# Patient Record
Sex: FEMALE | Race: WHITE | NOT HISPANIC OR LATINO | Employment: UNEMPLOYED | URBAN - METROPOLITAN AREA
[De-identification: names, ages, dates, MRNs, and addresses within clinical notes are randomized per-mention and may not be internally consistent; named-entity substitution may affect disease eponyms.]

---

## 2017-06-28 ENCOUNTER — OFFICE VISIT (OUTPATIENT)
Dept: FAMILY MEDICINE | Facility: CLINIC | Age: 37
End: 2017-06-28
Payer: COMMERCIAL

## 2017-06-28 VITALS
DIASTOLIC BLOOD PRESSURE: 68 MMHG | HEART RATE: 96 BPM | TEMPERATURE: 99.3 F | BODY MASS INDEX: 20.8 KG/M2 | SYSTOLIC BLOOD PRESSURE: 108 MMHG | WEIGHT: 125 LBS

## 2017-06-28 DIAGNOSIS — N61.0 MASTITIS: Primary | ICD-10-CM

## 2017-06-28 PROCEDURE — 99213 OFFICE O/P EST LOW 20 MIN: CPT | Performed by: NURSE PRACTITIONER

## 2017-06-28 RX ORDER — CLINDAMYCIN HCL 300 MG
300 CAPSULE ORAL 3 TIMES DAILY
Qty: 21 CAPSULE | Refills: 0 | Status: SHIPPED | OUTPATIENT
Start: 2017-06-28 | End: 2017-07-05

## 2017-06-28 NOTE — MR AVS SNAPSHOT
After Visit Summary   6/28/2017    Germania Hayden    MRN: [id number]           Patient Information     Date Of Birth          1980        Visit Information        Provider Department      6/28/2017 10:20 AM Annabel Gabriel APRN CNP [facility]        Care Instructions          Thank you for choosing [facility].  You may be receiving a survey in the mail from Terrence Delgado regarding your visit today.  Please take a few minutes to complete and return the survey to let us know how we are doing.      If you have questions or concerns, please contact us via Smeet or you can contact your care team at [phone].    Our Clinic hours are:  Monday 6:40 am  to 7:00 pm  Tuesday -Friday 6:40 am to 5:00 pm    The [place] outpatient lab hours are:  Monday - Friday 6:10 am to 4:45 pm  Saturdays 7:00 am to 11:00 am  Appointments are required, call [phone]    If you have clinical questions after hours or would like to schedule an appointment,  call the clinic at [phone].          Follow-ups after your visit        Who to contact     If you have questions or need follow up information about today's clinic visit or your schedule please contact [facility] directly at [phone].  Normal or non-critical lab and imaging results will be communicated to you by [facility] [employer], letter or phone within 4 business days after the clinic has received the results. If you do not hear from us within 7 days, please contact the clinic through [employer] or phone. If you have a critical or abnormal lab result, we will notify you by phone as soon as possible.  Submit refill requests through Smeet or call your pharmacy and they will forward the refill request to us. Please allow 3 business days for your refill to be completed.          Additional Information About Your Visit        [facility] [employer]shar[facility]vine Information     Smeet gives you secure access to your electronic health record. If you see a primary  care provider, you can also send messages to your care team and make appointments. If you have questions, please call your primary care clinic.  If you do not have a primary care provider, please call [phone] and they will assist you.        Care EveryWhere ID     This is your Care EveryWhere ID. This could be used by other organizations to access your [facility] medical records  [id number]        Your Vitals Were     Pulse Temperature BMI (Body Mass Index)             96 99.3  F (37.4  C) (Tympanic) 20.8 kg/m2          Blood Pressure from Last 3 Encounters:   06/28/17 108/68   12/20/16 99/67   11/11/16 100/63    Weight from Last 3 Encounters:   06/28/17 125 lb (56.7 kg)   12/20/16 133 lb 6.4 oz (60.5 kg)   11/10/16 149 lb (67.6 kg)              Today, you had the following     No orders found for display       Primary Care Provider Office Phone # Fax #    Chioma Nesbitt [phone]       [facility] [address]  [facility] [address]        Equal Access to Services     FINN YEH : Hadii aad ku hadasho Soomaali, waaxda luqadaha, qaybta kaalmada adeegyada, rosi reynolds . So [facility] [phone].    ATENCIÓN: Si habla español, tiene a feliciano disposición servicios gratuitos de asistencia lingüística. Llame al [phone].    We comply with applicable federal civil rights laws and [place] laws. We do not discriminate on the basis of race, color, national origin, age, disability sex, sexual orientation or gender identity.            Thank you!     Thank you for choosing [facility]  for your care. Our goal is always to provide you with excellent care. Hearing back from our patients is one way we can continue to improve our services. Please take a few minutes to complete the written survey that you may receive in the mail after your visit with us. Thank you!             Your Updated Medication List - Protect others around you: Learn how to  safely use, store and throw away your medicines at www.disposemymeds.org.          This list is accurate as of: 6/28/17 10:34 AM.  Always use your most recent med list.                   Brand Name Dispense Instructions for use Diagnosis    acyclovir 200 MG capsule    ZOVIRAX    240 capsule    Take 1 capsule (200 mg) by mouth 2 times daily    History of cold sores       COLACE PO      Take 50 mg by mouth 2 times daily as needed        norethindrone 0.35 MG per tablet    MICRONOR    84 tablet    Take 1 tablet (0.35 mg) by mouth daily    Routine postpartum follow-up       PEPCID PO      Take 20 mg by mouth daily        prenatal multivitamin  plus iron 27-0.8 MG Tabs per tablet      Take 1 tablet by mouth daily        sennosides 8.6 MG tablet    SENOKOT     Take 1 tablet by mouth daily

## 2017-06-28 NOTE — PATIENT INSTRUCTIONS
Thank you for choosing [facility]  You may be receiving a survey in the mail from Terrence Delgado regarding your visit today.  Please take a few minutes to complete and return the survey to let us know how we are doing.      If you have questions or concerns, please contact us via Brightstar or you can contact your care team at [phone].    Our Clinic hours are:  Monday 6:40 am  to 7:00 pm  Tuesday -Friday 6:40 am to 5:00 pm    The [place] outpatient lab hours are:  Monday - Friday 6:10 am to 4:45 pm  Saturdays 7:00 am to 11:00 am  Appointments are required, call [phone]    If you have clinical questions after hours or would like to schedule an appointment,  call the clinic at [phone].

## 2017-06-28 NOTE — PROGRESS NOTES
SUBJECTIVE:                                                    Germania Hayden is a 37 year old female who presents to clinic today for the following health issues:      C/O right breast pain for one day. Currently nursing/lactating. ? Mastitis. Breast is painful, warm to touch and slightly erythemic. Symptoms started yesterday and not improving. She has a previous history of plugged ducts however after manual manipulation symptoms have not improved. No fevers, she feel a little fatigued.     -------------------------------------    Problem list and histories reviewed & adjusted, as indicated.  Additional history: as documented    Patient Active Problem List   Diagnosis     Vulvar varicose veins     Past Surgical History:   Procedure Laterality Date     ARTHROSCOPIC RECONSTRUCTION ANTERIOR CRUCIATE LIGAMENT  x3    twice on L knee and once on Rt knee     wisdom teeth         Social History   Substance Use Topics     Smoking status: Never Smoker     Smokeless tobacco: Never Used     Alcohol use 0.0 oz/week     0 Standard drinks or equivalent per week      Comment: Occassional- quit with pregnancy     Family History   Problem Relation Age of Onset     CANCER Paternal Grandmother 60     ovarian     Breast Cancer Paternal Grandmother      stage 4     CANCER Maternal Grandmother      lymphoma     CANCER Maternal Grandfather      lung ,bone     Cardiovascular Paternal Grandfather      MI     GASTROINTESTINAL DISEASE Brother      ulcerative colitis           Reviewed and updated as needed this visit by clinical staff       Reviewed and updated as needed this visit by Provider         ROS:  Constitutional, HEENT, cardiovascular, pulmonary, GI, , musculoskeletal, neuro, skin, endocrine and psych systems are negative, except as otherwise noted.    OBJECTIVE:     /68 (BP Location: Left arm, Patient Position: Chair, Cuff Size: Adult Regular)  Pulse 96  Temp 99.3  F (37.4  C) (Tympanic)  Wt 125 lb (56.7 kg)  BMI  20.8 kg/m2  Body mass index is 20.8 kg/(m^2).  GENERAL: healthy, alert and no distress  RESP: Unlabored  BREAST: Right breast with faint erythema right, tenderness right  and warmth right  MS: no gross musculoskeletal defects noted, no edema    Diagnostic Test Results:  none     ASSESSMENT/PLAN:         1. Mastitis  Patient development of breast tenderness, erythema and warmth- will treat for mastitis vs plugged ducts.   - patient can follow up with lactation consultant prn   - clindamycin (CLEOCIN) 300 MG capsule; Take 1 capsule (300 mg) by mouth 3 times daily for 7 days  Dispense: 21 capsule; Refill: 0        WILLIAMS Manley [facility]

## 2017-06-28 NOTE — NURSING NOTE
"Initial /68 (BP Location: Left arm, Patient Position: Chair, Cuff Size: Adult Regular)  Pulse 96  Temp 99.3  F (37.4  C) (Tympanic)  Wt 125 lb (56.7 kg)  BMI 20.8 kg/m2 Estimated body mass index is 20.8 kg/(m^2) as calculated from the following:    Height as of 12/20/16: 5' 5\" (1.651 m).    Weight as of this encounter: 125 lb (56.7 kg). .    Kirstie Logan    "

## 2017-10-05 ENCOUNTER — HOSPITAL ENCOUNTER (EMERGENCY)
Facility: CLINIC | Age: 37
Discharge: HOME | End: 2017-10-05
Attending: EMERGENCY MEDICINE | Admitting: EMERGENCY MEDICINE
Payer: COMMERCIAL

## 2017-10-05 VITALS
SYSTOLIC BLOOD PRESSURE: 116 MMHG | DIASTOLIC BLOOD PRESSURE: 74 MMHG | OXYGEN SATURATION: 98 % | HEIGHT: 64 IN | TEMPERATURE: 97.9 F | HEART RATE: 98 BPM

## 2017-10-05 DIAGNOSIS — R07.89 ATYPICAL CHEST PAIN: ICD-10-CM

## 2017-10-05 PROCEDURE — 93010 ELECTROCARDIOGRAM REPORT: CPT | Mod: Z6 | Performed by: EMERGENCY MEDICINE

## 2017-10-05 PROCEDURE — 93005 ELECTROCARDIOGRAM TRACING: CPT

## 2017-10-05 PROCEDURE — 99283 EMERGENCY DEPT VISIT LOW MDM: CPT

## 2017-10-05 PROCEDURE — 99283 EMERGENCY DEPT VISIT LOW MDM: CPT | Mod: 25 | Performed by: EMERGENCY MEDICINE

## 2017-10-05 ASSESSMENT — ENCOUNTER SYMPTOMS
SHORTNESS OF BREATH: 0
FEVER: 0
ABDOMINAL PAIN: 0

## 2017-10-05 NOTE — ED PROVIDER NOTES
"  History     Chief Complaint   Patient presents with     Chest Pain     over the past week she has been having chest pain on and off. Has been seen for it has has a lump in her breast and has an appointment for a mamogram     HPI  Germania Hayden is a 37 year old female who has past medical history significant for depression, anxiety, eating disorder, and history of prior breast implants, presenting to the emergency Department with complaints of left-sided chest pain, and addition to lump sensation in the left breast.  Patient was seen by her plastic surgeon earlier today, and there may have been perhaps a slight lump which was noticed, and mammogram has been ordered.  Patient has not yet scheduled that mammogram appointment.  She complains of intermittent left-sided chest pain.  No associated shortness of breath.  No aggravating or alleviating factors.  Patient has an 11 month old child, who she stopped breast-feeding approximately 2 months ago.  There has been no redness of the breast or chest wall.  No cough.  No fever.  No family history of early cardiac disease.  There is family history of breast cancer in her grandmother at elderly ages.    I have reviewed the Medications, Allergies, Past Medical and Surgical History, and Social History in the Epic system.         Review of Systems   Constitutional: Negative for fever.   Respiratory: Negative for shortness of breath.    Cardiovascular: Positive for chest pain.   Gastrointestinal: Negative for abdominal pain.   All other systems reviewed and are negative.      Physical Exam   BP: 116/74  Pulse: 98  Temp: 97.9  F (36.6  C)  Height: 162.6 cm (5' 4\")  SpO2: 98 %  Physical Exam  /74  Pulse 98  Temp 97.9  F (36.6  C) (Oral)  Ht 1.626 m (5' 4\")  SpO2 98%  /74  Pulse 98  Temp 97.9  F (36.6  C) (Oral)  Ht 1.626 m (5' 4\")  SpO2 98%  General: alert and in no acute distress  Head: atraumatic, normocephalic  Abd: Soft, nontender, nondistended, no " peritoneal signs  CV: With breast exam, chaperoned by nurse.  Perhaps slight, approximately 1 cm nontender masslike sensation of the 2 o'clock position of the left breast.  No redness.  No tenderness to palpation.  S1-S2 normal, regular rate and rhythm.  Musculoskel/Extremities: normal extremities, no edema, erythema, tenderness and full AROM of major joints without tenderness  Skin: no rashes, no diaphoresis and skin color normal  Neuro: Patient awake, alert, oriented, speech is fluent, gait is normal  Psychiatric: affect/mood normal, cooperative, normal judgement/insight and memory intact          ED Course     ED Course     Procedures                EKG Interpretation:      Interpreted by Devante Donahue  Time reviewed:1715   Symptoms at time of EKG: chest pain   Rhythm: normal sinus   Rate: normal  Axis: NORMAL  Ectopy: none  Conduction: normal  ST Segments/ T Waves: No ST-T wave changes  Q Waves: none  Comparison to prior: No old EKG available    Clinical Impression: normal EKG    Critical Care time:  none               Labs Ordered and Resulted from Time of ED Arrival Up to the Time of Departure from the ED - No data to display    Assessments & Plan (with Medical Decision Making)  37 year old female , with past medical history as stated above, presenting to the emergency Department with complaints of left-sided chest pain, intermittent in nature over the past approximately one week.  Patient does have prior breast implants, and was seen by her plastic surgeon during the day today.  No abnormality noted no concerns according to the patient from her plastic surgeon.  Outpatient mammography has been ordered because of concern for possible breast lump, and the left upper quadrant of the left breast.  Is is nontender.  No signs of mastitis, cellulitis, or other external abnormality.  No reproducible tenderness on exam.  Lungs are clear.  Cardiovascular exam normal.  EKG normal.  Wells criteria negative and no  further workup indicated for PE.  Patient is extremely anxious in the emergency department, likely contributing to her symptoms.  She does have outpatient mammography plans for the lump.  Otherwise encouraged to follow up with primary care provider.       I have reviewed the nursing notes.    I have reviewed the findings, diagnosis, plan and need for follow up with the patient.       New Prescriptions    No medications on file       Final diagnoses:   Atypical chest pain       10/5/2017   [facility] EMERGENCY DEPARTMENT     Devante Donahue MD  10/05/17 7668

## 2017-10-05 NOTE — ED AVS SNAPSHOT
[facility] Emergency Department    [address] [address]    Phone:  [phone]    Fax:  [phone]                                       Germania Hayden   MRN: [id number]    Department:  [facility] Emergency Department   Date of Visit:  10/5/2017           After Visit Summary Signature Page     I have received my discharge instructions, and my questions have been answered. I have discussed any challenges I see with this plan with the nurse or doctor.    ..........................................................................................................................................  Patient/Patient Representative Signature      ..........................................................................................................................................  Patient Representative Print Name and Relationship to Patient    ..................................................               ................................................  Date                                            Time    ..........................................................................................................................................  Reviewed by Signature/Title    ...................................................              ..............................................  Date                                                            Time

## 2017-10-05 NOTE — ED AVS SNAPSHOT
[facility] Emergency Department    [address] [address]    Phone:  [phone]    Fax:  [phone]                                       Germania Hayden   MRN: [id number]    Department:  [facility] Emergency Department   Date of Visit:  10/5/2017           Patient Information     Date Of Birth          1980        Your diagnoses for this visit were:     Atypical chest pain        You were seen by Devante Donahue MD.        Discharge Instructions       ekg normal.          *CHEST PAIN, UNCERTAIN CAUSE    Based on your exam today, the exact cause of your chest pain is not certain. Your condition does not seem serious at this time, and your pain does not appear to be coming from your heart. However, sometimes the signs of a serious problem take more time to appear. Therefore, watch for the warning signs listed below.  HOME CARE:  1. Rest today and avoid strenuous activity.  2. Take any prescribed medicine as directed.  FOLLOW UP with your doctor in 1-3 days.   GET PROMPT MEDICAL ATTENTION if any of the following occur:    A change in the type of pain: if it feels different, becomes more severe, lasts longer, or begins to spread into your shoulder, arm, neck, jaw or back    Shortness of breath or increased pain with breathing    Weakness, dizziness, or fainting    Cough with blood or dark colored sputum (phlegm)    Fever over 101  F (38.3  C)    Swelling, pain or redness in one leg    [address] [place] [address]. All rights reserved. This information is not intended as a substitute for professional medical care. Always follow your healthcare professional's instructions.      Future Appointments        Provider Department Dept Phone Center    10/27/2017 11:00 AM Radha Pool MD [facility] [phone] [facility]      24 Hour Appointment Hotline       To make an appointment at any [facility], call [phone]  ([phone]). If you don't have a family doctor or clinic, we will help you find one. [facility] clinics are conveniently located to serve the needs of you and your family.             Review of your medicines      Our records show that you are taking the medicines listed below. If these are incorrect, please call your family doctor or clinic.        Dose / Directions Last dose taken    acyclovir 200 MG capsule   Commonly known as:  ZOVIRAX   Dose:  200 mg   Quantity:  240 capsule        Take 1 capsule (200 mg) by mouth 2 times daily   Refills:  11        COLACE PO   Dose:  50 mg        Take 50 mg by mouth 2 times daily as needed   Refills:  0        norethindrone 0.35 MG per tablet   Commonly known as:  MICRONOR   Dose:  1 tablet   Quantity:  84 tablet        Take 1 tablet (0.35 mg) by mouth daily   Refills:  3        PEPCID PO   Dose:  20 mg        Take 20 mg by mouth daily   Refills:  0        prenatal multivitamin plus iron 27-0.8 MG Tabs per tablet   Dose:  1 tablet        Take 1 tablet by mouth daily   Refills:  0        sennosides 8.6 MG tablet   Commonly known as:  SENOKOT   Dose:  1 tablet        Take 1 tablet by mouth daily   Refills:  0                Procedures and tests performed during your visit     EKG 12 lead      Orders Needing Specimen Collection     None      Pending Results     No orders found from 10/3/2017 to 10/6/2017.            Pending Culture Results     No orders found from 10/3/2017 to 10/6/2017.            Pending Results Instructions     If you had any lab results that were not finalized at the time of your Discharge, you can call the ED Lab Result RN at [phone]. You will be contacted by this team for any positive Lab results or changes in treatment. The nurses are available 7 days a week from 10A to 6:30P.  You can leave a message 24 hours per day and they will return your call.        Test Results From Your Hospital Stay               Thank you for choosing [facility]        Thank you for choosing [facility] for your care. Our goal is always to provide you with excellent care. Hearing back from our patients is one way we can continue to improve our services. Please take a few minutes to complete the written survey that you may receive in the mail after you visit with us. Thank you!        [employer] D[employer] Information     [employer] gives you secure access to your electronic health record. If you see a primary care provider, you can also send messages to your care team and make appointments. If you have questions, please call your primary care clinic.  If you do not have a primary care provider, please call [phone] and they will assist you.        Care EveryWhere ID     This is your Care EveryWhere ID. This could be used by other organizations to access your [facility] medical records  [id number]        Equal Access to Services     FINN YEH : Sara Sullivan, amy lee, deo martin, rosi ortega. So [facility] [phone].    ATENCIÓN: Si habla español, tiene a feliciano disposición servicios gratuitos de asistencia lingüística. Llame al [phone].    We comply with applicable federal civil rights laws and [place] laws. We do not discriminate on the basis of race, color, national origin, age, disability, sex, sexual orientation, or gender identity.            After Visit Summary       This is your record. Keep this with you and show to your community pharmacist(s) and doctor(s) at your next visit.

## 2017-10-05 NOTE — DISCHARGE INSTRUCTIONS
ekg normal.          *CHEST PAIN, UNCERTAIN CAUSE    Based on your exam today, the exact cause of your chest pain is not certain. Your condition does not seem serious at this time, and your pain does not appear to be coming from your heart. However, sometimes the signs of a serious problem take more time to appear. Therefore, watch for the warning signs listed below.  HOME CARE:  1. Rest today and avoid strenuous activity.  2. Take any prescribed medicine as directed.  FOLLOW UP with your doctor in 1-3 days.   GET PROMPT MEDICAL ATTENTION if any of the following occur:    A change in the type of pain: if it feels different, becomes more severe, lasts longer, or begins to spread into your shoulder, arm, neck, jaw or back    Shortness of breath or increased pain with breathing    Weakness, dizziness, or fainting    Cough with blood or dark colored sputum (phlegm)    Fever over 101  F (38.3  C)    Swelling, pain or redness in one leg    [address]25[address]14[address] [place] [address]. All rights reserved. This information is not intended as a substitute for professional medical care. Always follow your healthcare professional's instructions.

## 2017-10-11 ENCOUNTER — COMMUNICATION - HEALTHEAST (OUTPATIENT)
Dept: TELEHEALTH | Facility: CLINIC | Age: 37
End: 2017-10-11

## 2017-10-11 ENCOUNTER — HOSPITAL ENCOUNTER (OUTPATIENT)
Dept: MAMMOGRAPHY | Facility: CLINIC | Age: 37
Discharge: HOME | End: 2017-10-11
Attending: PLASTIC SURGERY

## 2017-10-11 ENCOUNTER — HOSPITAL ENCOUNTER (OUTPATIENT)
Dept: ULTRASOUND IMAGING | Facility: CLINIC | Age: 37
Discharge: HOME | End: 2017-10-11
Attending: PLASTIC SURGERY

## 2017-10-11 ENCOUNTER — TRANSFERRED RECORDS (OUTPATIENT)
Dept: HEALTH INFORMATION MANAGEMENT | Facility: CLINIC | Age: 37
End: 2017-10-11

## 2017-10-11 DIAGNOSIS — N63.20 LEFT BREAST MASS: ICD-10-CM

## 2017-10-17 ENCOUNTER — OFFICE VISIT (OUTPATIENT)
Dept: FAMILY MEDICINE | Facility: CLINIC | Age: 37
End: 2017-10-17
Payer: COMMERCIAL

## 2017-10-17 DIAGNOSIS — Z23 NEED FOR PROPHYLACTIC VACCINATION AND INOCULATION AGAINST INFLUENZA: Primary | ICD-10-CM

## 2017-10-17 PROCEDURE — 90471 IMMUNIZATION ADMIN: CPT

## 2017-10-17 PROCEDURE — 99207 ZZC NO CHARGE NURSE ONLY: CPT

## 2017-10-17 PROCEDURE — 90686 IIV4 VACC NO PRSV 0.5 ML IM: CPT

## 2017-10-17 NOTE — MR AVS SNAPSHOT
After Visit Summary   10/17/2017    Germania Hayden    MRN: [id number]           Patient Information     Date Of Birth          1980        Visit Information        Provider Department      10/17/2017 11:15 AM Patel/Annette Bruton [facility]        Today's Diagnoses     Need for prophylactic vaccination and inoculation against influenza    -  1       Follow-ups after your visit        Your next 10 appointments already scheduled     Oct 27, 2017 11:00 AM CDT   [facility] Visit with Radha Pool MD   [facility]    [address] [address]   [phone]           Bring a current list of meds and any records pertaining to this visit. For Physicals, please bring immunization records and any forms needing to be filled out. Please arrive 10 minutes early to complete paperwork.              Who to contact     Normal or non-critical lab and imaging results will be communicated to you by JFroghart, letter or phone within 4 business days after the clinic has received the results. If you do not hear from us within 7 days, please contact the clinic through JFroghart or phone. If you have a critical or abnormal lab result, we will notify you by phone as soon as possible.  Submit refill requests through [employer] or call your pharmacy and they will forward the refill request to us. Please allow 3 business days for your refill to be completed.          If you need to speak with a  for additional information , please call: [phone]           Additional Information About Your Visit        [employer] Information     [employer] gives you secure access to your electronic health record. If you see a primary care provider, you can also send messages to your care team and make appointments. If you have questions, please call your primary care clinic.  If you do not have a primary care provider, please call [phone] and they will  assist you.        Care EveryWhere ID     This is your Care EveryWhere ID. This could be used by other organizations to access your [place] medical records  [id number]         Blood Pressure from Last 3 Encounters:   10/05/17 116/74   06/28/17 108/68   12/20/16 99/67    Weight from Last 3 Encounters:   06/28/17 125 lb (56.7 kg)   12/20/16 133 lb 6.4 oz (60.5 kg)   11/10/16 149 lb (67.6 kg)              We Performed the Following     FLU VAC, SPLIT VIRUS IM > 3 YO (QUADRIVALENT) [18135]     Vaccine Administration, Initial [45635]        Primary Care Provider Office Phone # Fax #    Chioma Nesbitt [phone] [address] [facility] [address]        Equal Access to Services     FINN YEH : Hadii april sheth hadasho Soanna, waaxda luqadaha, qaybta kaalmada adeegyada, rosi reynolds . So [facility] [phone].    ATENCIÓN: Si habla español, tiene a feliciano disposición servicios gratuitos de asistencia lingüística. [facility] [phone].    We comply with applicable federal civil rights laws and [place] laws. We do not discriminate on the basis of race, color, national origin, age, disability, sex, sexual orientation, or gender identity.            Thank you!     Thank you for choosing [facility]  for your care. Our goal is always to provide you with excellent care. Hearing back from our patients is one way we can continue to improve our services. Please take a few minutes to complete the written survey that you may receive in the mail after your visit with us. Thank you!             Your Updated Medication List - Protect others around you: Learn how to safely use, store and throw away your medicines at www.disposemymeds.org.          This list is accurate as of: 10/17/17 12:15 PM.  Always use your most recent med list.                   Brand Name Dispense Instructions for use Diagnosis    acyclovir 200 MG capsule    ZOVIRAX    240 capsule    Take 1  capsule (200 mg) by mouth 2 times daily    History of cold sores       COLACE PO      Take 50 mg by mouth 2 times daily as needed        norethindrone 0.35 MG per tablet    MICRONOR    84 tablet    Take 1 tablet (0.35 mg) by mouth daily    Routine postpartum follow-up       PEPCID PO      Take 20 mg by mouth daily        prenatal multivitamin plus iron 27-0.8 MG Tabs per tablet      Take 1 tablet by mouth daily        sennosides 8.6 MG tablet    SENOKOT     Take 1 tablet by mouth daily

## 2017-10-17 NOTE — PROGRESS NOTES
Injectable Influenza Immunization Documentation    1.  Is the person to be vaccinated sick today?   No    2. Does the person to be vaccinated have an allergy to a component   of the vaccine?   No    3. Has the person to be vaccinated ever had a serious reaction   to influenza vaccine in the past?   No    4. Has the person to be vaccinated ever had Guillain-Barré syndrome?   No    Form completed by Tita Mata [facility]

## 2017-10-20 ENCOUNTER — TELEPHONE (OUTPATIENT)
Dept: FAMILY MEDICINE | Facility: CLINIC | Age: 37
End: 2017-10-20

## 2017-10-20 NOTE — TELEPHONE ENCOUNTER
"Reason for call:  Patient reporting a symptom    Symptom or request: pinworms??    Duration (how long have symptoms been present): 2-4 weeks    Have you been treated for this before? Yes    Additional comments: pt calling stating \"we're a really clean family but I think we have pinworms\". She states they visited her brother and after they left he started having symptoms. Rectal itching and seem \"some things\" in his stool. She states that they are having symptoms as well. She is wondering if they need to be seen or if there is something they can do at home. She has 3 children.    Phone Number patient can be reached at:  Home number on file [phone] (home)    Best Time:  any    Can we leave a detailed message on this number:  YES    Call taken on 10/20/2017 at 3:56 PM by Savanna Drake    "

## 2017-10-20 NOTE — TELEPHONE ENCOUNTER
Patient states a few weeks ago she visited her brother.  She recently found out he was diagnosed with pinworms.  Patient states she has a 1, 3 and 5 year old that are non symptomatic.  She states she is symptomatic with anal itching.  Denies any other symptoms.  RN advised appt - she was transferred to scheduling to schedule family.    Antonietta FITZPATRICK RN

## 2017-10-23 ENCOUNTER — TELEPHONE (OUTPATIENT)
Dept: FAMILY MEDICINE | Facility: CLINIC | Age: 37
End: 2017-10-23

## 2017-10-23 ENCOUNTER — OFFICE VISIT (OUTPATIENT)
Dept: FAMILY MEDICINE | Facility: CLINIC | Age: 37
End: 2017-10-23
Payer: COMMERCIAL

## 2017-10-23 VITALS
BODY MASS INDEX: 21 KG/M2 | TEMPERATURE: 98.1 F | WEIGHT: 123 LBS | HEIGHT: 64 IN | HEART RATE: 64 BPM | SYSTOLIC BLOOD PRESSURE: 101 MMHG | DIASTOLIC BLOOD PRESSURE: 70 MMHG

## 2017-10-23 DIAGNOSIS — L29.0 ANAL ITCHING: Primary | ICD-10-CM

## 2017-10-23 PROCEDURE — 99213 OFFICE O/P EST LOW 20 MIN: CPT | Performed by: FAMILY MEDICINE

## 2017-10-23 NOTE — MR AVS SNAPSHOT
After Visit Summary   10/23/2017    Germania Hayden    MRN: [id number]           Patient Information     Date Of Birth          1980        Visit Information        Provider Department      10/23/2017 7:20 AM Bailey Whitley MD [facility]        Today's Diagnoses     Anal itching    -  1      Care Instructions          Thank you for choosing [facility].  You may be receiving a survey in the mail from [facility] regarding your visit today.  Please take a few minutes to complete and return the survey to let us know how we are doing.      If you have questions or concerns, please contact us via [employer] or you can contact your care team at [phone].    Our Clinic hours are:  Monday 6:40 am  to 7:00 pm  Tuesday -Friday 6:40 am to 5:00 pm    The [place] outpatient lab hours are:  Monday - Friday 6:10 am to 4:45 pm  Saturdays 7:00 am to 11:00 am  Appointments are required, call [phone]    If you have clinical questions after hours or would like to schedule an appointment,  call the clinic at [phone].          Follow-ups after your visit        Your next 10 appointments already scheduled     Oct 27, 2017 11:00 AM CDT   [facility] Visit with Radha Pool MD   [facility]    [address] [address] [phone]           Bring a current list of meds and any records pertaining to this visit. For Physicals, please bring immunization records and any forms needing to be filled out. Please arrive 10 minutes early to complete paperwork.              Future tests that were ordered for you today     Open Future Orders        Priority Expected Expires Ordered    Pinworm exam Routine  11/22/2017 10/23/2017            Who to contact     If you have questions or need follow up information about today's clinic visit or your schedule please contact [facility] directly at [phone].  Normal  "or non-critical lab and imaging results will be communicated to you by MyChart, letter or phone within 4 business days after the clinic has received the results. If you do not hear from us within 7 days, please contact the clinic through LocoM[employer] or phone. If you have a critical or abnormal lab result, we will notify you by phone as soon as possible.  Submit refill requests through [email or web address] or call your pharmacy and they will forward the refill request to us. Please allow 3 business days for your refill to be completed.          Additional Information About Your Visit        [employer] Information     [email or web address] gives you secure access to your electronic health record. If you see a primary care provider, you can also send messages to your care team and make appointments. If you have questions, please call your primary care clinic.  If you do not have a primary care provider, please call [phone] and they will assist you.        Care EveryWhere ID     This is your Care EveryWhere ID. This could be used by other organizations to access your [place] medical records  [id number]        Your Vitals Were     Pulse Temperature Height BMI (Body Mass Index)          64 98.1  F (36.7  C) (Tympanic) 5' 4.25\" (1.632 m) 20.95 kg/m2         Blood Pressure from Last 3 Encounters:   10/23/17 101/70   10/05/17 116/74   06/28/17 108/68    Weight from Last 3 Encounters:   10/23/17 123 lb (55.8 kg)   06/28/17 125 lb (56.7 kg)   12/20/16 133 lb 6.4 oz (60.5 kg)               Primary Care Provider Office Phone # Fax #    Chioma Nesbitt [phone] [address] [address]        Equal Access to Services     FINN YEH AH: Hadfaustino Sullivan, amy lee, rosi gastelum. So [facility] [phone].    ATENCIÓN: Si habla español, tiene a feliciano disposición servicios gratuitos de asistencia lingüística. Llame al [phone].    We comply with " applicable federal civil rights laws and [place] laws. We do not discriminate on the basis of race, color, national origin, age, disability, sex, sexual orientation, or gender identity.            Thank you!     Thank you for choosing [facility]  for your care. Our goal is always to provide you with excellent care. Hearing back from our patients is one way we can continue to improve our services. Please take a few minutes to complete the written survey that you may receive in the mail after your visit with us. Thank you!             Your Updated Medication List - Protect others around you: Learn how to safely use, store and throw away your medicines at www.disposemymeds.org.          This list is accurate as of: 10/23/17  8:49 AM.  Always use your most recent med list.                   Brand Name Dispense Instructions for use Diagnosis    acyclovir 200 MG capsule    ZOVIRAX    240 capsule    Take 1 capsule (200 mg) by mouth 2 times daily    History of cold sores       COLACE PO      Take 50 mg by mouth 2 times daily as needed        norethindrone 0.35 MG per tablet    MICRONOR    84 tablet    Take 1 tablet (0.35 mg) by mouth daily    Routine postpartum follow-up       PEPCID PO      Take 20 mg by mouth daily        prenatal multivitamin plus iron 27-0.8 MG Tabs per tablet      Take 1 tablet by mouth daily        sennosides 8.6 MG tablet    SENOKOT     Take 1 tablet by mouth daily

## 2017-10-23 NOTE — TELEPHONE ENCOUNTER
"I called Germania back,   She is quite frantic about the possibility that she and her kids have pinworms.   She had many questions,   I was able to review info in labs and assure her the results will be back in same day or next day , once spec in lab.   She also thinks she may have seen a worm in younger son's diaper, encouraged her to have him seen. \"well, maybe I will do the test first, Dr Whitley said if one is positive, she will treat all. \"    Delia Ly RNC        "

## 2017-10-23 NOTE — TELEPHONE ENCOUNTER
Reason for Call:  Other call back    Detailed comments: Patient has questions on specimen collection.    Phone Number Patient can be reached at: Home number on file [phone] (home)    Best Time: any    Can we leave a detailed message on this number? YES    Call taken on 10/23/2017 at 9:58 AM by Rubia Garcia

## 2017-10-23 NOTE — PATIENT INSTRUCTIONS
Thank you for choosing [facility].  You may be receiving a survey in the mail from Terrence Delgado regarding your visit today.  Please take a few minutes to complete and return the survey to let us know how we are doing.      If you have questions or concerns, please contact us via IQuum or you can contact your care team at [phone].    Our Clinic hours are:  Monday 6:40 am  to 7:00 pm  Tuesday -Friday 6:40 am to 5:00 pm    The [place] outpatient lab hours are:  Monday - Friday 6:10 am to 4:45 pm  Saturdays 7:00 am to 11:00 am  Appointments are required, call [phone]    If you have clinical questions after hours or would like to schedule an appointment,  call the clinic at [phone].

## 2017-10-23 NOTE — PROGRESS NOTES
SUBJECTIVE:   Germania Hayden is a 37 year old female who presents to clinic today for the following health issues:      Concern - Patient ? Pin worms   Onset: 1 mo ago     Description: Patient started about with rectal itchy about 1 mo ago but does has a hx of hemorrhoids she was visiting her brother and 1 week later he went in and had pin worms but does not have any children she also feels like something there     Patient may have pin worms     Intensity: moderate    Progression of Symptoms:  same    Accompanying Signs & Symptoms:  None     Previous history of similar problem:   Patient has had hemmorroids in the past     Precipitating factors:   Worsened by: worse at night feels like something is moving in rectal area     Alleviating factors:  Improved by: none     Therapies Tried and outcome: Patient tried the pinx over the counter       37 yr old female with one month of anal itching, says the itching appears worse at night. No abdominal pain . No blood in stool. She does have hemorrhoids and for a long time she thought that it was her hemorrhoids acting up. She visited her brother who was diagnosed with pin worm and she will like to be tested for this. She has tried over the counter medication for this and it did not seem to be effective    Problem list and histories reviewed & adjusted, as indicated.  Additional history: as documented    Patient Active Problem List   Diagnosis     Vulvar varicose veins     Past Surgical History:   Procedure Laterality Date     ARTHROSCOPIC RECONSTRUCTION ANTERIOR CRUCIATE LIGAMENT  x3    twice on L knee and once on Rt knee     wisdom teeth         Social History   Substance Use Topics     Smoking status: Never Smoker     Smokeless tobacco: Never Used     Alcohol use 0.0 oz/week     0 Standard drinks or equivalent per week      Comment: Occassional- quit with pregnancy     Family History   Problem Relation Age of Onset     CANCER Paternal Grandmother 60     ovarian      "Breast Cancer Paternal Grandmother      stage 4     CANCER Maternal Grandmother      lymphoma     CANCER Maternal Grandfather      lung ,bone     Cardiovascular Paternal Grandfather      MI     GASTROINTESTINAL DISEASE Brother      ulcerative colitis         Current Outpatient Prescriptions   Medication Sig Dispense Refill     Prenatal Vit-Fe Fumarate-FA (PRENATAL MULTIVITAMIN  PLUS IRON) 27-0.8 MG TABS Take 1 tablet by mouth daily       norethindrone (MICRONOR) 0.35 MG per tablet Take 1 tablet (0.35 mg) by mouth daily 84 tablet 3     acyclovir (ZOVIRAX) 200 MG capsule Take 1 capsule (200 mg) by mouth 2 times daily 240 capsule 11     Docusate Sodium (COLACE PO) Take 50 mg by mouth 2 times daily as needed        Famotidine (PEPCID PO) Take 20 mg by mouth daily        sennosides (SENOKOT) 8.6 MG tablet Take 1 tablet by mouth daily       No Known Allergies  BP Readings from Last 3 Encounters:   10/23/17 101/70   10/05/17 116/74   06/28/17 108/68    Wt Readings from Last 3 Encounters:   10/23/17 123 lb (55.8 kg)   06/28/17 125 lb (56.7 kg)   12/20/16 133 lb 6.4 oz (60.5 kg)                  Labs reviewed in EPIC        Reviewed and updated as needed this visit by clinical staffTobacco  Allergies  Med Hx  Surg Hx  Fam Hx  Soc Hx      Reviewed and updated as needed this visit by Provider         ROS:  Constitutional, HEENT, cardiovascular, pulmonary, gi and gu systems are negative, except as otherwise noted.      OBJECTIVE:   /70 (BP Location: Left arm, Cuff Size: Adult Small)  Pulse 64  Temp 98.1  F (36.7  C) (Tympanic)  Ht 5' 4.25\" (1.632 m)  Wt 123 lb (55.8 kg)  BMI 20.95 kg/m2  Body mass index is 20.95 kg/(m^2).  GENERAL: healthy, alert and no distress  EYES: Eyes grossly normal to inspection, PERRL and conjunctivae and sclerae normal  HENT: ear canals and TM's normal, nose and mouth without ulcers or lesions  NECK: no adenopathy, no asymmetry, masses, or scars and thyroid normal to palpation  RESP: " lungs clear to auscultation - no rales, rhonchi or wheezes  CV: regular rate and rhythm, normal S1 S2, no S3 or S4, no murmur, click or rub, no peripheral edema and peripheral pulses strong  ABDOMEN: soft, nontender, no hepatosplenomegaly, no masses and bowel sounds normal  MS: no gross musculoskeletal defects noted, no edema    Diagnostic Test Results:  Pending     ASSESSMENT/PLAN:   (L29.0) Anal itching  (primary encounter diagnosis)  Comment: Pin worm testing in progress. Patient will be notified of results   Plan: Pinworm exam              FUTURE APPOINTMENTS:       - Follow-up visit as needed    Bailey Whitley MD  [facility]

## 2017-10-23 NOTE — NURSING NOTE
"Chief Complaint   Patient presents with     pin worms       Initial /70 (BP Location: Left arm, Cuff Size: Adult Small)  Pulse 64  Temp 98.1  F (36.7  C) (Tympanic)  Ht 5' 4.25\" (1.632 m)  Wt 123 lb (55.8 kg)  BMI 20.95 kg/m2 Estimated body mass index is 20.95 kg/(m^2) as calculated from the following:    Height as of this encounter: 5' 4.25\" (1.632 m).    Weight as of this encounter: 123 lb (55.8 kg).  Medication Reconciliation: complete  "

## 2017-10-24 DIAGNOSIS — L29.0 ANAL ITCHING: ICD-10-CM

## 2017-10-24 LAB
E VERMICULARIS SPEC QL PINWORM EXAM: NORMAL
SPECIMEN SOURCE: NORMAL

## 2017-10-24 PROCEDURE — 87172 PINWORM EXAM: CPT | Performed by: FAMILY MEDICINE

## 2017-10-27 ENCOUNTER — OFFICE VISIT (OUTPATIENT)
Dept: OBGYN | Facility: CLINIC | Age: 37
End: 2017-10-27
Payer: COMMERCIAL

## 2017-10-27 VITALS
SYSTOLIC BLOOD PRESSURE: 114 MMHG | BODY MASS INDEX: 20.43 KG/M2 | HEART RATE: 97 BPM | DIASTOLIC BLOOD PRESSURE: 79 MMHG | WEIGHT: 122.6 LBS | HEIGHT: 65 IN

## 2017-10-27 DIAGNOSIS — N63.0 LUMP OR MASS IN BREAST: ICD-10-CM

## 2017-10-27 DIAGNOSIS — Z30.430 ENCOUNTER FOR INSERTION OF INTRAUTERINE CONTRACEPTIVE DEVICE (IUD): Primary | ICD-10-CM

## 2017-10-27 DIAGNOSIS — N81.4 UTEROVAGINAL PROLAPSE: ICD-10-CM

## 2017-10-27 DIAGNOSIS — R19.5 FECES CONTENTS ABNORMAL: ICD-10-CM

## 2017-10-27 DIAGNOSIS — Z30.430 ENCOUNTER FOR INSERTION OF MIRENA IUD: ICD-10-CM

## 2017-10-27 LAB — HCG UR QL: NEGATIVE

## 2017-10-27 PROCEDURE — 99215 OFFICE O/P EST HI 40 MIN: CPT | Mod: 25 | Performed by: OBSTETRICS & GYNECOLOGY

## 2017-10-27 PROCEDURE — 81025 URINE PREGNANCY TEST: CPT | Performed by: OBSTETRICS & GYNECOLOGY

## 2017-10-27 PROCEDURE — 58300 INSERT INTRAUTERINE DEVICE: CPT | Performed by: OBSTETRICS & GYNECOLOGY

## 2017-10-27 NOTE — MR AVS SNAPSHOT
After Visit Summary   10/27/2017    Germania Hayden    MRN: [id number]           Patient Information     Date Of Birth          1980        Visit Information        Provider Department      10/27/2017 11:00 AM Radha Pool MD [facility]        Today's Diagnoses     Contraception    -  1    Feces contents abnormal           Follow-ups after your visit        Future tests that were ordered for you today     Open Future Orders        Priority Expected Expires Ordered    Ova and Parasite Exam Routine Routine  10/27/2018 10/27/2017            Who to contact     If you have questions or need follow up information about today's clinic visit or your schedule please contact [facility] directly at [phone].  Normal or non-critical lab and imaging results will be communicated to you by MyChart, letter or phone within 4 business days after the clinic has received the results. If you do not hear from us within 7 days, please contact the clinic through Welcome Real-timehart or phone. If you have a critical or abnormal lab result, we will notify you by phone as soon as possible.  Submit refill requests through PageBites or call your pharmacy and they will forward the refill request to us. Please allow 3 business days for your refill to be completed.          Additional Information About Your Visit        MyChart Information     PageBites gives you secure access to your electronic health record. If you see a primary care provider, you can also send messages to your care team and make appointments. If you have questions, please call your primary care clinic.  If you do not have a primary care provider, please call [phone] and they will assist you.        Care EveryWhere ID     This is your Care EveryWhere ID. This could be used by other organizations to access your [place] medical records  [id number]        Your Vitals Were     Pulse Height Last Period Breastfeeding? BMI (Body Mass  "Index)       97 5' 5\" (1.651 m) (LMP Unknown) No 20.4 kg/m2        Blood Pressure from Last 3 Encounters:   10/27/17 114/79   10/23/17 101/70   10/05/17 116/74    Weight from Last 3 Encounters:   10/27/17 122 lb 9.6 oz (55.6 kg)   10/23/17 123 lb (55.8 kg)   06/28/17 125 lb (56.7 kg)              We Performed the Following     HCG qualitative urine - CSC and Range          Today's Medication Changes          These changes are accurate as of: 10/27/17 11:49 AM.  If you have any questions, ask your nurse or doctor.               Stop taking these medicines if you haven't already. Please contact your care team if you have questions.     COLACE PO   Stopped by:  Radha Pool MD           PEPCID PO   Stopped by:  Radha Pool MD           prenatal multivitamin plus iron 27-0.8 MG Tabs per tablet   Stopped by:  Radha Pool MD           sennosides 8.6 MG tablet   Commonly known as:  SENOKOT   Stopped by:  Radha Pool MD                    Primary Care Provider Office Phone # Fax #    Chioma Anh Nesbitt [phone] [address] [address]        Equal Access to Services     FINN YEH AH: Hadii aad ku hadasho Soomaali, waaxda luqadaha, qaybta kaalmada adeegyada, waxay mavis ortega. So [facility] [phone].    ATENCIÓN: Si habla español, tiene a feliciano disposición servicios gratuitos de asistencia lingüística. Llame al [phone].    We comply with applicable federal civil rights laws and [place] laws. We do not discriminate on the basis of race, color, national origin, age, disability, sex, sexual orientation, or gender identity.            Thank you!     Thank you for choosing [facility]  for your care. Our goal is always to provide you with excellent care. Hearing back from our patients is one way we can continue to improve our services. Please take a few minutes to complete the written survey that you may receive in " the mail after your visit with us. Thank you!             Your Updated Medication List - Protect others around you: Learn how to safely use, store and throw away your medicines at www.disposemymeds.org.          This list is accurate as of: 10/27/17 11:49 AM.  Always use your most recent med list.                   Brand Name Dispense Instructions for use Diagnosis    acyclovir 200 MG capsule    ZOVIRAX    240 capsule    Take 1 capsule (200 mg) by mouth 2 times daily    History of cold sores       norethindrone 0.35 MG per tablet    MICRONOR    84 tablet    Take 1 tablet (0.35 mg) by mouth daily    Routine postpartum follow-up

## 2017-10-27 NOTE — PROGRESS NOTES
SUBJECTIVE:                                                   CC:  Patient presents with:  Consult: IUD possibly- currently on the pill      HPI:  Germania Hayden is a 37 year old  who presents for birth control discussion.  She is not sure exactly what she would like to do, one issue is that since the delivery of her son, tampons don't stay in as well.  Sometimes they just fall out.  She does feel that she has a degree of prolapse, no symptoms such as urinary retention or dyspareunia.  No urinary incontinence.  Periods are somewhat heavy, nothing overly concerning though.    Her brother was exposed to pinworms and she felt concerned that, because she and her children had come into contact with him, they could also be affected.  She had herself and her two daughters (age 3 and 6) tested and they were negative.  She didn't have her 2yo son tested, however the other day she wasn't sure if she saw something moving in his stools.  She would like a stool O&P for herself today, just so she can put the issue to rest, and she is also going to check with the pediatrician about checking him.    Also, she has a history of breat augmentation, just because she was very flat and wanted to be a normal size.  She was breastfeeding until about 3 months ago, and about 1 month or so ago she noticed a small lump on the left aspect of her left breast.  It is minimally tender.  She had a mammogram and ultrasound and they called it likely fibroadenoma but can't rule out malignancy.  She has no fam h/o breast cancer other than a paternal great grandmother who was diagnosed in her 80s or 90s.  The patient already saw her plastic surgeon and he didn't have much helpful information.  She was wondering if there is a breast onc surgeon outside the [facility] system she could use.  Is concerned that if she undergoes a biopsy, it could mess with the implant, and wonders if she even needs the biopsy.      ROS: 10 point ROS negative other  "than as listed above in HPI.    Gyn History:  No LMP recorded (lmp unknown).     Patient is sexually active.  Using oral contraceptives for contraception.   Recent pap smears:    Lab Results   Component Value Date    PAP NIL 2016    PAP NIL 2013    PAP ASC-US 2011       PMH, PSH, Soc Hx, Fam Hx, Meds, and allergies reviewed in Epic.    OBJECTIVE:     /79 (BP Location: Right arm, Cuff Size: Adult Regular)  Pulse 97  Ht 5' 5\" (1.651 m)  Wt 122 lb 9.6 oz (55.6 kg)  LMP  (LMP Unknown)  Breastfeeding? No  BMI 20.4 kg/m2    Gen: Healthy appearing thin female, no acute distress, comfortable, presents with infant and she is loving toward him  HENT: No scleral injection or icterus  CV: Regular rate  Resp: Normal work of breathing, no cough  Breast: regular and symmetric in size and shape, no skin changes.  The lump she points out is barely palpable and even she has trouble finding it for about a minute.  The area she points to is nontender to palpation.  GI: Abdomen soft, non-tender. No masses, organomegaly  Skin: No suspicious lesions or rashes  Psychiatric: mentation appears normal and affect bright  : Normal external female genitalia.  No external lesions, normal hair distribution.   SSE: Speculum exam reveals vaginal epithelium well rugated with normal physiologic discharge. Cervix appears smooth, pink, with no visible lesions.  There is significant descent, grade 1-2 uterovaginal prolapse with valsalva.    Bimanual exam reveals normal size uterus, non-tender, and mobile. No adnexal masses or tenderness. No cervical motion tenderness.     Test Results:  Results for orders placed or performed in visit on 10/27/17 (from the past 24 hour(s))   HCG qualitative urine - CSC and Range   Result Value Ref Range    HCG Qual Urine Negative NEG^Negative     IUD Insertion Procedure Note  10/27/2017     The patient was counseled on the risks, benefits, and alternatives of the procedure. Verbal " and written consent were obtained.    The patient was placed in the dorsal lithotomy position.  A bimanual exam revealed an anteverted uterus.  A speculum was inserted without difficulty. The cervix was cleaned with betadine.  A tenaculum was placed on the anterior lip of the cervix. The uterus was then sounded to 8cm using the endometrial pipelle. A Mirena IUD was inserted in a sterile fashion and placed in the uterus with a 3cm tail. The patient tolerated the procedure with no complications.     Radha Pool MD        ASSESSMENT/PLAN:                                                      1. Contraception  Counseled on different types of contraception including menstrual suppression SE of the Mirena IUD to help control her bothersome symptom of tampons falling out.  She is thinking her  may pursue vasectomy but for now is agreeable to proceed with IUD placement for the SE of menstrual suppression.  - HCG qualitative urine - CSC and Range    2. Feces contents abnormal  See HPI.  Will order stol O&P as desired.  - Ova and Parasite Exam Routine; Future    3. mirena IUD  The patient was instructed to return to clinic in three to four weeks to check the length of the strings if she could not feel them herself at home. Also instructed to call with symptoms of infection such as a fever, heavy bleeding, or severe pain not controlled with over the counter medication. She was advised to use ibuprofen as needed for mild to moderate pain.  She was counseled that the IUD does not protect against STIs and that she will need to have a new device placed in 5 years.  All pt questions were answered.    - HC LEVONORGESTREL IU 52MG 5 YR  - levonorgestrel (MIRENA) 20 MCG/24HR IUD; 1 each (20 mcg) by Intrauterine route continuous  - INSERTION INTRAUTERINE DEVICE    4. Uterovaginal prolapse  Briefly discussed diagnosis and outlined treatment methods including expectant mgmt, pessary, pelvic floor PT, and definitive surgery.   For now will suppress her menses to help her one most bothersome symptom.  Discussed that if the IUD strings prolapse out of her vagina, we can always cut them shorter as she desires.      5. Lump or mass in breast  Discussed that generally her symptoms are benign sounding, and as she was recently breastfeeding it has a fairly high likelihood of being lactational adenoma that may continue to improve over the next several months.  Physical exam is not alarming.  Family history is not overly concerning.  However, if she is worried or going to lose sleep over it, there is no better way to exclude a malignant process than to get a tissue sample.  I don't have any names of surg oncs outside the [facility] system but she will continue to explore this and potentially pursue a biopsy.        Radha Pool MD, MPH  Obstetrics and Gynecology     Total time spent was 45 minutes; greater than 50% of time was spent in counseling and/or coordination of care for the above listed diagnoses, not including time spent on the procedure.

## 2017-10-27 NOTE — NURSING NOTE
"Initial /79 (BP Location: Right arm, Cuff Size: Adult Regular)  Pulse 97  Ht 5' 5\" (1.651 m)  Wt 122 lb 9.6 oz (55.6 kg)  LMP  (LMP Unknown)  Breastfeeding? No  BMI 20.4 kg/m2 Estimated body mass index is 20.4 kg/(m^2) as calculated from the following:    Height as of this encounter: 5' 5\" (1.651 m).    Weight as of this encounter: 122 lb 9.6 oz (55.6 kg). .      "

## 2017-10-28 DIAGNOSIS — R19.5 FECES CONTENTS ABNORMAL: ICD-10-CM

## 2017-10-28 PROCEDURE — 87177 OVA AND PARASITES SMEARS: CPT | Performed by: OBSTETRICS & GYNECOLOGY

## 2017-10-28 PROCEDURE — 87209 SMEAR COMPLEX STAIN: CPT | Performed by: OBSTETRICS & GYNECOLOGY

## 2017-10-30 LAB
O+P STL MICRO: NORMAL
O+P STL MICRO: NORMAL
SPECIMEN SOURCE: NORMAL

## 2017-11-16 ENCOUNTER — TELEPHONE (OUTPATIENT)
Dept: FAMILY MEDICINE | Facility: CLINIC | Age: 37
End: 2017-11-16

## 2017-11-16 ENCOUNTER — OFFICE VISIT (OUTPATIENT)
Dept: FAMILY MEDICINE | Facility: CLINIC | Age: 37
End: 2017-11-16
Payer: COMMERCIAL

## 2017-11-16 VITALS
DIASTOLIC BLOOD PRESSURE: 79 MMHG | OXYGEN SATURATION: 98 % | SYSTOLIC BLOOD PRESSURE: 124 MMHG | TEMPERATURE: 98.1 F | BODY MASS INDEX: 20.14 KG/M2 | HEART RATE: 98 BPM | WEIGHT: 120.9 LBS | RESPIRATION RATE: 16 BRPM | HEIGHT: 65 IN

## 2017-11-16 DIAGNOSIS — L29.0 RECTAL ITCHING: Primary | ICD-10-CM

## 2017-11-16 PROCEDURE — 99213 OFFICE O/P EST LOW 20 MIN: CPT | Performed by: NURSE PRACTITIONER

## 2017-11-16 RX ORDER — ALBENDAZOLE 200 MG/1
400 TABLET, FILM COATED ORAL
Qty: 4 TABLET | Refills: 0 | Status: SHIPPED | OUTPATIENT
Start: 2017-11-16 | End: 2017-11-21

## 2017-11-16 NOTE — MR AVS SNAPSHOT
After Visit Summary   11/16/2017    Germania Hayden    MRN: [id number]           Patient Information     Date Of Birth          1980        Visit Information        Provider Department      11/16/2017 3:00 PM Lashanda Wolff APRN CNP [facility]        Today's Diagnoses     Rectal itching    -  1       Follow-ups after your visit        Additional Services     COLORECTAL SURGERY REFERRAL       Your provider has referred you to: N: [facility] - Cici Timmons [phone]   [email or web address]    Referral Reason(s): Rectal Itching, hx of hemorrhoids.   Special Concerns: Exposure to pinworms- treated.  This referral is: Elective (week +)  It is OK to leave a message on patient's voicemail.    Please be aware that coverage of these services is subject to the terms and limitations of your health insurance plan.  Call member services at your health plan with any benefit or coverage questions.      Please bring the following with you to your appointment:    (1) Any X-Rays, CTs or MRIs which have been performed.  Contact the facility where they were done to arrange for  prior to your scheduled appointment.    (2) List of current medications  (3) This referral request   (4) Any documents/labs given to you for this referral                  Who to contact     If you have questions or need follow up information about today's clinic visit or your schedule please contact [facility] directly at [phone].  Normal or non-critical lab and imaging results will be communicated to you by MyChart, letter or phone within 4 business days after the clinic has received the results. If you do not hear from us within 7 days, please contact the clinic through MyChart or phone. If you have a critical or abnormal lab result, we will notify you by phone as soon as possible.  Submit refill requests through [facility] or call your pharmacy and they will forward  "the refill request to us. Please allow 3 business days for your refill to be completed.          Additional Information About Your Visit        [facility] Information     [facility] gives you secure access to your electronic health record. If you see a primary care provider, you can also send messages to your care team and make appointments. If you have questions, please call your primary care clinic.  If you do not have a primary care provider, please call [phone] and they will assist you.        Care EveryWhere ID     This is your Care EveryWhere ID. This could be used by other organizations to access your [facility] medical records  [id number]        Your Vitals Were     Pulse Temperature Respirations Height Last Period Pulse Oximetry    98 98.1  F (36.7  C) (Tympanic) 16 5' 5\" (1.651 m) (LMP Unknown) 98%    BMI (Body Mass Index)                   20.12 kg/m2            Blood Pressure from Last 3 Encounters:   11/16/17 124/79   10/27/17 114/79   10/23/17 101/70    Weight from Last 3 Encounters:   11/16/17 120 lb 14.4 oz (54.8 kg)   10/27/17 122 lb 9.6 oz (55.6 kg)   10/23/17 123 lb (55.8 kg)              We Performed the Following     COLORECTAL SURGERY REFERRAL          Today's Medication Changes          These changes are accurate as of: 11/16/17  3:47 PM.  If you have any questions, ask your nurse or doctor.               Start taking these medicines.        Dose/Directions    albendazole 200 MG Tabs tablet   Commonly known as:  ALBENZA   Used for:  Rectal itching   Started by:  Lashanda Wolff APRN CNP        Dose:  400 mg   Take 2 tablets (400 mg) by mouth every 14 days for 2 doses   Quantity:  4 tablet   Refills:  0            Where to get your medicines      These medications were sent to [facility] [address] [place] [address] - [address]   [address] [facility] [address]     Phone:  [phone]     albendazole 200 MG Tabs tablet                Primary Care Provider Office Phone # Fax " #    Chioma Nesbitt [phone]       [address] [facility] [address]        Equal Access to Services     FINN YEH : Hadii aad ku hadrileybenny Fatimahanna, juanitaivan gillerrolha, deo claudiadanielle martin, rosi erickin hayaan marilounilay dave gail ortega. So [facility] [phone].    ATENCIÓN: Si habla español, tiene a feliciano disposición servicios gratuitos de asistencia lingüística. Llame al [phone].    We comply with applicable federal civil rights laws and [place] laws. We do not discriminate on the basis of race, color, national origin, age, disability, sex, sexual orientation, or gender identity.            Thank you!     Thank you for choosing [facility]  for your care. Our goal is always to provide you with excellent care. Hearing back from our patients is one way we can continue to improve our services. Please take a few minutes to complete the written survey that you may receive in the mail after your visit with us. Thank you!             Your Updated Medication List - Protect others around you: Learn how to safely use, store and throw away your medicines at www.disposemymeds.org.          This list is accurate as of: 11/16/17  3:47 PM.  Always use your most recent med list.                   Brand Name Dispense Instructions for use Diagnosis    acyclovir 200 MG capsule    ZOVIRAX    240 capsule    Take 1 capsule (200 mg) by mouth 2 times daily    History of cold sores       albendazole 200 MG Tabs tablet    ALBENZA    4 tablet    Take 2 tablets (400 mg) by mouth every 14 days for 2 doses    Rectal itching       levonorgestrel 20 MCG/24HR IUD    MIRENA     1 each (20 mcg) by Intrauterine route continuous    Encounter for insertion of mirena IUD       MULTIVITAMIN WOMEN PO

## 2017-11-16 NOTE — NURSING NOTE
"Chief Complaint   Patient presents with     Rectal Problem       Initial /79 (BP Location: Right arm, Patient Position: Sitting, Cuff Size: Adult Regular)  Pulse 98  Temp 98.1  F (36.7  C) (Tympanic)  Resp 16  Ht 5' 5\" (1.651 m)  Wt 120 lb 14.4 oz (54.8 kg)  LMP  (LMP Unknown)  SpO2 98%  BMI 20.12 kg/m2 Estimated body mass index is 20.12 kg/(m^2) as calculated from the following:    Height as of this encounter: 5' 5\" (1.651 m).    Weight as of this encounter: 120 lb 14.4 oz (54.8 kg).  Medication Reconciliation: complete  "

## 2017-11-16 NOTE — PROGRESS NOTES
SUBJECTIVE:   Germania Hayden is a 37 year old female who presents to clinic today for the following health issues:      Rectal Issues  Onset: ongoing x 2 months, negative stool studies to date, 3rd visit today.    Description:   Pain: no   Itching: YES    Accompanying Signs & Symptoms: looked like she had a worm in her underwear, brings in thread like material in a bag. States she has been reading online and the worms can go into the vaginal environment and die. She believes this is what is in her underwear. Feels like she is breathing them in when she cleans and washes the bedding.   Blood streaked toilet paper: YES- on occasion, denies constipation. States she does have some bulging veins down there since pregnancy.  Blood in stool: no   Changes in stool pattern: no     History:   Any previous GI studies done:none  Family History of colon cancer: no     Precipitating factors:   Worried about pinworm-possible exposure 2 months ago    Alleviating factors:  None    Therapies Tried and outcome: none      Problem list and histories reviewed & adjusted, as indicated.  Additional history: as documented    Patient Active Problem List   Diagnosis     Vulvar varicose veins     mirena IUD     Uterovaginal prolapse     Past Surgical History:   Procedure Laterality Date     ARTHROSCOPIC RECONSTRUCTION ANTERIOR CRUCIATE LIGAMENT  x3    twice on L knee and once on Rt knee     C BREAST AUGMENTATION       wisdom teeth         Social History   Substance Use Topics     Smoking status: Never Smoker     Smokeless tobacco: Never Used     Alcohol use 0.0 oz/week     0 Standard drinks or equivalent per week      Comment: Occassional- quit with pregnancy     Family History   Problem Relation Age of Onset     CANCER Paternal Grandmother 60     ovarian     Breast Cancer Paternal Grandmother      stage 4     CANCER Maternal Grandmother      lymphoma     CANCER Maternal Grandfather      lung ,bone     Cardiovascular Paternal Grandfather   "    MI     GASTROINTESTINAL DISEASE Brother      ulcerative colitis             Reviewed and updated as needed this visit by clinical staffTobacco  Allergies  Med Hx  Surg Hx  Fam Hx  Soc Hx      Reviewed and updated as needed this visit by Provider         ROS:  Constitutional, HEENT, cardiovascular, pulmonary, gi and gu systems are negative, except as otherwise noted.      OBJECTIVE:   /79 (BP Location: Right arm, Patient Position: Sitting, Cuff Size: Adult Regular)  Pulse 98  Temp 98.1  F (36.7  C) (Tympanic)  Resp 16  Ht 5' 5\" (1.651 m)  Wt 120 lb 14.4 oz (54.8 kg)  LMP  (LMP Unknown)  SpO2 98%  BMI 20.12 kg/m2  Body mass index is 20.12 kg/(m^2).  GENERAL: healthy, alert and no distress  RECTAL (female): deferred  NEURO: Normal strength and tone, mentation intact and speech normal      Diagnostic Test Results:  Did look with  under the microscope at the pieces she brought in. Appear to be consistent with thread fibers.     ASSESSMENT/PLAN:       ICD-10-CM    1. Rectal itching L29.0 albendazole (ALBENZA) 200 MG TABS tablet     COLORECTAL SURGERY REFERRAL       CONSULTATION/REFERRAL to COLORECTAL for continued rectal itching.    At this point, this is the 3rd visit for possible pinworm infestation. There is still no concrete evidence for current infection. She is requesting treatment for her whole family today. Her brother (exposure) did not have a concrete diagnosis, he was evaluated for rectal itching and thought he saw a worm in his stool and he was treated with relief of his symptoms. She is to be seen by colorectal for any continued symptoms after treatment. Possible costs and side effects discussed. Patient wants to proceed with treatment.      WILLIAMS Wagner [facility]  "

## 2017-11-17 NOTE — TELEPHONE ENCOUNTER
PA for albenza completed at [facility] my med and faxed to  - will await response    Id number [id number]  [phone]

## 2017-11-20 NOTE — TELEPHONE ENCOUNTER
PA for Albenza tablet has been denied.  Patient needs to have a laboratory confirmed case of pinworms and an inadequate response to pyrantel pamoate.  Pyrantel pamoate is available over the counter.  (According to recent labs patient does not have a confirmed laboratory case)    PA's have been requested for entire family.  Pharmacy notified of denial.

## 2017-11-21 RX ORDER — IVERMECTIN 3 MG/1
TABLET ORAL
Qty: 7 TABLET | Refills: 0 | Status: SHIPPED | OUTPATIENT
Start: 2017-11-21 | End: 2018-03-14

## 2017-11-21 NOTE — TELEPHONE ENCOUNTER
Patient was notified of denial.  She is asking if she can be tested for pin worms because she continues to have symptoms.  She has already tried the OTC pinworms medication.  Patient is wondering if the whole family should be tested, but nobody else has symptoms.    Sangeeta Jackson RN

## 2017-11-22 ENCOUNTER — TELEPHONE (OUTPATIENT)
Dept: FAMILY MEDICINE | Facility: CLINIC | Age: 37
End: 2017-11-22

## 2017-11-22 NOTE — TELEPHONE ENCOUNTER
Please notify patient that I sent a different medication to [facility] for the whole family with the exception of Severiano, he doesn't weigh enough to meet FDA approval. This one does not require a PA and has a near 100% eradication rate after 2 doses spaced 10 days apart. Since the baby does not use the toilet it is not likely that he would be infected. I also put in the pinworm test order if she would like to do that instead.The sticky side of the paddle should be pressed up to multiple spots around the perianal area. Best to do in the early morning or at night. She should do 3 tests spaced 1-2 days apart if she is going to go that route. Thank you. WILLIAMS Jackson CNP

## 2017-11-22 NOTE — TELEPHONE ENCOUNTER
Please notify patient that I sent a different medication to [facility] for the whole family with the exception of Severiano, he doesn't weigh enough to meet FDA approval. This one does not require a PA and has a near 100% eradication rate after 2 doses spaced 10 days apart. Since the baby does not use the toilet it is not likely that he would be infected. I also put in the pinworm test order if she would like to do that instead.The sticky side of the paddle should be pressed up to multiple spots around the perianal area. Best to do in the early morning or at night. She should do 3 tests spaced 1-2 days apart if she is going to go that route. Thank you. WILLIAMS Jackson CNP

## 2018-03-14 ENCOUNTER — OFFICE VISIT (OUTPATIENT)
Dept: OBGYN | Facility: CLINIC | Age: 38
End: 2018-03-14
Payer: COMMERCIAL

## 2018-03-14 ENCOUNTER — TELEPHONE (OUTPATIENT)
Dept: OBGYN | Facility: CLINIC | Age: 38
End: 2018-03-14

## 2018-03-14 VITALS
RESPIRATION RATE: 18 BRPM | TEMPERATURE: 98.3 F | DIASTOLIC BLOOD PRESSURE: 80 MMHG | HEIGHT: 65 IN | WEIGHT: 122.8 LBS | SYSTOLIC BLOOD PRESSURE: 123 MMHG | HEART RATE: 108 BPM | BODY MASS INDEX: 20.46 KG/M2

## 2018-03-14 DIAGNOSIS — N81.4 UTEROVAGINAL PROLAPSE: ICD-10-CM

## 2018-03-14 DIAGNOSIS — Z30.432 ENCOUNTER FOR IUD REMOVAL: Primary | ICD-10-CM

## 2018-03-14 DIAGNOSIS — Z30.011 ENCOUNTER FOR INITIAL PRESCRIPTION OF CONTRACEPTIVE PILLS: ICD-10-CM

## 2018-03-14 PROBLEM — Z30.430 ENCOUNTER FOR INSERTION OF MIRENA IUD: Status: RESOLVED | Noted: 2017-10-27 | Resolved: 2018-03-14

## 2018-03-14 PROCEDURE — 57160 INSERT PESSARY/OTHER DEVICE: CPT | Performed by: OBSTETRICS & GYNECOLOGY

## 2018-03-14 PROCEDURE — A4562 PESSARY, NON RUBBER,ANY TYPE: HCPCS | Performed by: OBSTETRICS & GYNECOLOGY

## 2018-03-14 PROCEDURE — 99213 OFFICE O/P EST LOW 20 MIN: CPT | Mod: 25 | Performed by: OBSTETRICS & GYNECOLOGY

## 2018-03-14 PROCEDURE — 58301 REMOVE INTRAUTERINE DEVICE: CPT | Performed by: OBSTETRICS & GYNECOLOGY

## 2018-03-14 RX ORDER — ACETAMINOPHEN AND CODEINE PHOSPHATE 120; 12 MG/5ML; MG/5ML
1 SOLUTION ORAL DAILY
Qty: 84 TABLET | Refills: 3 | Status: CANCELLED | OUTPATIENT
Start: 2018-03-14

## 2018-03-14 RX ORDER — LEVONORGESTREL/ETHIN.ESTRADIOL 0.1-0.02MG
1 TABLET ORAL DAILY
Qty: 84 TABLET | Refills: 3 | Status: SHIPPED | OUTPATIENT
Start: 2018-03-14 | End: 2018-12-20

## 2018-03-14 NOTE — MR AVS SNAPSHOT
"              After Visit Summary   3/14/2018    Germania Hayden    MRN: [id number]           Patient Information     Date Of Birth          1980        Visit Information        Provider Department      3/14/2018 3:30 PM Leslie Arango MD [facility]        Today's Diagnoses     Encounter for IUD removal    -  1    Uterovaginal prolapse        Encounter for initial prescription of contraceptive pills           Follow-ups after your visit        Who to contact     If you have questions or need follow up information about today's clinic visit or your schedule please contact [facility] directly at [phone].  Normal or non-critical lab and imaging results will be communicated to you by MyChart, letter or phone within 4 business days after the clinic has received the results. If you do not hear from us within 7 days, please contact the clinic through SpeechTranst or phone. If you have a critical or abnormal lab result, we will notify you by phone as soon as possible.  Submit refill requests through Vipshop or call your pharmacy and they will forward the refill request to us. Please allow 3 business days for your refill to be completed.          Additional Information About Your Visit        MyChart Information     V[employer] gives you secure access to your electronic health record. If you see a primary care provider, you can also send messages to your care team and make appointments. If you have questions, please call your primary care clinic.  If you do not have a primary care provider, please call [phone] and they will assist you.        Care EveryWhere ID     This is your Care EveryWhere ID. This could be used by other organizations to access your [facility] medical records  [id number]        Your Vitals Were     Pulse Temperature Respirations Height Breastfeeding? BMI (Body Mass Index)    108 98.3  F (36.8  C) (Tympanic) 18 5' 5\" (1.651 m) No 20.43 kg/m2       Blood " Pressure from Last 3 Encounters:   03/14/18 123/80   11/16/17 124/79   10/27/17 114/79    Weight from Last 3 Encounters:   03/14/18 122 lb 12.8 oz (55.7 kg)   11/16/17 120 lb 14.4 oz (54.8 kg)   10/27/17 122 lb 9.6 oz (55.6 kg)              We Performed the Following     FIT/INSERT INTRAVAG SUPPORT DEVICE/PESSARY     PESSARY, NON RUBBER,ANY TYPE     REMOVE INTRAUTERINE DEVICE          Today's Medication Changes          These changes are accurate as of 3/14/18  3:58 PM.  If you have any questions, ask your nurse or doctor.               Start taking these medicines.        Dose/Directions    levonorgestrel-ethinyl estradiol 0.1-20 MG-MCG per tablet   Commonly known as:  VIVEK TATUM LESSINA   Used for:  Encounter for initial prescription of contraceptive pills   Started by:  Leslie Arango MD        Dose:  1 tablet   Take 1 tablet by mouth daily   Quantity:  84 tablet   Refills:  3         Stop taking these medicines if you haven't already. Please contact your care team if you have questions.     ivermectin 3 MG Tabs tablet   Commonly known as:  STROMECTOL   Stopped by:  Leslie Arango MD                Where to get your medicines      These medications were sent to [facility] [address] [place] [address] [address]   [address] [facility] [address]     Phone:  [phone]     levonorgestrel-ethinyl estradiol 0.1-20 MG-MCG per tablet                Primary Care Provider Office Phone # Fax #    Chioma Nesbitt [phone] [address] [facility] [address]        Equal Access to Services     [facility]ED [facility] : Hadfaustino Sullivan, amy lee, rosi gastelum. So [facility] [phone].    ATENCIÓN: Si habla español, tiene a feliciano disposición servicios gratuitos de asistencia lingüística. Llame al [phone].    We comply with applicable federal civil rights laws and [place] laws. We do not  discriminate on the basis of race, color, national origin, age, disability, sex, sexual orientation, or gender identity.            Thank you!     Thank you for choosing [facility]  for your care. Our goal is always to provide you with excellent care. Hearing back from our patients is one way we can continue to improve our services. Please take a few minutes to complete the written survey that you may receive in the mail after your visit with us. Thank you!             Your Updated Medication List - Protect others around you: Learn how to safely use, store and throw away your medicines at www.disposemymeds.org.          This list is accurate as of 3/14/18  3:58 PM.  Always use your most recent med list.                   Brand Name Dispense Instructions for use Diagnosis    acyclovir 200 MG capsule    ZOVIRAX    240 capsule    Take 1 capsule (200 mg) by mouth 2 times daily    History of cold sores       levonorgestrel 20 MCG/24HR IUD    MIRENA     1 each (20 mcg) by Intrauterine route continuous    Encounter for insertion of mirena IUD       levonorgestrel-ethinyl estradiol 0.1-20 MG-MCG per tablet    AVIANE,ALESSE,LESSINA    84 tablet    Take 1 tablet by mouth daily    Encounter for initial prescription of contraceptive pills       MULTIVITAMIN WOMEN PO           PEPCID PO      Take 20 mg by mouth

## 2018-03-14 NOTE — PROGRESS NOTES
Germania is a 37 year old   female who presents for several issues:  1) would like her Mirena IUD removed as she has gained some weight and is having recurring perioral edema; she would like to go back to her combo BCP she was on previously;    2) would like assessment and treatment of pelvic prolapse; feels a lot of pressure, can feel something protruding at times; no urinary leakage or constipation; hard to wear a tampon, and at times, pain with sex.    Patient Active Problem List    Diagnosis Date Noted     Uterovaginal prolapse 10/27/2017     Priority: Medium     Vulvar varicose veins 2014     Priority: Medium       All systems were reviewed and pertinent information in noted in subjective/HPI.    Past Medical History:   Diagnosis Date     ASCUS favor benign 2011    neg HPV, repeat Pap + HPV cotesting in 3 years     Chickenpox      Depression      Eating disorder     bulemia- age 18 to 32     Uterovaginal prolapse 10/27/2017       Past Surgical History:   Procedure Laterality Date     ARTHROSCOPIC RECONSTRUCTION ANTERIOR CRUCIATE LIGAMENT  x3    twice on L knee and once on Rt knee     C BREAST AUGMENTATION       wisdom teeth           Current Outpatient Prescriptions:      Famotidine (PEPCID PO), Take 20 mg by mouth, Disp: , Rfl:      levonorgestrel-ethinyl estradiol (AVIANE,ALESSE,LESSINA) 0.1-20 MG-MCG per tablet, Take 1 tablet by mouth daily, Disp: 84 tablet, Rfl: 3     Multiple Vitamins-Minerals (MULTIVITAMIN WOMEN PO), , Disp: , Rfl:      levonorgestrel (MIRENA) 20 MCG/24HR IUD, 1 each (20 mcg) by Intrauterine route continuous, Disp: , Rfl:      acyclovir (ZOVIRAX) 200 MG capsule, Take 1 capsule (200 mg) by mouth 2 times daily, Disp: 240 capsule, Rfl: 11    ALLERGIES:  Review of patient's allergies indicates no known allergies.    Social History     Social History     Marital status:      Spouse name: Eris     Number of children: 2     Years of education: N/A     Occupational History  "     [employer]     Social History Main Topics     Smoking status: Never Smoker     Smokeless tobacco: Never Used     Alcohol use 0.0 oz/week     0 Standard drinks or equivalent per week      Comment: Occassional- quit with pregnancy     Drug use: No     Sexual activity: Yes     Partners: Male     Birth control/ protection: Pill     Other Topics Concern     Parent/Sibling W/ Cabg, Mi Or Angioplasty Before 65f 55m? No     Social History Narrative       Family History   Problem Relation Age of Onset     CANCER Paternal Grandmother 60     ovarian     Breast Cancer Paternal Grandmother      stage 4     CANCER Maternal Grandmother      lymphoma     CANCER Maternal Grandfather      lung ,bone     Cardiovascular Paternal Grandfather      MI     GASTROINTESTINAL DISEASE Brother      ulcerative colitis       OBJECTIVE:  Vitals: /80 (BP Location: Left arm, Patient Position: Chair, Cuff Size: Adult Regular)  Pulse 108  Temp 98.3  F (36.8  C) (Tympanic)  Resp 18  Ht 5' 5\" (1.651 m)  Wt 122 lb 12.8 oz (55.7 kg)  Breastfeeding? No  BMI 20.43 kg/m2 BMI= Body mass index is 20.43 kg/(m^2).   No LMP recorded. Patient is not currently having periods (Reason: IUD).     GENERAL APPEARANCE: healthy, alert and no distress  ABDOMEN:  soft, nontender, no hepato-splenomegaly or hernias  PELVIC:  EGBUS:  Gaping perineum  VAGINA:  2-3+ cystocele, 2+ cervical decensus, 1+ rectocele with strain  CERVIX:  smooth, non-friable, no gross lesions, thin-layer PAP was not taken   UTERUS:  anteverted, not enlarged, non tender  ADNEXAE:  non-tender, no masses palpable, no cul de sac nodularity    Pt consented for removal of existing IUD; the string was located visually and the IUD removed with a ring forceps pt tolerated the procedure well.    She was then fit with a size 4 ring with support pessary, and given instructions in care, daily placement, removal of cleaning; we also discussed in detail potential future surgical " repair (TVH, A & P repair)    ASSESSMENT:      ICD-10-CM    1. Encounter for IUD removal Z30.432 REMOVE INTRAUTERINE DEVICE   2. Uterovaginal prolapse N81.4    3. Encounter for initial prescription of contraceptive pills Z30.011 levonorgestrel-ethinyl estradiol (AVIANE,VIVEKLESSINA) 0.1-20 MG-MCG per tablet       PLAN:  Rx for Aviane given; pt instructed in pessary care and will RETURN TO CLINIC prn ongoing problems or desire to pursue surgical repair  Leslie Arango MD  [facility]      Leslie Arango MD

## 2018-03-14 NOTE — TELEPHONE ENCOUNTER
Reason for Call:  Other appointment    Detailed comments: Pt wants to know if she can be worked in sooner than 4/10/18 to have a IUD removed because she is having some rash/derm issues with it.    Phone Number Patient can be reached at: Cell number on file:    Telephone Information:   Mobile [phone]       Best Time: today    Can we leave a detailed message on this number? NO    Call taken on 3/14/2018 at 12:37 PM by Agata Barker

## 2018-03-14 NOTE — TELEPHONE ENCOUNTER
Pt  Would like to have IUD removed as soon as possible. Please call to advise.      Thank you,   Wiliam BALL   Central Scheduling  [phone]

## 2018-03-14 NOTE — TELEPHONE ENCOUNTER
Return call to patient.  Left message on identifiable voice mail for patient to return call to clinic and speak with Kala in scheduling.  Dr. Arango can see patient this afternoon at 2:15 pm if patient can be flexible as Dr. Arango is on call and may get called away for a delivery.    Gemma Miller   [facility]  RN

## 2018-04-19 ENCOUNTER — TELEPHONE (OUTPATIENT)
Dept: FAMILY MEDICINE | Facility: CLINIC | Age: 38
End: 2018-04-19

## 2018-04-19 ENCOUNTER — RECORDS - HEALTHEAST (OUTPATIENT)
Dept: ADMINISTRATIVE | Facility: OTHER | Age: 38
End: 2018-04-19

## 2018-04-19 ENCOUNTER — OFFICE VISIT (OUTPATIENT)
Dept: FAMILY MEDICINE | Facility: CLINIC | Age: 38
End: 2018-04-19
Payer: COMMERCIAL

## 2018-04-19 VITALS
DIASTOLIC BLOOD PRESSURE: 79 MMHG | WEIGHT: 123.4 LBS | BODY MASS INDEX: 20.56 KG/M2 | SYSTOLIC BLOOD PRESSURE: 116 MMHG | HEART RATE: 93 BPM | TEMPERATURE: 96.4 F | HEIGHT: 65 IN

## 2018-04-19 DIAGNOSIS — D24.2 FIBROADENOMA OF BREAST, LEFT: Primary | ICD-10-CM

## 2018-04-19 DIAGNOSIS — N64.4 BREAST PAIN, LEFT: Primary | ICD-10-CM

## 2018-04-19 DIAGNOSIS — N64.4 BREAST PAIN: ICD-10-CM

## 2018-04-19 DIAGNOSIS — N63.0 LUMP OR MASS IN BREAST: ICD-10-CM

## 2018-04-19 DIAGNOSIS — Z86.19 H/O COLD SORES: ICD-10-CM

## 2018-04-19 DIAGNOSIS — Z98.82 HISTORY OF BREAST IMPLANT: ICD-10-CM

## 2018-04-19 LAB
ALBUMIN SERPL-MCNC: 4 G/DL (ref 3.4–5)
ALP SERPL-CCNC: 43 U/L (ref 40–150)
ALT SERPL W P-5'-P-CCNC: 22 U/L (ref 0–50)
ANION GAP SERPL CALCULATED.3IONS-SCNC: 1 MMOL/L (ref 3–14)
AST SERPL W P-5'-P-CCNC: 20 U/L (ref 0–45)
BILIRUB SERPL-MCNC: 0.4 MG/DL (ref 0.2–1.3)
BUN SERPL-MCNC: 12 MG/DL (ref 7–30)
CALCIUM SERPL-MCNC: 8.9 MG/DL (ref 8.5–10.1)
CHLORIDE SERPL-SCNC: 107 MMOL/L (ref 94–109)
CO2 SERPL-SCNC: 32 MMOL/L (ref 20–32)
CREAT SERPL-MCNC: 0.67 MG/DL (ref 0.52–1.04)
GFR SERPL CREATININE-BSD FRML MDRD: >90 ML/MIN/1.7M2
GLUCOSE SERPL-MCNC: 86 MG/DL (ref 70–99)
POTASSIUM SERPL-SCNC: 4.1 MMOL/L (ref 3.4–5.3)
PROT SERPL-MCNC: 7.5 G/DL (ref 6.8–8.8)
SODIUM SERPL-SCNC: 140 MMOL/L (ref 133–144)

## 2018-04-19 PROCEDURE — 80053 COMPREHEN METABOLIC PANEL: CPT | Performed by: NURSE PRACTITIONER

## 2018-04-19 PROCEDURE — 36415 COLL VENOUS BLD VENIPUNCTURE: CPT | Performed by: NURSE PRACTITIONER

## 2018-04-19 PROCEDURE — 99214 OFFICE O/P EST MOD 30 MIN: CPT | Performed by: NURSE PRACTITIONER

## 2018-04-19 NOTE — MR AVS SNAPSHOT
After Visit Summary   4/19/2018    Germania Hayden    MRN: [id number]           Patient Information     Date Of Birth          1980        Visit Information        Provider Department      4/19/2018 8:20 AM Alexandra Hart NP [facility]        Today's Diagnoses     Fibroadenoma of breast, left    -  1    Breast pain        Lump or mass in breast        History of breast implant        H/O cold sores          Care Instructions      Breast Lump, Uncertain Cause    A lump was found in your breast. Most breast lumps are not cancer. They may be caused by normal changes in the breast tissue due to hormone variations that occur with your menstrual cycle. Some women may form lumps that are painful and tender. Others may form lumps that are painless.  At this time, is not possible to be certain of the cause of your lump without further evaluation. This could include:    Another exam by your healthcare provider or a gynecologist    Imaging tests, such as a mammogram or ultrasound    Biopsy (procedure to remove small tissue samples from the breast lump)  Your healthcare provider will explain any additional testing that is needed. Be sure to get answers to any questions you may have.  Home care  Until a diagnosis is made, you may be advised to do the following:    If you are having breast pain:  ? Take an over-the-counter pain reliever, if directed to by your provider.  ? Wear a well-fitted bra or sports bra for extra support. If you have breast pain at night, try wearing the bra during sleep.  ? Apply a warm compress (towel soaked in warm water) to the breast. You may also use a hot water bottle.    Check your breasts each day. Keep a log of whether the lump seems to be changing in size or tenderness with your period. This can help your healthcare provider make the correct diagnosis.  Follow-up care  Follow up with your healthcare provider, or as directed. Keep all appointments. Also,  prepare for any upcoming tests as directed.  When to seek medical advice  Call your healthcare provider right away if any of these occur:    Fever of 100.4 F (38 C) or higher    Redness or swelling of the breast    Discharge from the nipple    Visible changes in the skin over the nipple or breast    Lump grows larger, feels very hard, or has an irregular shape    New lumps form  Date Last Reviewed: 3/1/2017    2009-2343 The [employer]. [address] [place] [address]. All rights reserved. This information is not intended as a substitute for professional medical care. Always follow your healthcare professional's instructions.                Follow-ups after your visit        Additional Services     BREAST CENTER REFERRAL       Your provider has referred you to: [facility] [phone]    Please be aware that coverage of these services is subject to the terms and limitations of your health insurance plan.  Call member services at your health plan with any benefit or coverage questions.      Please bring the following with you to your appointment:    (1) Any X-Rays, CTs or MRIs which have been performed.  Contact the facility where they were done to arrange for  prior to your scheduled appointment.    (2) List of current medications   (3) This referral request   (4) Any documents/labs given to you for this referral                  Who to contact     If you have questions or need follow up information about today's clinic visit or your schedule please contact [facility] directly at [phone].  Normal or non-critical lab and imaging results will be communicated to you by MyChart, letter or phone within 4 business days after the clinic has received the results. If you do not hear from us within 7 days, please contact the clinic through MyChart or phone. If you have a critical or abnormal lab result, we will notify you by phone as soon as possible.  Submit refill  "requests through e[employer]ruction [employer] or call your pharmacy and they will forward the refill request to us. Please allow 3 business days for your refill to be completed.          Additional Information About Your Visit        HUNT Mobile Adshart Information     e[employer]ruction [employer] gives you secure access to your electronic health record. If you see a primary care provider, you can also send messages to your care team and make appointments. If you have questions, please call your primary care clinic.  If you do not have a primary care provider, please call [phone] and they will assist you.        Care EveryWhere ID     This is your Care EveryWhere ID. This could be used by other organizations to access your [facility] medical records  [id number]        Your Vitals Were     Pulse Temperature Height BMI (Body Mass Index)          93 96.4  F (35.8  C) (Tympanic) 5' 5\" (1.651 m) 20.53 kg/m2         Blood Pressure from Last 3 Encounters:   04/19/18 116/79   03/14/18 123/80   11/16/17 124/79    Weight from Last 3 Encounters:   04/19/18 123 lb 6.4 oz (56 kg)   03/14/18 122 lb 12.8 oz (55.7 kg)   11/16/17 120 lb 14.4 oz (54.8 kg)              We Performed the Following     BREAST CENTER REFERRAL     Comprehensive metabolic panel        Primary Care Provider Office Phone # Fax #    Chioma Nesbitt [phone] [address] [facility] [address]        Equal Access to Services     FINN YEH AH: Hadii april ku hadasho Sojoyali, waaxda luqadaha, qaybta kaalmada adeegyada, rosi ortega. So [facility] [phone].    ATENCIÓN: Si habla español, tiene a feliciano disposición servicios gratuitos de asistencia lingüística. Grady whalen [phone].    We comply with applicable federal civil rights laws and [place] laws. We do not discriminate on the basis of race, color, national origin, age, disability, sex, sexual orientation, or gender identity.            Thank you!     Thank you for choosing [facility]  for " your care. Our goal is always to provide you with excellent care. Hearing back from our patients is one way we can continue to improve our services. Please take a few minutes to complete the written survey that you may receive in the mail after your visit with us. Thank you!             Your Updated Medication List - Protect others around you: Learn how to safely use, store and throw away your medicines at www.disposemymeds.org.          This list is accurate as of 4/19/18  8:47 AM.  Always use your most recent med list.                   Brand Name Dispense Instructions for use Diagnosis    acyclovir 200 MG capsule    ZOVIRAX    240 capsule    Take 1 capsule (200 mg) by mouth 2 times daily    History of cold sores       levonorgestrel-ethinyl estradiol 0.1-20 MG-MCG per tablet    AVIANE,ALESSE,LESSINA    84 tablet    Take 1 tablet by mouth daily    Encounter for initial prescription of contraceptive pills       MULTIVITAMIN WOMEN PO           PEPCID PO      Take 20 mg by mouth

## 2018-04-19 NOTE — TELEPHONE ENCOUNTER
[facility] called and is asking provider to order a bilateral diagnostic mammogram due to breast pain per their policy. This can be faxed to them at [phone]. Spoke with Shawna from diagnostics [facility] and her call back number is [phone]. Order is cued up, please advise.    MERCED Ibrahim

## 2018-04-19 NOTE — NURSING NOTE
"Chief Complaint   Patient presents with     Breast Pain     Left breast pain for one year, had an ultrasound through [facility] and needs to repeat ultraounsd, [facility]       Initial /79  Pulse 93  Temp 96.4  F (35.8  C) (Tympanic)  Ht 5' 5\" (1.651 m)  Wt 123 lb 6.4 oz (56 kg)  BMI 20.53 kg/m2 Estimated body mass index is 20.53 kg/(m^2) as calculated from the following:    Height as of this encounter: 5' 5\" (1.651 m).    Weight as of this encounter: 123 lb 6.4 oz (56 kg).  Medication Reconciliation: complete  "

## 2018-04-19 NOTE — TELEPHONE ENCOUNTER
Alexandra Hart, please see request from patient for US to be done at [facility], have pended order, please review and sign if approves.    MERCED Ibrahim

## 2018-04-19 NOTE — PATIENT INSTRUCTIONS
Breast Lump, Uncertain Cause    A lump was found in your breast. Most breast lumps are not cancer. They may be caused by normal changes in the breast tissue due to hormone variations that occur with your menstrual cycle. Some women may form lumps that are painful and tender. Others may form lumps that are painless.  At this time, is not possible to be certain of the cause of your lump without further evaluation. This could include:    Another exam by your healthcare provider or a gynecologist    Imaging tests, such as a mammogram or ultrasound    Biopsy (procedure to remove small tissue samples from the breast lump)  Your healthcare provider will explain any additional testing that is needed. Be sure to get answers to any questions you may have.  Home care  Until a diagnosis is made, you may be advised to do the following:    If you are having breast pain:  ? Take an over-the-counter pain reliever, if directed to by your provider.  ? Wear a well-fitted bra or sports bra for extra support. If you have breast pain at night, try wearing the bra during sleep.  ? Apply a warm compress (towel soaked in warm water) to the breast. You may also use a hot water bottle.    Check your breasts each day. Keep a log of whether the lump seems to be changing in size or tenderness with your period. This can help your healthcare provider make the correct diagnosis.  Follow-up care  Follow up with your healthcare provider, or as directed. Keep all appointments. Also, prepare for any upcoming tests as directed.  When to seek medical advice  Call your healthcare provider right away if any of these occur:    Fever of 100.4 F (38 C) or higher    Redness or swelling of the breast    Discharge from the nipple    Visible changes in the skin over the nipple or breast    Lump grows larger, feels very hard, or has an irregular shape    New lumps form  Date Last Reviewed: 3/1/2017    9254-2945 [employer] [facility] [facility]. [address] [facility],  [place] [address]. All rights reserved. This information is not intended as a substitute for professional medical care. Always follow your healthcare professional's instructions.

## 2018-04-19 NOTE — TELEPHONE ENCOUNTER
Reason for Call:  Other orders    Detailed comments: Patient is asking for orders to the [facility] for a left breast US.  Fax number is [phone]        Phone Number Patient can be reached at: Home number on file [phone] (home)    Best Time: any    Can we leave a detailed message on this number? YES    Call taken on 4/19/2018 at 9:55 AM by Rubia Garcia

## 2018-04-19 NOTE — PROGRESS NOTES
SUBJECTIVE:   Germania Hayden is a 37 year old female who presents to clinic today for the following health issues:    Chief Complaint   Patient presents with     Breast Pain     Left breast pain for one year, had an ultrasound through [facility] and needs to repeat ultraounsd, would like to get thius done through [facility]         Left breast pain       Duration: One year     Description (location/character/radiation): Left breast pain, had mammogram and ultrasound done last year and they found a discrete well- defined ovoid nodule. Didn't recommend a biopsy at the time, was told she will need to repeat ultrasound.     Intensity:  Mild pain is still there 2/10     Accompanying signs and symptoms: Patient has breast implants     History (similar episodes/previous evaluation): YES    Precipitating or alleviating factors: None    Therapies tried and outcome: None    Started having pain in Sept in left breast intermittent - was getting worse and more frequent.  Does have history of bilateral implants - placed 3 years ago.  Felt lump after pain started - pea size at the time.  Went to plastic surgeon given history of implants - ordered mammogram and US.    Had this done at [facility] and US.    ULTRASOUND: Scans of the palpable lump 3:00 zone 2 demonstrates a discrete well-defined ovoid nodule that measures 1.0 x 0.9 x 0.5 cm. Nodule has a sharp echogenic boundary and is situated with long axis parallel to chest wall and skin. This is very   consistent with a benign fibroadenoma. This lesion directly abuts the implant.        Was prescribed oral contraceptive pills - but chose to wait to take this.  Pain had improved with IUD.     Family history with PGM with breast cancer at age 60.    Problem list and histories reviewed & adjusted, as indicated.  Additional history: as documented    Patient Active Problem List   Diagnosis     Vulvar varicose veins     Uterovaginal prolapse     Past Surgical History:    Procedure Laterality Date     ARTHROSCOPIC RECONSTRUCTION ANTERIOR CRUCIATE LIGAMENT  x3    twice on L knee and once on Rt knee     C BREAST AUGMENTATION       wisdom teeth         Social History   Substance Use Topics     Smoking status: Never Smoker     Smokeless tobacco: Never Used     Alcohol use 0.0 oz/week     0 Standard drinks or equivalent per week      Comment: Occassional- quit with pregnancy     Family History   Problem Relation Age of Onset     CANCER Paternal Grandmother 60     ovarian     Breast Cancer Paternal Grandmother      stage 4     CANCER Maternal Grandmother      lymphoma     CANCER Maternal Grandfather      lung ,bone     Cardiovascular Paternal Grandfather      MI     GASTROINTESTINAL DISEASE Brother      ulcerative colitis         Current Outpatient Prescriptions   Medication Sig Dispense Refill     levonorgestrel-ethinyl estradiol (AVIANE,ALESSE,LESSINA) 0.1-20 MG-MCG per tablet Take 1 tablet by mouth daily 84 tablet 3     Multiple Vitamins-Minerals (MULTIVITAMIN WOMEN PO)        acyclovir (ZOVIRAX) 200 MG capsule Take 1 capsule (200 mg) by mouth 2 times daily (Patient not taking: Reported on 4/19/2018) 240 capsule 11     Famotidine (PEPCID PO) Take 20 mg by mouth       No Known Allergies  Recent Labs   Lab Test  08/15/14   2145   CR  0.55   GFRESTIMATED  >90  Non  GFR Calc     GFRESTBLACK  >90   GFR Calc     POTASSIUM  3.8      BP Readings from Last 3 Encounters:   04/19/18 116/79   03/14/18 123/80   11/16/17 124/79    Wt Readings from Last 3 Encounters:   04/19/18 123 lb 6.4 oz (56 kg)   03/14/18 122 lb 12.8 oz (55.7 kg)   11/16/17 120 lb 14.4 oz (54.8 kg)                  Labs reviewed in EPIC    Reviewed and updated as needed this visit by clinical staff       Reviewed and updated as needed this visit by Provider         ROS:  Constitutional, HEENT, cardiovascular, pulmonary, GI, , musculoskeletal, neuro, skin, endocrine and psych systems are  "negative, except as otherwise noted.    OBJECTIVE:     /79  Pulse 93  Temp 96.4  F (35.8  C) (Tympanic)  Ht 5' 5\" (1.651 m)  Wt 123 lb 6.4 oz (56 kg)  BMI 20.53 kg/m2  Body mass index is 20.53 kg/(m^2).  GENERAL: healthy, alert and no distress  BREAST: no palpable axillary masses or adenopathy and mass left breast upper left quadrant pea size, hard, non mobile.    Diagnostic Test Results:  None    ASSESSMENT/PLAN:     1. Fibroadenoma of breast, left     - BREAST CENTER REFERRAL    2. Breast pain     - BREAST CENTER REFERRAL    3. Lump or mass in breast     - BREAST CENTER REFERRAL    4. History of breast implant     - BREAST CENTER REFERRAL    5. H/O cold sores   On Acyclovir for 5 years.  Labs today.    - Comprehensive metabolic panel    Total times spent with patient 25 minutes of which > 50% of the time was spent counseling and coordination of care discussion of above complaints, questions, medications, answered questions on health, concerns, follow up and referral.    Patient Instructions     Breast Lump, Uncertain Cause    A lump was found in your breast. Most breast lumps are not cancer. They may be caused by normal changes in the breast tissue due to hormone variations that occur with your menstrual cycle. Some women may form lumps that are painful and tender. Others may form lumps that are painless.  At this time, is not possible to be certain of the cause of your lump without further evaluation. This could include:    Another exam by your healthcare provider or a gynecologist    Imaging tests, such as a mammogram or ultrasound    Biopsy (procedure to remove small tissue samples from the breast lump)  Your healthcare provider will explain any additional testing that is needed. Be sure to get answers to any questions you may have.  Home care  Until a diagnosis is made, you may be advised to do the following:    If you are having breast pain:  ? Take an over-the-counter pain reliever, if directed to " by your provider.  ? Wear a well-fitted bra or sports bra for extra support. If you have breast pain at night, try wearing the bra during sleep.  ? Apply a warm compress (towel soaked in warm water) to the breast. You may also use a hot water bottle.    Check your breasts each day. Keep a log of whether the lump seems to be changing in size or tenderness with your period. This can help your healthcare provider make the correct diagnosis.  Follow-up care  Follow up with your healthcare provider, or as directed. Keep all appointments. Also, prepare for any upcoming tests as directed.  When to seek medical advice  Call your healthcare provider right away if any of these occur:    Fever of 100.4 F (38 C) or higher    Redness or swelling of the breast    Discharge from the nipple    Visible changes in the skin over the nipple or breast    Lump grows larger, feels very hard, or has an irregular shape    New lumps form  Date Last Reviewed: 3/1/2017    2473-9499 [employer]. [address] [place] [address], [place] [address]. All rights reserved. This information is not intended as a substitute for professional medical care. Always follow your healthcare professional's instructions.            Alexandra Hart NP  [facility]

## 2018-05-10 ENCOUNTER — TRANSFERRED RECORDS (OUTPATIENT)
Dept: HEALTH INFORMATION MANAGEMENT | Facility: CLINIC | Age: 38
End: 2018-05-10

## 2018-05-10 ENCOUNTER — HOSPITAL ENCOUNTER (OUTPATIENT)
Dept: MAMMOGRAPHY | Facility: CLINIC | Age: 38
Discharge: HOME | End: 2018-05-10

## 2018-05-10 DIAGNOSIS — D24.2 FIBROADENOMA OF BREAST, LEFT: ICD-10-CM

## 2018-05-10 DIAGNOSIS — N64.4 BREAST PAIN: ICD-10-CM

## 2018-05-17 ENCOUNTER — MEDICAL CORRESPONDENCE (OUTPATIENT)
Dept: HEALTH INFORMATION MANAGEMENT | Facility: CLINIC | Age: 38
End: 2018-05-17

## 2018-05-20 ENCOUNTER — MEDICAL CORRESPONDENCE (OUTPATIENT)
Dept: FAMILY MEDICINE | Facility: CLINIC | Age: 38
End: 2018-05-20

## 2018-05-21 ENCOUNTER — TELEPHONE (OUTPATIENT)
Dept: FAMILY MEDICINE | Facility: CLINIC | Age: 38
End: 2018-05-21

## 2018-05-22 ENCOUNTER — RECORDS - HEALTHEAST (OUTPATIENT)
Dept: ADMINISTRATIVE | Facility: OTHER | Age: 38
End: 2018-05-22

## 2018-05-24 ENCOUNTER — TRANSFERRED RECORDS (OUTPATIENT)
Dept: HEALTH INFORMATION MANAGEMENT | Facility: CLINIC | Age: 38
End: 2018-05-24

## 2018-05-24 ENCOUNTER — HOSPITAL ENCOUNTER (OUTPATIENT)
Dept: MAMMOGRAPHY | Facility: CLINIC | Age: 38
Discharge: HOME | End: 2018-05-24
Admitting: RADIOLOGY

## 2018-05-24 ENCOUNTER — HOSPITAL ENCOUNTER (OUTPATIENT)
Dept: MAMMOGRAPHY | Facility: CLINIC | Age: 38
Discharge: HOME | End: 2018-05-24

## 2018-05-24 DIAGNOSIS — N63.20 LUMP OF LEFT BREAST: ICD-10-CM

## 2018-05-25 LAB
LAB AP CHARGES (HE HISTORICAL CONVERSION): NORMAL
LAB AP INITIAL CYTO EVAL (HE HISTORICAL CONVERSION): NORMAL
LAB MED GENERAL PATH INTERP (HE HISTORICAL CONVERSION): NORMAL
PATH REPORT.COMMENTS IMP SPEC: NORMAL
PATH REPORT.COMMENTS IMP SPEC: NORMAL
PATH REPORT.FINAL DX SPEC: NORMAL
PATH REPORT.MICROSCOPIC SPEC OTHER STN: NORMAL
PATH REPORT.RELEVANT HX SPEC: NORMAL
SPECIMEN DESCRIPTION: NORMAL

## 2018-05-29 ENCOUNTER — COMMUNICATION - HEALTHEAST (OUTPATIENT)
Dept: MAMMOGRAPHY | Facility: CLINIC | Age: 38
End: 2018-05-29

## 2018-07-05 DIAGNOSIS — Z86.19 HISTORY OF COLD SORES: ICD-10-CM

## 2018-07-05 RX ORDER — ACYCLOVIR 200 MG/1
200 CAPSULE ORAL 2 TIMES DAILY
Qty: 180 CAPSULE | Refills: 3 | Status: SHIPPED | OUTPATIENT
Start: 2018-07-05 | End: 2019-08-02

## 2018-07-05 NOTE — TELEPHONE ENCOUNTER
3/14/18 last office visit for IUD removal and pelvic prolapse assessment.  Medication not discussed.  Comprehensive metabolic panel 4/19/18.    Gemma Miller   [facility]  RN

## 2018-12-20 ENCOUNTER — OFFICE VISIT (OUTPATIENT)
Dept: DERMATOLOGY | Facility: CLINIC | Age: 38
End: 2018-12-20
Payer: COMMERCIAL

## 2018-12-20 VITALS — HEART RATE: 98 BPM | SYSTOLIC BLOOD PRESSURE: 113 MMHG | OXYGEN SATURATION: 99 % | DIASTOLIC BLOOD PRESSURE: 70 MMHG

## 2018-12-20 DIAGNOSIS — D48.5 NEOPLASM OF UNCERTAIN BEHAVIOR OF SKIN: Primary | ICD-10-CM

## 2018-12-20 DIAGNOSIS — D22.9 MULTIPLE BENIGN NEVI: ICD-10-CM

## 2018-12-20 DIAGNOSIS — D18.01 CHERRY ANGIOMA: ICD-10-CM

## 2018-12-20 DIAGNOSIS — L82.1 SEBORRHEIC KERATOSIS: ICD-10-CM

## 2018-12-20 DIAGNOSIS — L81.4 LENTIGO: ICD-10-CM

## 2018-12-20 PROCEDURE — 88305 TISSUE EXAM BY PATHOLOGIST: CPT | Mod: TC | Performed by: PHYSICIAN ASSISTANT

## 2018-12-20 PROCEDURE — 11101 HC BIOPSY SKIN/SUBQ/MUC MEM, EACH ADDTL LESION: CPT | Performed by: PHYSICIAN ASSISTANT

## 2018-12-20 PROCEDURE — 11100 HC BIOPSY SKIN/SUBQ/MUC MEM, SINGLE LESION: CPT | Performed by: PHYSICIAN ASSISTANT

## 2018-12-20 PROCEDURE — 99213 OFFICE O/P EST LOW 20 MIN: CPT | Mod: 25 | Performed by: PHYSICIAN ASSISTANT

## 2018-12-20 NOTE — LETTER
12/20/2018         RE: Germania Hayden  [address]  [place] [place] [id number]        Dear Colleague,    Thank you for referring your patient, Germania Hayden, to the [facility]. Please see a copy of my visit note below.    Germania Hayden is a 38 year old year old female patient here today for skin check. She notes a spot on side of neck that has been getting bigger. Patient has no other skin complaints today.  Remainder of the HPI, Meds, PMH, Allergies, FH, and SH was reviewed in chart.    Pertinent Hx:   No personal history of skin cancer.   Past Medical History:   Diagnosis Date     ASCUS favor benign 12/2011    neg HPV, repeat Pap + HPV cotesting in 3 years     Chickenpox      Depression      Eating disorder     bulemia- age 18 to 32     Uterovaginal prolapse 10/27/2017       Past Surgical History:   Procedure Laterality Date     ARTHROSCOPIC RECONSTRUCTION ANTERIOR CRUCIATE LIGAMENT  x3    twice on L knee and once on Rt knee     C BREAST AUGMENTATION       wisdom teeth          Family History   Problem Relation Age of Onset     Cancer Paternal Grandmother 60        ovarian     Breast Cancer Paternal Grandmother         stage 4     Cancer Maternal Grandmother         lymphoma     Cancer Maternal Grandfather         lung ,bone     Cardiovascular Paternal Grandfather         MI     Gastrointestinal Disease Brother         ulcerative colitis     Melanoma No family hx of        Social History     Socioeconomic History     Marital status:      Spouse name: Eris     Number of children: 2     Years of education: Not on file     Highest education level: Not on file   Social Needs     Financial resource strain: Not on file     Food insecurity - worry: Not on file     Food insecurity - inability: Not on file     Transportation needs - medical: Not on file     Transportation needs - non-medical: Not on file   Occupational History     Employer: [employer]   Tobacco Use      Smoking status: Never Smoker     Smokeless tobacco: Never Used   Substance and Sexual Activity     Alcohol use: Yes     Alcohol/week: 0.0 oz     Comment: Occassional- quit with pregnancy     Drug use: No     Sexual activity: Yes     Partners: Male     Birth control/protection: Pill   Other Topics Concern     Parent/sibling w/ CABG, MI or angioplasty before 65F 55M? No   Social History Narrative     Not on file       Outpatient Encounter Medications as of 12/20/2018   Medication Sig Dispense Refill     Multiple Vitamins-Minerals (MULTIVITAMIN WOMEN PO)        acyclovir (ZOVIRAX) 200 MG capsule Take 1 capsule (200 mg) by mouth 2 times daily (Patient not taking: Reported on 12/20/2018) 180 capsule 3     [DISCONTINUED] Famotidine (PEPCID PO) Take 20 mg by mouth       [DISCONTINUED] levonorgestrel-ethinyl estradiol (AVIANE,ALESSE,LESSINA) 0.1-20 MG-MCG per tablet Take 1 tablet by mouth daily 84 tablet 3     No facility-administered encounter medications on file as of 12/20/2018.              Review Of Systems  Skin: As above  Eyes: negative  Ears/Nose/Throat: negative  Respiratory: No shortness of breath, dyspnea on exertion, cough, or hemoptysis  Cardiovascular: negative  Gastrointestinal: negative  Genitourinary: negative  Musculoskeletal: negative  Neurologic: negative  Psychiatric: negative  Hematologic/Lymphatic/Immunologic: negative  Endocrine: negative      O:   NAD, WDWN, Alert & Oriented, Mood & Affect wnl, Vitals stable   Here today alone   /70   Pulse 98   SpO2 99%    General appearance normal   Vitals stable   Alert, oriented and in no acute distress     Stuck on papules and brown macules on trunk and ext   Red papules on trunk  Brown papules and macule with regular pigment network and borders on torso and upper extremities   0.6 cm skin colored papule on right lateral neck  0.4 thin brown papule on right anterior neck  0.5 cm skin colored papule on right upper arm    The remainder of expanded  problem focused exam was unremarkable; the following areas were examined:  scalp/hair, conjunctiva/lids, face, neck, lips/teeth, chest, digits/nails, RUE, LUE.      Eyes: Conjunctivae/lids:Normal     ENT: Lips, buccal mucosa, tongue: normal    MSK:Normal    Pulm: Breathing Normal    Neuro/Psych: Orientation:Normal; Mood/Affect:Normal  A/P:  1. R/O inflamed nevus on right upper arm and right lateral neck   TANGENTIAL BIOPSY SENT OUT:  After consent, anesthesia with LEC and prep, tangential excision performed and specimen sent out for permanent section histology.  No complications and routine wound care. Patient told to call our office in 1-2 weeks for result.      2. R/O seborrheic keratosis on right anterior neck TANGENTIAL BIOPSY SENT OUT:  After consent, anesthesia with LEC and prep, tangential excision performed and specimen sent out for permanent section histology.  No complications and routine wound care. Patient told to call our office in 1-2 weeks for result.      3. Seborrheic keratosis, lentigo, angioma, benign nevi   BENIGN LESIONS DISCUSSED WITH PATIENT:  I discussed the specifics of tumor, prognosis, and genetics of benign lesions.  I explained that treatment of these lesions would be purely cosmetic and not medically neccessary.  I discussed with patient different removal options including excision, cautery and /or laser.      Nature and genetics of benign skin lesions dicussed with patient.  Signs and Symptoms of skin cancer discussed with patient.  ABCDEs of melanoma reviewed with patient.  Patient encouraged to perform monthly skin exams.  UV precautions reviewed with patient.  Risks of non-melanoma skin cancer discussed with patient   Return to clinic pending biopsy results.         Again, thank you for allowing me to participate in the care of your patient.        Sincerely,        Paulina Beckett PA-C

## 2018-12-20 NOTE — NURSING NOTE
"Initial /70   Pulse 98   SpO2 99%  Estimated body mass index is 20.53 kg/m  as calculated from the following:    Height as of 4/19/18: 1.651 m (5' 5\").    Weight as of 4/19/18: 56 kg (123 lb 6.4 oz). .    Lashanda Burns LPN    "

## 2018-12-20 NOTE — PATIENT INSTRUCTIONS
Wound Care Instructions     FOR SUPERFICIAL WOUNDS     [facility] [phone]    [facility] [phone]  Right upper arm, Right anterior neck and Right lateral neck                       AFTER 24 HOURS YOU SHOULD REMOVE THE BANDAGE AND BEGIN DAILY DRESSING CHANGES AS FOLLOWS:     1) Remove Dressing.     2) Clean and dry the area with tap water using a Q-tip or sterile gauze pad.     3) Apply Vaseline, Aquaphor, Polysporin ointment or Bacitracin ointment over entire wound.  Do NOT use Neosporin ointment.     4) Cover the wound with a band-aid, or a sterile non-stick gauze pad and micropore paper tape      REPEAT THESE INSTRUCTIONS AT LEAST ONCE A DAY UNTIL THE WOUND HAS COMPLETELY HEALED.    It is an old wives tale that a wound heals better when it is exposed to air and allowed to dry out. The wound will heal faster with a better cosmetic result if it is kept moist with ointment and covered with a bandage.    **Do not let the wound dry out.**      Supplies Needed:      *Cotton tipped applicators (Q-tips)    *Polysporin Ointment or Bacitracin Ointment (NOT NEOSPORIN)    *Band-aids or non-stick gauze pads and micropore paper tape.      PATIENT INFORMATION:    During the healing process you will notice a number of changes. All wounds develop a small halo of redness surrounding the wound.  This means healing is occurring. Severe itching with extensive redness usually indicates sensitivity to the ointment or bandage tape used to dress the wound.  You should call our office if this develops.      Swelling  and/or discoloration around your surgical site is common, particularly when performed around the eye.    All wounds normally drain.  The larger the wound the more drainage there will be.  After 7-10 days, you will notice the wound beginning to shrink and new skin will begin to grow.  The wound is healed when you can see skin has formed over the entire area.  A healed wound has a  healthy, shiny look to the surface and is red to dark pink in color to normalize.  Wounds may take approximately 4-6 weeks to heal.  Larger wounds may take 6-8 weeks.  After the wound is healed you may discontinue dressing changes.    You may experience a sensation of tightness as your wound heals. This is normal and will gradually subside.    Your healed wound may be sensitive to temperature changes. This sensitivity improves with time, but if you re having a lot of discomfort, try to avoid temperature extremes.    Patients frequently experience itching after their wound appears to have healed because of the continue healing under the skin.  Plain Vaseline will help relieve the itching.        POSSIBLE COMPLICATIONS    BLEEDIN. Leave the bandage in place.  2. Use tightly rolled up gauze or a cloth to apply direct pressure over the bandage for 30  minutes.  3. Reapply pressure for an additional 30 minutes if necessary  4. Use additional gauze and tape to maintain pressure once the bleeding has stopped.

## 2018-12-21 LAB — COPATH REPORT: NORMAL

## 2019-03-05 ENCOUNTER — OFFICE VISIT (OUTPATIENT)
Dept: FAMILY MEDICINE | Facility: CLINIC | Age: 39
End: 2019-03-05
Payer: COMMERCIAL

## 2019-03-05 VITALS
HEART RATE: 79 BPM | WEIGHT: 120.2 LBS | SYSTOLIC BLOOD PRESSURE: 102 MMHG | BODY MASS INDEX: 20.52 KG/M2 | RESPIRATION RATE: 12 BRPM | DIASTOLIC BLOOD PRESSURE: 62 MMHG | HEIGHT: 64 IN | TEMPERATURE: 96.3 F | OXYGEN SATURATION: 99 %

## 2019-03-05 DIAGNOSIS — K64.4 EXTERNAL HEMORRHOIDS: Primary | ICD-10-CM

## 2019-03-05 DIAGNOSIS — K64.8 INTERNAL HEMORRHOID: ICD-10-CM

## 2019-03-05 PROCEDURE — 99213 OFFICE O/P EST LOW 20 MIN: CPT | Performed by: NURSE PRACTITIONER

## 2019-03-05 RX ORDER — HYDROCORTISONE ACETATE 25 MG/1
25 SUPPOSITORY RECTAL 2 TIMES DAILY
Qty: 1 BOX | Refills: 1 | Status: SHIPPED | OUTPATIENT
Start: 2019-03-05 | End: 2019-09-03

## 2019-03-05 ASSESSMENT — MIFFLIN-ST. JEOR: SCORE: 1224.19

## 2019-03-05 NOTE — PATIENT INSTRUCTIONS
1. Avoid constipation. High fiber diet. May need to supplement with benefiber or metamucil.  2. May use colace 100 mg twice daily for stool softener.  3. Avoid prolonged sitting, heavy lifting, straining with bowel movements.  4. Sitz baths (sitting in small amount of warm water) 2-3 times per day for 15 minutes  5. Ice or cold pack to the rectal area for 10 minutes 3-4 times daily  6. Witch hazel pads (Tucks) up to 6 times per day.  7. Anusol ointment (over the counter) 3-4 times per day for 7 days.  8. Anusol suppositories - twice daily for 7 days        Thank you for choosing [facility].  You may be receiving a survey in the mail from Terrence Delgado regarding your visit today.  Please take a few minutes to complete and return the survey to let us know how we are doing.      If you have questions or concerns, please contact us via [email or web address]. or you can contact your care team at [phone].    Our Clinic hours are:  Monday 6:40 am  to 7:00 pm  Tuesday -Friday 6:40 am to 5:00 pm    The [place] outpatient lab hours are:  Monday - Friday 6:10 am to 4:45 pm  Saturdays 7:00 am to 11:00 am  Appointments are required, call [phone]    If you have clinical questions after hours or would like to schedule an appointment,  call the clinic at [phone].

## 2019-03-05 NOTE — PROGRESS NOTES
"  SUBJECTIVE:   Germania Hayden is a 38 year old female who presents to clinic today for the following health issues:      Hemorrhoids  Onset: 1 1/2 years, worsening    Description:   Pain: YES- mostly with bowel movement  Itching: YES    Accompanying Signs & Symptoms:  Blood streaked toilet paper: YES, very often  Blood in stool: no   Changes in stool pattern: no    History:   Any previous GI studies done:none    Family History of colon cancer: no, does have family history of crohns and pre cancerous polyps    Precipitating factors:   Pregnancy related, has been told she has prolapsed bladder    Alleviating factors:  None    Therapies Tried and outcome: hemorrhoid wipes - did not help, stool softner - temp use        Problem list and histories reviewed & adjusted, as indicated.  Additional history: as documented    Reviewed and updated as needed this visit by clinical staff       Reviewed and updated as needed this visit by Provider         ROS:  Constitutional, HEENT, cardiovascular, pulmonary, gi and gu systems are negative, except as otherwise noted.    OBJECTIVE:     /62 (BP Location: Right arm, Patient Position: Chair, Cuff Size: Adult Regular)   Pulse 79   Temp 96.3  F (35.7  C) (Tympanic)   Resp 12   Ht 1.632 m (5' 4.25\")   Wt 54.5 kg (120 lb 3.2 oz)   SpO2 99%   BMI 20.47 kg/m    Body mass index is 20.47 kg/m .  GENERAL: healthy, alert and no distress  RECTAL (female): normal sphincter tone, no rectal masses. One tiny flat external hemorrhoid. Anoscope exam: 2 internal hemorrhoids with signs of recent bleeding, surrounding erythema.      ASSESSMENT/PLAN:       ICD-10-CM    1. External hemorrhoids K64.4    2. Internal hemorrhoid K64.8 hydrocortisone (ANUSOL-HC) 25 MG suppository       Patient Instructions   1. Avoid constipation. High fiber diet. May need to supplement with benefiber or metamucil.  2. May use colace 100 mg twice daily for stool softener.  3. Avoid prolonged sitting, heavy " lifting, straining with bowel movements.  4. Sitz baths (sitting in small amount of warm water) 2-3 times per day for 15 minutes  5. Ice or cold pack to the rectal area for 10 minutes 3-4 times daily  6. Witch hazel pads (Tucks) up to 6 times per day.  7. Anusol ointment (over the counter) 3-4 times per day for 7 days.  8. Anusol suppositories - twice daily for 7 days        The risks, benefits and treatment options of prescribed medications or other treatments have been discussed with the patient. The patient verbalized their understanding and should call or follow up if no improvement or if they develop further problems.    [facility]

## 2019-03-29 ENCOUNTER — OFFICE VISIT (OUTPATIENT)
Dept: OBGYN | Facility: CLINIC | Age: 39
End: 2019-03-29
Payer: COMMERCIAL

## 2019-03-29 VITALS
HEART RATE: 85 BPM | WEIGHT: 120 LBS | DIASTOLIC BLOOD PRESSURE: 69 MMHG | HEIGHT: 64 IN | RESPIRATION RATE: 16 BRPM | SYSTOLIC BLOOD PRESSURE: 106 MMHG | BODY MASS INDEX: 20.49 KG/M2 | TEMPERATURE: 97.6 F

## 2019-03-29 DIAGNOSIS — N81.4 UTEROVAGINAL PROLAPSE: Primary | ICD-10-CM

## 2019-03-29 DIAGNOSIS — Z12.4 SCREENING FOR MALIGNANT NEOPLASM OF CERVIX: ICD-10-CM

## 2019-03-29 PROCEDURE — G0145 SCR C/V CYTO,THINLAYER,RESCR: HCPCS | Performed by: OBSTETRICS & GYNECOLOGY

## 2019-03-29 PROCEDURE — A4562 PESSARY, NON RUBBER,ANY TYPE: HCPCS | Performed by: OBSTETRICS & GYNECOLOGY

## 2019-03-29 PROCEDURE — 57160 INSERT PESSARY/OTHER DEVICE: CPT | Performed by: OBSTETRICS & GYNECOLOGY

## 2019-03-29 PROCEDURE — 99213 OFFICE O/P EST LOW 20 MIN: CPT | Mod: 25 | Performed by: OBSTETRICS & GYNECOLOGY

## 2019-03-29 PROCEDURE — 87624 HPV HI-RISK TYP POOLED RSLT: CPT | Performed by: OBSTETRICS & GYNECOLOGY

## 2019-03-29 ASSESSMENT — MIFFLIN-ST. JEOR: SCORE: 1223.29

## 2019-03-29 NOTE — PROGRESS NOTES
Germania is a 38 year old   female who presents for f/u of known uterovaginal prolapse; she tried a size 4 ring with support pessary last year but found it was slipping/falling out; she decided to live with her symptoms but now feels a lot of pressure, constipation, low ariadna pain; she is due for a Pap smear.    Patient Active Problem List    Diagnosis Date Noted     H/O cold sores 2018     Priority: Medium     Uterovaginal prolapse 10/27/2017     Priority: Medium     Trial of size 4 ring with support pessary 3/2018 due to 2-3+ cystocele, 2+ cervical decensus, 1+ rectocele with strain  Consider future TOTAL VAGINAL HYSTERECTOMY, A & P, SSVS       Vulvar varicose veins 2014     Priority: Medium       All systems were reviewed and pertinent information in noted in subjective/HPI.    Past Medical History:   Diagnosis Date     ASCUS favor benign 2011    neg HPV, repeat Pap + HPV cotesting in 3 years     Chickenpox      Depression      Eating disorder     bulemia- age 18 to 32     Uterovaginal prolapse 10/27/2017       Past Surgical History:   Procedure Laterality Date     ARTHROSCOPIC RECONSTRUCTION ANTERIOR CRUCIATE LIGAMENT  x3    twice on L knee and once on Rt knee     C BREAST AUGMENTATION       wisdom teeth           Current Outpatient Medications:      acyclovir (ZOVIRAX) 200 MG capsule, Take 1 capsule (200 mg) by mouth 2 times daily, Disp: 180 capsule, Rfl: 3     Multiple Vitamins-Minerals (MULTIVITAMIN WOMEN PO), , Disp: , Rfl:      hydrocortisone (ANUSOL-HC) 25 MG suppository, Place 1 suppository (25 mg) rectally 2 times daily (Patient not taking: Reported on 3/29/2019), Disp: 1 Box, Rfl: 1    ALLERGIES:  Patient has no known allergies.    Social History     Socioeconomic History     Marital status:      Spouse name: Eris     Number of children: 2     Years of education: None     Highest education level: None   Occupational History     Employer: [employer]   Social  "Needs     Financial resource strain: None     Food insecurity:     Worry: None     Inability: None     Transportation needs:     Medical: None     Non-medical: None   Tobacco Use     Smoking status: Never Smoker     Smokeless tobacco: Never Used   Substance and Sexual Activity     Alcohol use: Yes     Alcohol/week: 0.0 oz     Comment: Occassional- quit with pregnancy     Drug use: No     Sexual activity: Yes     Partners: Male   Lifestyle     Physical activity:     Days per week: None     Minutes per session: None     Stress: None   Relationships     Social connections:     Talks on phone: None     Gets together: None     Attends Scientologist service: None     Active member of club or organization: None     Attends meetings of clubs or organizations: None     Relationship status: None     Intimate partner violence:     Fear of current or ex partner: None     Emotionally abused: None     Physically abused: None     Forced sexual activity: None   Other Topics Concern     Parent/sibling w/ CABG, MI or angioplasty before 65F 55M? No   Social History Narrative     None       Family History   Problem Relation Age of Onset     Cancer Paternal Grandmother 60        ovarian     Breast Cancer Paternal Grandmother         stage 4     Cancer Maternal Grandmother         lymphoma     Cancer Maternal Grandfather         lung ,bone     Cardiovascular Paternal Grandfather         MI     Colon Cancer Mother      Gastrointestinal Disease Brother         ulcerative colitis     Melanoma No family hx of        OBJECTIVE:  Vitals: /69 (BP Location: Right arm, Patient Position: Chair, Cuff Size: Adult Small)   Pulse 85   Temp 97.6  F (36.4  C) (Tympanic)   Resp 16   Ht 1.632 m (5' 4.25\")   Wt 54.4 kg (120 lb)   LMP 03/23/2019   BMI 20.44 kg/m   BMI= Body mass index is 20.44 kg/m .   Patient's last menstrual period was 03/23/2019.     GENERAL APPEARANCE: healthy, alert and no distress  ABDOMEN:  soft, nontender, no " hepato-splenomegaly or hernias  PELVIC:  Gaping perineum; 2-3+ cystocele, 2+ uterine prolapse, 1-2+ rectocele; well estrogniezed  Pap taken  Uterus AV, NT; no masses    Fit with size 6 ring with support pessary that was well tolerated; instructions reviewed for self care    ASSESSMENT:      ICD-10-CM    1. Uterovaginal prolapse N81.4 MEASURE POST-VOID RESIDUAL URINE/BLADDER CAPACITY, US NON-IMAGING   2. Screening for malignant neoplasm of cervix Z12.4 Pap imaged thin layer screen with HPV - recommended age 30 - 65 years (select HPV order below)     HPV High Risk Types DNA Cervical       PLAN:  Recommend daily removal and AM replacement of pessary  F/u prn    Tyler Wilkerson MD    Duration of visit:  25 minutes, >50% in discussion of current issues, treatment options and treatment planning.  TYLER WILKERSON MD

## 2019-04-02 LAB
COPATH REPORT: NORMAL
PAP: NORMAL

## 2019-04-03 LAB
FINAL DIAGNOSIS: NORMAL
HPV HR 12 DNA CVX QL NAA+PROBE: NEGATIVE
HPV16 DNA SPEC QL NAA+PROBE: NEGATIVE
HPV18 DNA SPEC QL NAA+PROBE: NEGATIVE
SPECIMEN DESCRIPTION: NORMAL
SPECIMEN SOURCE CVX/VAG CYTO: NORMAL

## 2019-05-09 ENCOUNTER — OFFICE VISIT (OUTPATIENT)
Dept: DERMATOLOGY | Facility: CLINIC | Age: 39
End: 2019-05-09
Payer: COMMERCIAL

## 2019-05-09 VITALS — DIASTOLIC BLOOD PRESSURE: 76 MMHG | HEART RATE: 87 BPM | OXYGEN SATURATION: 100 % | SYSTOLIC BLOOD PRESSURE: 101 MMHG

## 2019-05-09 DIAGNOSIS — D23.9 DERMATOFIBROMA: ICD-10-CM

## 2019-05-09 DIAGNOSIS — L81.4 LENTIGO: Primary | ICD-10-CM

## 2019-05-09 PROCEDURE — 99213 OFFICE O/P EST LOW 20 MIN: CPT | Performed by: PHYSICIAN ASSISTANT

## 2019-05-09 NOTE — NURSING NOTE
"Initial /76   Pulse 87   SpO2 100%  Estimated body mass index is 20.44 kg/m  as calculated from the following:    Height as of 3/29/19: 1.632 m (5' 4.25\").    Weight as of 3/29/19: 54.4 kg (120 lb). .    Lashanda Burns LPN    "

## 2019-05-09 NOTE — LETTER
5/9/2019         RE: Germania Hayden  [address]  [place] [address]        Dear Colleague,    Thank you for referring your patient, Germania Hayden, to the [facility]. Please see a copy of my visit note below.    Germania Hayden is a 39 year old year old female patient here today for spot on back near bra. Present for about 3 months. Rubs and gets irritated. She notes that brown macule is return on anterior neck biopsied LOV came back as lentigo. Patient has no other skin complaints today.  Remainder of the HPI, Meds, PMH, Allergies, FH, and SH was reviewed in chart.    Past Medical History:   Diagnosis Date     ASCUS favor benign 12/2011    neg HPV, repeat Pap + HPV cotesting in 3 years     Chickenpox      Depression      Eating disorder     bulemia- age 18 to 32     Uterovaginal prolapse 10/27/2017       Past Surgical History:   Procedure Laterality Date     ARTHROSCOPIC RECONSTRUCTION ANTERIOR CRUCIATE LIGAMENT  x3    twice on L knee and once on Rt knee     C BREAST AUGMENTATION       wisdom teeth          Family History   Problem Relation Age of Onset     Cancer Paternal Grandmother 60        ovarian     Breast Cancer Paternal Grandmother         stage 4     Cancer Maternal Grandmother         lymphoma     Cancer Maternal Grandfather         lung ,bone     Cardiovascular Paternal Grandfather         MI     Colon Cancer Mother      Gastrointestinal Disease Brother         ulcerative colitis     Melanoma No family hx of        Social History     Socioeconomic History     Marital status:      Spouse name: Eris     Number of children: 2     Years of education: Not on file     Highest education level: Not on file   Occupational History     Employer: [employer]   Social Needs     Financial resource strain: Not on file     Food insecurity:     Worry: Not on file     Inability: Not on file     Transportation needs:     Medical: Not on file     Non-medical:  Not on file   Tobacco Use     Smoking status: Never Smoker     Smokeless tobacco: Never Used   Substance and Sexual Activity     Alcohol use: Yes     Alcohol/week: 0.0 oz     Comment: Occassional- quit with pregnancy     Drug use: No     Sexual activity: Yes     Partners: Male   Lifestyle     Physical activity:     Days per week: Not on file     Minutes per session: Not on file     Stress: Not on file   Relationships     Social connections:     Talks on phone: Not on file     Gets together: Not on file     Attends Druze service: Not on file     Active member of club or organization: Not on file     Attends meetings of clubs or organizations: Not on file     Relationship status: Not on file     Intimate partner violence:     Fear of current or ex partner: Not on file     Emotionally abused: Not on file     Physically abused: Not on file     Forced sexual activity: Not on file   Other Topics Concern     Parent/sibling w/ CABG, MI or angioplasty before 65F 55M? No   Social History Narrative     Not on file       Outpatient Encounter Medications as of 5/9/2019   Medication Sig Dispense Refill     acyclovir (ZOVIRAX) 200 MG capsule Take 1 capsule (200 mg) by mouth 2 times daily 180 capsule 3     Multiple Vitamins-Minerals (MULTIVITAMIN WOMEN PO)        hydrocortisone (ANUSOL-HC) 25 MG suppository Place 1 suppository (25 mg) rectally 2 times daily (Patient not taking: Reported on 3/29/2019) 1 Box 1     No facility-administered encounter medications on file as of 5/9/2019.              Review Of Systems  Skin: As above  Eyes: negative  Ears/Nose/Throat: negative  Respiratory: No shortness of breath, dyspnea on exertion, cough, or hemoptysis  Cardiovascular: negative  Gastrointestinal: negative  Genitourinary: negative  Musculoskeletal: negative  Neurologic: negative  Psychiatric: negative  Hematologic/Lymphatic/Immunologic: negative  Endocrine: negative      O:   NAD, WDWN, Alert & Oriented, Mood & Affect wnl, Vitals  stable   Here today alone   /76   Pulse 87   SpO2 100%    General appearance normal   Vitals stable   Alert, oriented and in no acute distress     Light brown colored macule on right anterior neck, consistent with lentigo  Firm brown papule on right lateral back near axilla, just superior to braline      Eyes: Conjunctivae/lids:Normal     ENT: Lips: normal    MSK:Normal    Pulm: Breathing Normal    Neuro/Psych: Orientation:Normal; Mood/Affect:Normal  A/P:  1. Lentigo on right anterior neck  LN2:  Treated with LN2 for 5s for 1-2 cycles. Warned risks of blistering, pain, pigment change, scarring, and incomplete resolution.  Advised patient to return if lesions do not completely resolve.  Wound care sheet given.  2. Dermatofibroma  Will schedule for excision with Dr. Etienne.       Again, thank you for allowing me to participate in the care of your patient.        Sincerely,        Paulina Beckett PA-C

## 2019-05-09 NOTE — PROGRESS NOTES
Germania Hayden is a 39 year old year old female patient here today for spot on back near bra. Present for about 3 months. Rubs and gets irritated. She notes that brown macule is return on anterior neck biopsied LOV came back as lentigo. Patient has no other skin complaints today.  Remainder of the HPI, Meds, PMH, Allergies, FH, and SH was reviewed in chart.    Past Medical History:   Diagnosis Date     ASCUS favor benign 12/2011    neg HPV, repeat Pap + HPV cotesting in 3 years     Chickenpox      Depression      Eating disorder     bulemia- age 18 to 32     Uterovaginal prolapse 10/27/2017       Past Surgical History:   Procedure Laterality Date     ARTHROSCOPIC RECONSTRUCTION ANTERIOR CRUCIATE LIGAMENT  x3    twice on L knee and once on Rt knee     C BREAST AUGMENTATION       wisdom teeth          Family History   Problem Relation Age of Onset     Cancer Paternal Grandmother 60        ovarian     Breast Cancer Paternal Grandmother         stage 4     Cancer Maternal Grandmother         lymphoma     Cancer Maternal Grandfather         lung ,bone     Cardiovascular Paternal Grandfather         MI     Colon Cancer Mother      Gastrointestinal Disease Brother         ulcerative colitis     Melanoma No family hx of        Social History     Socioeconomic History     Marital status:      Spouse name: Eris     Number of children: 2     Years of education: Not on file     Highest education level: Not on file   Occupational History     Employer: [employer]   Social Needs     Financial resource strain: Not on file     Food insecurity:     Worry: Not on file     Inability: Not on file     Transportation needs:     Medical: Not on file     Non-medical: Not on file   Tobacco Use     Smoking status: Never Smoker     Smokeless tobacco: Never Used   Substance and Sexual Activity     Alcohol use: Yes     Alcohol/week: 0.0 oz     Comment: Occassional- quit with pregnancy     Drug use: No     Sexual  activity: Yes     Partners: Male   Lifestyle     Physical activity:     Days per week: Not on file     Minutes per session: Not on file     Stress: Not on file   Relationships     Social connections:     Talks on phone: Not on file     Gets together: Not on file     Attends Hindu service: Not on file     Active member of club or organization: Not on file     Attends meetings of clubs or organizations: Not on file     Relationship status: Not on file     Intimate partner violence:     Fear of current or ex partner: Not on file     Emotionally abused: Not on file     Physically abused: Not on file     Forced sexual activity: Not on file   Other Topics Concern     Parent/sibling w/ CABG, MI or angioplasty before 65F 55M? No   Social History Narrative     Not on file       Outpatient Encounter Medications as of 5/9/2019   Medication Sig Dispense Refill     acyclovir (ZOVIRAX) 200 MG capsule Take 1 capsule (200 mg) by mouth 2 times daily 180 capsule 3     Multiple Vitamins-Minerals (MULTIVITAMIN WOMEN PO)        hydrocortisone (ANUSOL-HC) 25 MG suppository Place 1 suppository (25 mg) rectally 2 times daily (Patient not taking: Reported on 3/29/2019) 1 Box 1     No facility-administered encounter medications on file as of 5/9/2019.              Review Of Systems  Skin: As above  Eyes: negative  Ears/Nose/Throat: negative  Respiratory: No shortness of breath, dyspnea on exertion, cough, or hemoptysis  Cardiovascular: negative  Gastrointestinal: negative  Genitourinary: negative  Musculoskeletal: negative  Neurologic: negative  Psychiatric: negative  Hematologic/Lymphatic/Immunologic: negative  Endocrine: negative      O:   NAD, WDWN, Alert & Oriented, Mood & Affect wnl, Vitals stable   Here today alone   /76   Pulse 87   SpO2 100%    General appearance normal   Vitals stable   Alert, oriented and in no acute distress     Light brown colored macule on right anterior neck, consistent with lentigo  Firm brown  papule on right lateral back near axilla, just superior to braline      Eyes: Conjunctivae/lids:Normal     ENT: Lips: normal    MSK:Normal    Pulm: Breathing Normal    Neuro/Psych: Orientation:Normal; Mood/Affect:Normal  A/P:  1. Lentigo on right anterior neck  LN2:  Treated with LN2 for 5s for 1-2 cycles. Warned risks of blistering, pain, pigment change, scarring, and incomplete resolution.  Advised patient to return if lesions do not completely resolve.  Wound care sheet given.  2. Dermatofibroma  Will schedule for excision with Dr. Etienne.

## 2019-06-05 ENCOUNTER — MYC MEDICAL ADVICE (OUTPATIENT)
Dept: OBGYN | Facility: CLINIC | Age: 39
End: 2019-06-05

## 2019-06-11 NOTE — TELEPHONE ENCOUNTER
Procedure  reached out to schedule diagnostic colonoscopy.    Patient states that she is going to get a second opinion and if she decides to proceed she will call and have the orders faxed to [facility].

## 2019-06-17 ENCOUNTER — OFFICE VISIT (OUTPATIENT)
Dept: DERMATOLOGY | Facility: CLINIC | Age: 39
End: 2019-06-17
Payer: COMMERCIAL

## 2019-06-17 VITALS — SYSTOLIC BLOOD PRESSURE: 98 MMHG | OXYGEN SATURATION: 99 % | HEART RATE: 95 BPM | DIASTOLIC BLOOD PRESSURE: 71 MMHG

## 2019-06-17 DIAGNOSIS — D23.9 DERMATOFIBROMA: Primary | ICD-10-CM

## 2019-06-17 PROCEDURE — 11403 EXC TR-EXT B9+MARG 2.1-3CM: CPT | Mod: 51 | Performed by: DERMATOLOGY

## 2019-06-17 PROCEDURE — 13101 CMPLX RPR TRUNK 2.6-7.5 CM: CPT | Performed by: DERMATOLOGY

## 2019-06-17 PROCEDURE — 88342 IMHCHEM/IMCYTCHM 1ST ANTB: CPT | Mod: TC | Performed by: DERMATOLOGY

## 2019-06-17 PROCEDURE — 88305 TISSUE EXAM BY PATHOLOGIST: CPT | Mod: TC | Performed by: DERMATOLOGY

## 2019-06-17 NOTE — NURSING NOTE
"Chief Complaint   Patient presents with     Derm Problem     excision of dermatofibroma       Initial BP 98/71 (BP Location: Left arm, Patient Position: Chair, Cuff Size: Adult Regular)   Pulse 95   SpO2 99%  Estimated body mass index is 20.44 kg/m  as calculated from the following:    Height as of 3/29/19: 1.632 m (5' 4.25\").    Weight as of 3/29/19: 54.4 kg (120 lb).  Medications and allergies reviewed.    Jigar SIMMONS CMA (AAMA)    "

## 2019-06-17 NOTE — PROGRESS NOTES
Germania Hayden is a 39 year old year old female patient here today for ewm of tender df on right flank.  Patient reports the following modifying factors none.  Associated symptoms: none.  Patient has no other skin complaints today.  Remainder of the HPI, Meds, PMH, Allergies, FH, and SH was reviewed in chart.      Past Medical History:   Diagnosis Date     ASCUS favor benign 12/2011    neg HPV, repeat Pap + HPV cotesting in 3 years     Chickenpox      Depression      Eating disorder     bulemia- age 18 to 32     Uterovaginal prolapse 10/27/2017       Past Surgical History:   Procedure Laterality Date     ARTHROSCOPIC RECONSTRUCTION ANTERIOR CRUCIATE LIGAMENT  x3    twice on L knee and once on Rt knee     C BREAST AUGMENTATION       wisdom teeth          Family History   Problem Relation Age of Onset     Cancer Paternal Grandmother 60        ovarian     Breast Cancer Paternal Grandmother         stage 4     Cancer Maternal Grandmother         lymphoma     Cancer Maternal Grandfather         lung ,bone     Cardiovascular Paternal Grandfather         MI     Colon Cancer Mother      Gastrointestinal Disease Brother         ulcerative colitis     Melanoma No family hx of        Social History     Socioeconomic History     Marital status:      Spouse name: Eris     Number of children: 2     Years of education: Not on file     Highest education level: Not on file   Occupational History     Employer: [employer]   Social Needs     Financial resource strain: Not on file     Food insecurity:     Worry: Not on file     Inability: Not on file     Transportation needs:     Medical: Not on file     Non-medical: Not on file   Tobacco Use     Smoking status: Never Smoker     Smokeless tobacco: Never Used   Substance and Sexual Activity     Alcohol use: Yes     Alcohol/week: 0.0 oz     Comment: Occassional- quit with pregnancy     Drug use: No     Sexual activity: Yes     Partners: Male   Lifestyle      Physical activity:     Days per week: Not on file     Minutes per session: Not on file     Stress: Not on file   Relationships     Social connections:     Talks on phone: Not on file     Gets together: Not on file     Attends Anglican service: Not on file     Active member of club or organization: Not on file     Attends meetings of clubs or organizations: Not on file     Relationship status: Not on file     Intimate partner violence:     Fear of current or ex partner: Not on file     Emotionally abused: Not on file     Physically abused: Not on file     Forced sexual activity: Not on file   Other Topics Concern     Parent/sibling w/ CABG, MI or angioplasty before 65F 55M? No   Social History Narrative     Not on file       Outpatient Encounter Medications as of 6/17/2019   Medication Sig Dispense Refill     acyclovir (ZOVIRAX) 200 MG capsule Take 1 capsule (200 mg) by mouth 2 times daily 180 capsule 3     Multiple Vitamins-Minerals (MULTIVITAMIN WOMEN PO)        hydrocortisone (ANUSOL-HC) 25 MG suppository Place 1 suppository (25 mg) rectally 2 times daily (Patient not taking: Reported on 3/29/2019) 1 Box 1     No facility-administered encounter medications on file as of 6/17/2019.              Review Of Systems  Skin: As above  Eyes: negative  Ears/Nose/Throat: negative  Respiratory: No shortness of breath, dyspnea on exertion, cough, or hemoptysis  Cardiovascular: negative  Gastrointestinal: negative  Genitourinary: negative  Musculoskeletal: negative  Neurologic: negative  Psychiatric: negative  Hematologic/Lymphatic/Immunologic: negative  Endocrine: negative      O:   NAD, WDWN, Alert & Oriented, Mood & Affect wnl, Vitals stable   Here today alone   BP 98/71 (BP Location: Left arm, Patient Position: Chair, Cuff Size: Adult Regular)   Pulse 95   SpO2 99%    General appearance normal   Vitals stable   Alert, oriented and in no acute distress     R flank brown positive button hol sign 2.2cm nodule      Eyes:  Conjunctivae/lids:Normal     ENT: Lips, buccal mucosa, tongue: normal    MSK:Normal    Cardiovascular: peripheral edema none    Pulm: Breathing Normal    Neuro/Psych: Orientation:Normal; Mood/Affect:Normal      A/P:  1. R flank DF 2.2cm   EXCISION OF BENIGN LESION AND COMPLEX: After thorough discussion of PGACA, consent obtained, anesthesia and prep, the margins of the lesion were identified and an elliptical incision was made encompassing the lesion. The incisions were made through the skin and down to and including the subcutaneous tissue. The lesion was removed en bloc and submitted for perms pathologic review. The wound edges were widely undermined until adequate tissue mobility was obtained. hemostasis was achieved. The wound edges were then closed in a layered fashion, being careful not to leave any dead space. Postoperative length was 4.1 cm.   EBL minimal; complications none; wound care routine. The patient was discharged in good condition and will return in one week for wound evaluation.

## 2019-06-17 NOTE — LETTER
6/17/2019         RE: Germania Hayden  [address]  [place] [address]        Dear Colleague,    Thank you for referring your patient, Germania Hayden, to the [facility]. Please see a copy of my visit note below.    Germania Hayden is a 39 year old year old female patient here today for ewm of tender df on right flank.  Patient reports the following modifying factors none.  Associated symptoms: none.  Patient has no other skin complaints today.  Remainder of the HPI, Meds, PMH, Allergies, FH, and SH was reviewed in chart.      Past Medical History:   Diagnosis Date     ASCUS favor benign 12/2011    neg HPV, repeat Pap + HPV cotesting in 3 years     Chickenpox      Depression      Eating disorder     bulemia- age 18 to 32     Uterovaginal prolapse 10/27/2017       Past Surgical History:   Procedure Laterality Date     ARTHROSCOPIC RECONSTRUCTION ANTERIOR CRUCIATE LIGAMENT  x3    twice on L knee and once on Rt knee     C BREAST AUGMENTATION       wisdom teeth          Family History   Problem Relation Age of Onset     Cancer Paternal Grandmother 60        ovarian     Breast Cancer Paternal Grandmother         stage 4     Cancer Maternal Grandmother         lymphoma     Cancer Maternal Grandfather         lung ,bone     Cardiovascular Paternal Grandfather         MI     Colon Cancer Mother      Gastrointestinal Disease Brother         ulcerative colitis     Melanoma No family hx of        Social History     Socioeconomic History     Marital status:      Spouse name: Eris     Number of children: 2     Years of education: Not on file     Highest education level: Not on file   Occupational History     Employer: [employer]   Social Needs     Financial resource strain: Not on file     Food insecurity:     Worry: Not on file     Inability: Not on file     Transportation needs:     Medical: Not on file     Non-medical: Not on file   Tobacco Use     Smoking status:  Never Smoker     Smokeless tobacco: Never Used   Substance and Sexual Activity     Alcohol use: Yes     Alcohol/week: 0.0 oz     Comment: Occassional- quit with pregnancy     Drug use: No     Sexual activity: Yes     Partners: Male   Lifestyle     Physical activity:     Days per week: Not on file     Minutes per session: Not on file     Stress: Not on file   Relationships     Social connections:     Talks on phone: Not on file     Gets together: Not on file     Attends Restorationism service: Not on file     Active member of club or organization: Not on file     Attends meetings of clubs or organizations: Not on file     Relationship status: Not on file     Intimate partner violence:     Fear of current or ex partner: Not on file     Emotionally abused: Not on file     Physically abused: Not on file     Forced sexual activity: Not on file   Other Topics Concern     Parent/sibling w/ CABG, MI or angioplasty before 65F 55M? No   Social History Narrative     Not on file       Outpatient Encounter Medications as of 6/17/2019   Medication Sig Dispense Refill     acyclovir (ZOVIRAX) 200 MG capsule Take 1 capsule (200 mg) by mouth 2 times daily 180 capsule 3     Multiple Vitamins-Minerals (MULTIVITAMIN WOMEN PO)        hydrocortisone (ANUSOL-HC) 25 MG suppository Place 1 suppository (25 mg) rectally 2 times daily (Patient not taking: Reported on 3/29/2019) 1 Box 1     No facility-administered encounter medications on file as of 6/17/2019.              Review Of Systems  Skin: As above  Eyes: negative  Ears/Nose/Throat: negative  Respiratory: No shortness of breath, dyspnea on exertion, cough, or hemoptysis  Cardiovascular: negative  Gastrointestinal: negative  Genitourinary: negative  Musculoskeletal: negative  Neurologic: negative  Psychiatric: negative  Hematologic/Lymphatic/Immunologic: negative  Endocrine: negative      O:   NAD, WDWN, Alert & Oriented, Mood & Affect wnl, Vitals stable   Here today alone   BP 98/71 (BP  Location: Left arm, Patient Position: Chair, Cuff Size: Adult Regular)   Pulse 95   SpO2 99%    General appearance normal   Vitals stable   Alert, oriented and in no acute distress     R flank brown positive button hol sign 2.2cm nodule      Eyes: Conjunctivae/lids:Normal     ENT: Lips, buccal mucosa, tongue: normal    MSK:Normal    Cardiovascular: peripheral edema none    Pulm: Breathing Normal    Neuro/Psych: Orientation:Normal; Mood/Affect:Normal      A/P:  1. R flank DF 2.2cm   EXCISION OF BENIGN LESION AND COMPLEX: After thorough discussion of PGACAC, consent obtained, anesthesia and prep, the margins of the lesion were identified and an elliptical incision was made encompassing the lesion. The incisions were made through the skin and down to and including the subcutaneous tissue. The lesion was removed en bloc and submitted for perms pathologic review. The wound edges were widely undermined until adequate tissue mobility was obtained. hemostasis was achieved. The wound edges were then closed in a layered fashion, being careful not to leave any dead space. Postoperative length was 4.1 cm.   EBL minimal; complications none; wound care routine. The patient was discharged in good condition and will return in one week for wound evaluation.      Again, thank you for allowing me to participate in the care of your patient.        Sincerely,        Eris Etienne MD

## 2019-06-17 NOTE — PATIENT INSTRUCTIONS
Sutured Wound Care     [facility]: [phone]    [facility]: [phone]    Right Flank      ? No strenuous activity for 48 hours. Resume moderate activity in 48 hours. No heavy exercising until you are seen for follow up in one week.     ? Take Tylenol as needed for discomfort.                         ? Do not drink alcoholic beverages for 48 hours.     ? Keep the pressure bandage in place for 24 hours. If the bandage becomes blood tinged or loose, reinforce it with gauze and tape.        (Refer to the reverse side of this page for management of bleeding).    ? Remove pressure bandage in 24 hours     ? Leave the flat bandage in place until your follow up appointment.    ? Keep the bandage dry. Wash around it carefully.    ? If the tape becomes soiled or starts to come off, reinforce it with additional paper tape.    ? Do not smoke for 3 weeks; smoking is detrimental to wound healing.    ? It is normal to have swelling and bruising around the surgical site. The bruising will fade in approximately 10-14 days. Elevate the area to reduce swelling.    ? Numbness, itchiness and sensitivity to temperature changes can occur after surgery and may take up to 18 months to normalize.      POSSIBLE COMPLICATIONS    BLEEDIN. Leave the bandage in place.  2. Use tightly rolled up gauze or a cloth to apply direct pressure over the bandage for 20   minutes.  3. Reapply pressure for an additional 20 minutes if necessary  4. Call the office or go to the nearest emergency room if pressure fails to stop the bleeding.  5. Use additional gauze and tape to maintain pressure once the bleeding has stopped.        PAIN:    1. Post operative pain should slowly get better, never worse.  2. A severe increase in pain may indicate a problem. Call the office if this occurs.    In case of emergency phone:Dr Etienne [phone]      WOUND CARE INSTRUCTIONS  for  ONE WEEK AFTER SURGERY             Right  Flank      1) Leave flat bandage on your skin for one week after today s bandage change.  2) In one week when you remove the bandage, you may resume your regular skin care routine, including washing with mild soap and water, applying moisturizer, make-up and sunscreen.    3) If there are any open or bleeding areas at the incision/graft site you should begin to cover the area with a bandage daily as follows:    1) Clean and dry the area with plain tap water using a Q-tip or sterile gauze pad.  2) Apply Polysporin or Bacitracin ointment to the open area.  3) Cover the wound with a band-aid or a sterile non-stick gauze pad and micropore paper tape.         SIGNS OF INFECTION  - If you notice any of these signs of infection, call your doctor right away: expanding redness around the wound.  - Yellow or greenish-colored pus or cloudy wound drainage.    - Red streaking spreading from the wound.  - Increased swelling, tenderness, or pain around the wound.   - Fever.    Please remember that yellow and clear drainage from a wound can be normal and related to normal wound healing.  Isolated drainage from a wound without a combination of the above features does not indicate infection.       *Once the bandages are removed, the scar will be red and firm (especially in the lip/chin area). This is normal and will fade in time. It might take 6-12 months for this to happen.     *Massaging the area will help the scar soften and fade quicker. Begin to massage the area one month after the bandages have been removed. To massage apply pressure directly and firmly over the scar with the fingertips and move in a circular motion. Massage the area for a few minutes several times a day. Continue to massage the site for several months.    *Approximately 6-8 weeks after surgery it is not uncommon to see the formation of  tender pimple-like  bump along the scar. This is normal. As the scar continues to mature and the stitches underneath the skin  begin to dissolve, this might occur. Do not pick or squeeze, this will resolve on it s own. Should one break open producing a small amount of drainage, apply Polysporin or Bacitracin ointment a few times a day until the wound is completely healed.    *Numbness in the surgical area is expected. It might take 12-18 months for the feeling to return to normal. During this time sensations of itchiness, tingling and occasional sharp pains might be noted. These feelings are normal and will subside once the nerves have completely healed.         IN CASE OF EMERGENCY: Dr Etienne [phone]     If you were seen in [place] call: [phone]    If you were seen in [place] call: [phone]

## 2019-06-24 LAB — COPATH REPORT: NORMAL

## 2019-08-02 DIAGNOSIS — Z86.19 HISTORY OF COLD SORES: ICD-10-CM

## 2019-08-02 RX ORDER — ACYCLOVIR 200 MG/1
200 CAPSULE ORAL 2 TIMES DAILY
Qty: 180 CAPSULE | Refills: 3 | Status: SHIPPED | OUTPATIENT
Start: 2019-08-02 | End: 2020-08-06

## 2019-08-02 NOTE — TELEPHONE ENCOUNTER
Last office visit 3/29/19.  Request for refill on Zovirax.  Requested labs for RN refill are not up to date.    Please review and advise on refill.    Thank you.    Gemma Miller   [facility]  RN

## 2019-08-19 ENCOUNTER — HOSPITAL ENCOUNTER (EMERGENCY)
Facility: CLINIC | Age: 39
Discharge: HOME | End: 2019-08-19
Attending: EMERGENCY MEDICINE | Admitting: EMERGENCY MEDICINE
Payer: COMMERCIAL

## 2019-08-19 ENCOUNTER — APPOINTMENT (OUTPATIENT)
Dept: GENERAL RADIOLOGY | Facility: CLINIC | Age: 39
End: 2019-08-19
Attending: EMERGENCY MEDICINE
Payer: COMMERCIAL

## 2019-08-19 ENCOUNTER — NURSE TRIAGE (OUTPATIENT)
Dept: FAMILY MEDICINE | Facility: CLINIC | Age: 39
End: 2019-08-19

## 2019-08-19 VITALS
HEIGHT: 65 IN | HEART RATE: 91 BPM | RESPIRATION RATE: 14 BRPM | OXYGEN SATURATION: 99 % | WEIGHT: 120 LBS | TEMPERATURE: 98.4 F | SYSTOLIC BLOOD PRESSURE: 104 MMHG | DIASTOLIC BLOOD PRESSURE: 72 MMHG | BODY MASS INDEX: 19.99 KG/M2

## 2019-08-19 DIAGNOSIS — R10.11 ABDOMINAL PAIN, RIGHT UPPER QUADRANT: ICD-10-CM

## 2019-08-19 LAB
ALBUMIN SERPL-MCNC: 4.2 G/DL (ref 3.4–5)
ALBUMIN UR-MCNC: NEGATIVE MG/DL
ALP SERPL-CCNC: 42 U/L (ref 40–150)
ALT SERPL W P-5'-P-CCNC: 21 U/L (ref 0–50)
ANION GAP SERPL CALCULATED.3IONS-SCNC: 5 MMOL/L (ref 3–14)
APPEARANCE UR: CLEAR
AST SERPL W P-5'-P-CCNC: 12 U/L (ref 0–45)
BASOPHILS # BLD AUTO: 0 10E9/L (ref 0–0.2)
BASOPHILS NFR BLD AUTO: 0.5 %
BILIRUB SERPL-MCNC: 0.6 MG/DL (ref 0.2–1.3)
BILIRUB UR QL STRIP: NEGATIVE
BUN SERPL-MCNC: 10 MG/DL (ref 7–30)
CALCIUM SERPL-MCNC: 9.1 MG/DL (ref 8.5–10.1)
CHLORIDE SERPL-SCNC: 109 MMOL/L (ref 94–109)
CO2 SERPL-SCNC: 27 MMOL/L (ref 20–32)
COLOR UR AUTO: ABNORMAL
CREAT SERPL-MCNC: 0.6 MG/DL (ref 0.52–1.04)
D DIMER PPP FEU-MCNC: <0.3 UG/ML FEU (ref 0–0.5)
DIFFERENTIAL METHOD BLD: ABNORMAL
EOSINOPHIL # BLD AUTO: 0 10E9/L (ref 0–0.7)
EOSINOPHIL NFR BLD AUTO: 0.7 %
ERYTHROCYTE [DISTWIDTH] IN BLOOD BY AUTOMATED COUNT: 12.8 % (ref 10–15)
GFR SERPL CREATININE-BSD FRML MDRD: >90 ML/MIN/{1.73_M2}
GLUCOSE SERPL-MCNC: 112 MG/DL (ref 70–99)
GLUCOSE UR STRIP-MCNC: NEGATIVE MG/DL
HCG SERPL QL: NEGATIVE
HCT VFR BLD AUTO: 42.5 % (ref 35–47)
HGB BLD-MCNC: 13.3 G/DL (ref 11.7–15.7)
HGB UR QL STRIP: NEGATIVE
IMM GRANULOCYTES # BLD: 0 10E9/L (ref 0–0.4)
IMM GRANULOCYTES NFR BLD: 0.2 %
KETONES UR STRIP-MCNC: 5 MG/DL
LEUKOCYTE ESTERASE UR QL STRIP: NEGATIVE
LIPASE SERPL-CCNC: 102 U/L (ref 73–393)
LYMPHOCYTES # BLD AUTO: 1.6 10E9/L (ref 0.8–5.3)
LYMPHOCYTES NFR BLD AUTO: 28.6 %
MCH RBC QN AUTO: 29.2 PG (ref 26.5–33)
MCHC RBC AUTO-ENTMCNC: 31.3 G/DL (ref 31.5–36.5)
MCV RBC AUTO: 93 FL (ref 78–100)
MONOCYTES # BLD AUTO: 0.3 10E9/L (ref 0–1.3)
MONOCYTES NFR BLD AUTO: 5.6 %
NEUTROPHILS # BLD AUTO: 3.5 10E9/L (ref 1.6–8.3)
NEUTROPHILS NFR BLD AUTO: 64.4 %
NITRATE UR QL: NEGATIVE
NRBC # BLD AUTO: 0 10*3/UL
NRBC BLD AUTO-RTO: 0 /100
PH UR STRIP: 7 PH (ref 5–7)
PLATELET # BLD AUTO: 248 10E9/L (ref 150–450)
POTASSIUM SERPL-SCNC: 3.7 MMOL/L (ref 3.4–5.3)
PROT SERPL-MCNC: 7.9 G/DL (ref 6.8–8.8)
RBC # BLD AUTO: 4.56 10E12/L (ref 3.8–5.2)
RBC #/AREA URNS AUTO: 0 /HPF (ref 0–2)
SODIUM SERPL-SCNC: 141 MMOL/L (ref 133–144)
SOURCE: ABNORMAL
SP GR UR STRIP: 1.01 (ref 1–1.03)
SQUAMOUS #/AREA URNS AUTO: <1 /HPF (ref 0–1)
UROBILINOGEN UR STRIP-MCNC: 0 MG/DL (ref 0–2)
WBC # BLD AUTO: 5.5 10E9/L (ref 4–11)
WBC #/AREA URNS AUTO: <1 /HPF (ref 0–5)

## 2019-08-19 PROCEDURE — 81001 URINALYSIS AUTO W/SCOPE: CPT | Performed by: EMERGENCY MEDICINE

## 2019-08-19 PROCEDURE — 96360 HYDRATION IV INFUSION INIT: CPT | Performed by: EMERGENCY MEDICINE

## 2019-08-19 PROCEDURE — 85025 COMPLETE CBC W/AUTO DIFF WBC: CPT | Performed by: EMERGENCY MEDICINE

## 2019-08-19 PROCEDURE — 96361 HYDRATE IV INFUSION ADD-ON: CPT | Performed by: EMERGENCY MEDICINE

## 2019-08-19 PROCEDURE — 84703 CHORIONIC GONADOTROPIN ASSAY: CPT | Performed by: EMERGENCY MEDICINE

## 2019-08-19 PROCEDURE — 99284 EMERGENCY DEPT VISIT MOD MDM: CPT | Mod: 25 | Performed by: EMERGENCY MEDICINE

## 2019-08-19 PROCEDURE — 83690 ASSAY OF LIPASE: CPT | Performed by: EMERGENCY MEDICINE

## 2019-08-19 PROCEDURE — 99284 EMERGENCY DEPT VISIT MOD MDM: CPT | Mod: Z6 | Performed by: EMERGENCY MEDICINE

## 2019-08-19 PROCEDURE — 80053 COMPREHEN METABOLIC PANEL: CPT | Performed by: EMERGENCY MEDICINE

## 2019-08-19 PROCEDURE — 71046 X-RAY EXAM CHEST 2 VIEWS: CPT

## 2019-08-19 PROCEDURE — 85379 FIBRIN DEGRADATION QUANT: CPT | Performed by: EMERGENCY MEDICINE

## 2019-08-19 PROCEDURE — 25000128 H RX IP 250 OP 636: Performed by: EMERGENCY MEDICINE

## 2019-08-19 RX ORDER — SODIUM CHLORIDE 9 MG/ML
1000 INJECTION, SOLUTION INTRAVENOUS CONTINUOUS
Status: DISCONTINUED | OUTPATIENT
Start: 2019-08-19 | End: 2019-08-19 | Stop reason: HOSPADM

## 2019-08-19 RX ADMIN — SODIUM CHLORIDE 1000 ML: 9 INJECTION, SOLUTION INTRAVENOUS at 16:31

## 2019-08-19 ASSESSMENT — MIFFLIN-ST. JEOR: SCORE: 1230.2

## 2019-08-19 NOTE — ED NOTES
Present with MD while discharging pt, pt has flat affect does not make eye contact and asked if this was UC, I explained to her,  Her symptoms do not meet the criteria for UC, Dr Newberry thoroughly reviewed discharge instructions also.

## 2019-08-19 NOTE — ED NOTES
Pt reports right upper abdominal pain started 4-5 days ago, increased pain with lying flat.  Pt reports feeling bloated and fullness with lower back pain at this time.   Pt reports recently diagnosed with uterine prolapse.  Hx of hemorrhoids.

## 2019-08-19 NOTE — TELEPHONE ENCOUNTER
"\"more like discomfort, like over the last 4 days, I have noticed this, feels like I am full, but I am not, \"    Additional Information    Negative: White of the eyes have turned yellow (i.e., jaundice)    Negative: Blood in urine (red, pink, or tea-colored)    Negative: Fever > 103 F (39.4 C)    Negative: Passed out (i.e., fainted, collapsed and was not responding)    Negative: Shock suspected (e.g., cold/pale/clammy skin, too weak to stand, low BP, rapid pulse)    Negative: Sounds like a life-threatening emergency to the triager    Negative: Chest pain    Negative: Pain is mainly in upper abdomen (if needed ask: 'is it mainly above the belly button?')    Negative: Abdominal pain and pregnant > 20 weeks    Negative: Abdominal pain and pregnant < 20 weeks    Negative: SEVERE abdominal pain (e.g., excruciating)    Negative: Vomiting red blood or black (coffee ground) material    Negative: Bloody, black, or tarry bowel movements    Negative: Constant abdominal pain lasting > 2 hours    Negative: Vomiting bile (green color)    Negative: Patient sounds very sick or weak to the triager    Negative: Vomiting and abdomen looks much more swollen than usual    Negative: Fever > 101 F (38.3 C) and over 60 years of age    Negative: Fever > 100.0 F (37.8 C) and has diabetes mellitus or a weak immune system (e.g., HIV positive, cancer chemotherapy, organ transplant, splenectomy, chronic steroids)    Negative: Fever > 100.0 F (37.8 C) and bedridden (e.g., nursing home patient, stroke, chronic illness, recovering from surgery)    Negative: Pregnant or could be pregnant (i.e., missed last menstrual period)    MODERATE OR MILD pain that comes and goes (cramps) lasts > 24 hours    Answer Assessment - Initial Assessment Questions  1. LOCATION: \"Where does it hurt?\"       Under right ribs.     2. RADIATION: \"Does the pain shoot anywhere else?\" (e.g., chest, back)      No.     3. ONSET: \"When did the pain begin?\" (e.g., minutes, hours or " "days ago)       4 days ago.     4. SUDDEN: \"Gradual or sudden onset?\"      Gradual. / over the last few months more fullness.     5. PATTERN \"Does the pain come and go, or is it constant?\"     - If constant: \"Is it getting better, staying the same, or worsening?\"       (Note: Constant means the pain never goes away completely; most serious pain is constant and it progresses)      - If intermittent: \"How long does it last?\" \"Do you have pain now?\"      (Note: Intermittent means the pain goes away completely between bouts)  \"    Constant over the last 4 days, and worse when I lay down. \"  6. SEVERITY: \"How bad is the pain?\"  (e.g., Scale 1-10; mild, moderate, or severe)    - MILD (1-3): doesn't interfere with normal activities, abdomen soft and not tender to touch     - MODERATE (4-7): interferes with normal activities or awakens from sleep, tender to touch     - SEVERE (8-10): excruciating pain, doubled over, unable to do any normal activities       1-- \"discomfort\"     7. RECURRENT SYMPTOM: \"Have you ever had this type of abdominal pain before?\" If so, ask: \"When was the last time?\" and \"What happened that time?\"       Nothing like this, \"I do have some prolapse, working that up\"     8. CAUSE: \"What do you think is causing the abdominal pain?\"      *No Answer*  9. RELIEVING/AGGRAVATING FACTORS: \"What makes it better or worse?\" (e.g., movement, antacids, bowel movement)      *No Answer*  10. OTHER SYMPTOMS: \"Has there been any vomiting, diarrhea, constipation, or urine problems?\"        *No Answer*  11. 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 I just thought I could call and get an appt. I will have to call back, \"    I did look in [place] and we didn't have any openings today, suggested ED / UC, and she said \"yes, I will see or " "have to call back\"   She needed to end the call, so I did do what I could to help her, if she calls back, can be seen in the clinic,   I advised if worse in any way to be seen in the nearest ED immed. \"ok, \"     Delia Ly RNC      "

## 2019-08-19 NOTE — ED AVS SNAPSHOT
[facility] Emergency Department  [address] [address]  Phone:  [phone]  Fax:  [phone]                                    Germania Hayden   MRN: [id number]    Department:  [facility] Emergency Department   Date of Visit:  8/19/2019           After Visit Summary Signature Page    I have received my discharge instructions, and my questions have been answered. I have discussed any challenges I see with this plan with the nurse or doctor.    ..........................................................................................................................................  Patient/Patient Representative Signature      ..........................................................................................................................................  Patient Representative Print Name and Relationship to Patient    ..................................................               ................................................  Date                                   Time    ..........................................................................................................................................  Reviewed by Signature/Title    ...................................................              ..............................................  Date                                               Time          22EPIC Rev 08/18

## 2019-08-19 NOTE — ED PROVIDER NOTES
History     Chief Complaint   Patient presents with     Abdominal Pain     mid abd -started 6 days ago-cannot lay flat     HPI  Germania Hayden is a 39 year old female with weeks of abdominal discomfort, worsened in the past several days. Right upper quadrant pain is described as a distended, bloating, pressure sensation which waxes and wanes and is without clear aggravating or alleviating factors.  Moderate to severe pain refractory to OTC analgesics, exacerbated by lying supine at night and making it difficult for her to sleep.  No exacerbation with oral intake or eating.  No constipation or diarrhea. Last BM was yesterday was NL. No signs or symptoms of GI bleeding. No UTI s/sx or hematuria. No pelvic pain or vaginal bleeding or discharge.  No fever or chills.  No chest pain, cough, hemoptysis, shortness of breath or leg pain or leg swelling.  She called clinic to be seen for this was referred to the ED for evaluation    Allergies:  No Known Allergies    Problem List:    Patient Active Problem List    Diagnosis Date Noted     H/O cold sores 04/19/2018     Priority: Medium     Uterovaginal prolapse 10/27/2017     Priority: Medium     Trial of size 4 ring with support pessary 3/2018 due to 2-3+ cystocele, 2+ cervical decensus, 1+ rectocele with strain  Consider future TOTAL VAGINAL HYSTERECTOMY, A & P, SACROSPINOUS VAULT SUSPENSION  Refit with size 6 ruing with support pessary ;3/29/2019         Vulvar varicose veins 04/21/2014     Priority: Medium        Past Medical History:    Past Medical History:   Diagnosis Date     ASCUS favor benign 12/2011     Chickenpox      Depression      Eating disorder      Uterovaginal prolapse 10/27/2017       Past Surgical History:    Past Surgical History:   Procedure Laterality Date     ARTHROSCOPIC RECONSTRUCTION ANTERIOR CRUCIATE LIGAMENT  x3    twice on L knee and once on Rt knee     C BREAST AUGMENTATION       wisdom teeth         Family History:    Family History   Problem  "Relation Age of Onset     Cancer Paternal Grandmother 60        ovarian     Breast Cancer Paternal Grandmother         stage 4     Cancer Maternal Grandmother         lymphoma     Cancer Maternal Grandfather         lung ,bone     Cardiovascular Paternal Grandfather         MI     Colon Cancer Mother      Gastrointestinal Disease Brother         ulcerative colitis     Melanoma No family hx of        Social History:  Marital Status:   [2]  Social History     Tobacco Use     Smoking status: Never Smoker     Smokeless tobacco: Never Used   Substance Use Topics     Alcohol use: Yes     Alcohol/week: 0.0 oz     Comment: Occassional- quit with pregnancy     Drug use: No        Medications:      acyclovir (ZOVIRAX) 200 MG capsule   hydrocortisone (ANUSOL-HC) 25 MG suppository   Multiple Vitamins-Minerals (MULTIVITAMIN WOMEN PO)       Review of Systems  As mentioned above in the HPI, otherwise focused 10 point ROS was reviewed and was negative.  Constitutional: no fever or chills.  Ears, Nose,Throat: no sore throat.  Respiratory: no cough or shortness of breath.  Cardiovascular: no chest pain or leg swelling.  Gastrointestinal: no nausea, vomiting or blood in the stools.  Genitourinary: no acute difficulty urinating or UTI symptoms.  Musculoskeletal: no joint pain or swelling.  Skin: no rash or acute skin changes.  Neurologic: no weakness or numbness.  Hematologic: no unusual bleeding or bruising.      Physical Exam   BP: 112/79  Pulse: 101  Heart Rate: 119  Temp: 98.4  F (36.9  C)  Resp: 14  Height: 165.1 cm (5' 5\")  Weight: 54.4 kg (120 lb)  SpO2: 99 %      Physical Exam   Constitutional: She is oriented to person, place, and time. She appears well-developed and well-nourished. No distress.   HENT:   Head: Normocephalic and atraumatic.   Mouth/Throat: Oropharynx is clear and moist.   Eyes: Conjunctivae and EOM are normal. No scleral icterus.   Neck: Normal range of motion. Neck supple. No tracheal deviation " present.   Cardiovascular: Normal rate, regular rhythm and normal heart sounds. Exam reveals no gallop and no friction rub.   No murmur heard.  Pulmonary/Chest: Effort normal and breath sounds normal. No respiratory distress. She has no wheezes. She has no rales.   Abdominal: Soft. Normal appearance and bowel sounds are normal. She exhibits no distension, no abdominal bruit, no pulsatile midline mass and no mass. There is no hepatosplenomegaly. There is tenderness in the right upper quadrant. There is no rigidity, no rebound, no guarding, no CVA tenderness and no tenderness at McBurney's point. No hernia.       Musculoskeletal: Normal range of motion. She exhibits no edema or tenderness.   Neurological: She is alert and oriented to person, place, and time. Coordination normal.   Skin: Skin is warm and dry. No rash noted. She is not diaphoretic. No erythema. No pallor.   Psychiatric: She has a normal mood and affect. Her behavior is normal.   Nursing note and vitals reviewed.      ED Course        Procedures               Results for orders placed or performed during the hospital encounter of 08/19/19 (from the past 24 hour(s))   CBC with platelets differential   Result Value Ref Range    WBC 5.5 4.0 - 11.0 10e9/L    RBC Count 4.56 3.8 - 5.2 10e12/L    Hemoglobin 13.3 11.7 - 15.7 g/dL    Hematocrit 42.5 35.0 - 47.0 %    MCV 93 78 - 100 fl    MCH 29.2 26.5 - 33.0 pg    MCHC 31.3 (L) 31.5 - 36.5 g/dL    RDW 12.8 10.0 - 15.0 %    Platelet Count 248 150 - 450 10e9/L    Diff Method Automated Method     % Neutrophils 64.4 %    % Lymphocytes 28.6 %    % Monocytes 5.6 %    % Eosinophils 0.7 %    % Basophils 0.5 %    % Immature Granulocytes 0.2 %    Nucleated RBCs 0 0 /100    Absolute Neutrophil 3.5 1.6 - 8.3 10e9/L    Absolute Lymphocytes 1.6 0.8 - 5.3 10e9/L    Absolute Monocytes 0.3 0.0 - 1.3 10e9/L    Absolute Eosinophils 0.0 0.0 - 0.7 10e9/L    Absolute Basophils 0.0 0.0 - 0.2 10e9/L    Abs Immature Granulocytes 0.0 0 -  0.4 10e9/L    Absolute Nucleated RBC 0.0    Comprehensive metabolic panel   Result Value Ref Range    Sodium 141 133 - 144 mmol/L    Potassium 3.7 3.4 - 5.3 mmol/L    Chloride 109 94 - 109 mmol/L    Carbon Dioxide 27 20 - 32 mmol/L    Anion Gap 5 3 - 14 mmol/L    Glucose 112 (H) 70 - 99 mg/dL    Urea Nitrogen 10 7 - 30 mg/dL    Creatinine 0.60 0.52 - 1.04 mg/dL    GFR Estimate >90 >60 mL/min/[1.73_m2]    GFR Estimate If Black >90 >60 mL/min/[1.73_m2]    Calcium 9.1 8.5 - 10.1 mg/dL    Bilirubin Total 0.6 0.2 - 1.3 mg/dL    Albumin 4.2 3.4 - 5.0 g/dL    Protein Total 7.9 6.8 - 8.8 g/dL    Alkaline Phosphatase 42 40 - 150 U/L    ALT 21 0 - 50 U/L    AST 12 0 - 45 U/L   Lipase   Result Value Ref Range    Lipase 102 73 - 393 U/L   HCG qualitative Blood   Result Value Ref Range    HCG Qualitative Serum Negative NEG^Negative   D dimer quantitative   Result Value Ref Range    D Dimer <0.3 0.0 - 0.50 ug/ml FEU   XR Chest 2 Views    Narrative    XR CHEST 2 VW   8/19/2019 5:57 PM     HISTORY: right lower chest pain or RUQ pain    COMPARISON: None.    FINDINGS: The heart is negative.  The lungs are clear. The pulmonary  vasculature is normal.  The bones and soft tissues are unremarkable.      Impression    IMPRESSION: No active infiltrate       UA with Microscopic   Result Value Ref Range    Color Urine Straw     Appearance Urine Clear     Glucose Urine Negative NEG^Negative mg/dL    Bilirubin Urine Negative NEG^Negative    Ketones Urine 5 (A) NEG^Negative mg/dL    Specific Gravity Urine 1.006 1.003 - 1.035    Blood Urine Negative NEG^Negative    pH Urine 7.0 5.0 - 7.0 pH    Protein Albumin Urine Negative NEG^Negative mg/dL    Urobilinogen mg/dL 0.0 0.0 - 2.0 mg/dL    Nitrite Urine Negative NEG^Negative    Leukocyte Esterase Urine Negative NEG^Negative    Source Midstream Urine     WBC Urine <1 0 - 5 /HPF    RBC Urine 0 0 - 2 /HPF    Squamous Epithelial /HPF Urine <1 0 - 1 /HPF     I independently reviewed the X-rays: Agree  with the Radiologist's interpretation.      Medications   sodium chloride 0.9% infusion (has no administration in time range)   0.9% sodium chloride BOLUS (1,000 mLs Intravenous New Bag 8/19/19 1631)       Assessments & Plan (with Medical Decision Making)   Weeks of abdominal cramping, bloating and distention worse with lying supine.  Benign abdomen and negative laboratory evaluation.  Negative chest x-ray.  Emergent imaging evaluation is not felt to be indicated but will order an outpatient right upper quadrant ultrasound to facilitate her follow-up in primary care clinic in the near future.  She appears stable for discharge and doubt emergent GI, , vascular, cardiovascular or pulmonary disease process.  She was provided instructions for supportive care and will return as needed for worsened condition or worsening symptoms, or new problems or concerns.      I have reviewed the nursing notes.    I have reviewed the findings, diagnosis, plan and need for follow up with the patient.    New Prescriptions    Simethicone or antacid with simethicone prn as directed on package (OTC)       Final diagnoses:   Abdominal pain, right upper quadrant       8/19/2019   [facility] EMERGENCY DEPARTMENT     Adithya Newberry MD  08/19/19 2077

## 2019-08-21 ENCOUNTER — HOSPITAL ENCOUNTER (OUTPATIENT)
Dept: ULTRASOUND IMAGING | Facility: CLINIC | Age: 39
Discharge: HOME | End: 2019-08-21
Attending: EMERGENCY MEDICINE | Admitting: EMERGENCY MEDICINE
Payer: COMMERCIAL

## 2019-08-21 DIAGNOSIS — R10.11 ABDOMINAL PAIN, RIGHT UPPER QUADRANT: ICD-10-CM

## 2019-08-21 PROCEDURE — 76705 ECHO EXAM OF ABDOMEN: CPT

## 2019-08-23 ENCOUNTER — ANCILLARY PROCEDURE (OUTPATIENT)
Dept: GENERAL RADIOLOGY | Facility: CLINIC | Age: 39
End: 2019-08-23
Attending: FAMILY MEDICINE
Payer: COMMERCIAL

## 2019-08-23 ENCOUNTER — OFFICE VISIT (OUTPATIENT)
Dept: FAMILY MEDICINE | Facility: CLINIC | Age: 39
End: 2019-08-23
Payer: COMMERCIAL

## 2019-08-23 VITALS
RESPIRATION RATE: 14 BRPM | SYSTOLIC BLOOD PRESSURE: 100 MMHG | HEART RATE: 109 BPM | OXYGEN SATURATION: 99 % | BODY MASS INDEX: 19.79 KG/M2 | TEMPERATURE: 96.9 F | DIASTOLIC BLOOD PRESSURE: 70 MMHG | HEIGHT: 65 IN | WEIGHT: 118.8 LBS

## 2019-08-23 DIAGNOSIS — R10.84 ABDOMINAL PAIN, GENERALIZED: Primary | ICD-10-CM

## 2019-08-23 DIAGNOSIS — R10.84 ABDOMINAL PAIN, GENERALIZED: ICD-10-CM

## 2019-08-23 DIAGNOSIS — Z80.41 FAMILY HISTORY OF MALIGNANT NEOPLASM OF OVARY: ICD-10-CM

## 2019-08-23 PROCEDURE — 74019 RADEX ABDOMEN 2 VIEWS: CPT

## 2019-08-23 PROCEDURE — 99214 OFFICE O/P EST MOD 30 MIN: CPT | Performed by: FAMILY MEDICINE

## 2019-08-23 ASSESSMENT — PAIN SCALES - GENERAL: PAINLEVEL: MILD PAIN (2)

## 2019-08-23 ASSESSMENT — MIFFLIN-ST. JEOR: SCORE: 1224.75

## 2019-08-23 NOTE — NURSING NOTE
"Chief Complaint   Patient presents with     RECHECK       Initial /70 (BP Location: Right arm, Patient Position: Chair, Cuff Size: Adult Regular)   Pulse 109   Temp 96.9  F (36.1  C) (Tympanic)   Resp 14   Ht 1.651 m (5' 5\")   Wt 53.9 kg (118 lb 12.8 oz)   SpO2 99%   BMI 19.77 kg/m   Estimated body mass index is 19.77 kg/m  as calculated from the following:    Height as of this encounter: 1.651 m (5' 5\").    Weight as of this encounter: 53.9 kg (118 lb 12.8 oz).    Medication Reconciliation: complete    Patience Ceja MA  "

## 2019-08-23 NOTE — PROGRESS NOTES
SUBJECTIVE:                                                    Germania Hayden is 39 year old female   Chief Complaint   Patient presents with     RECHECK     ED/UC Followup:  Facility:  [facility]  Date of visit: 8/19/2019  Reason for visit: RUQ abdominal pain, unknown cause. Abdominal US completed 8/21/19  Current Status: Slight change in bowel habits, gas and bloating, lower back pain, pressure in lower abdomin with radiation of pressure into RUQ. 2 doses of ibuprofen over the last week and heating pad.     Patient's last menstrual period was 08/17/2019 (approximate).     Problem list and histories reviewed & adjusted, as indicated.  Additional history: as documented    Patient Active Problem List   Diagnosis     Vulvar varicose veins     Uterovaginal prolapse     H/O cold sores     Past Surgical History:   Procedure Laterality Date     ARTHROSCOPIC RECONSTRUCTION ANTERIOR CRUCIATE LIGAMENT  x3    twice on L knee and once on Rt knee     C BREAST AUGMENTATION       wisdom teeth         Social History     Tobacco Use     Smoking status: Never Smoker     Smokeless tobacco: Never Used   Substance Use Topics     Alcohol use: Yes     Alcohol/week: 0.0 oz     Comment: Occassional- quit with pregnancy     Family History   Problem Relation Age of Onset     Cancer Paternal Grandmother 60        ovarian     Breast Cancer Paternal Grandmother         stage 4     Cancer Maternal Grandmother         lymphoma     Cancer Maternal Grandfather         lung ,bone     Cardiovascular Paternal Grandfather         MI     Colon Cancer Mother      Gastrointestinal Disease Brother         ulcerative colitis     Melanoma No family hx of          Current Outpatient Medications   Medication Sig Dispense Refill     acyclovir (ZOVIRAX) 200 MG capsule Take 1 capsule (200 mg) by mouth 2 times daily 180 capsule 3     hydrocortisone (ANUSOL-HC) 25 MG suppository Place 1 suppository (25 mg) rectally 2 times daily (Patient not taking:  "Reported on 3/29/2019) 1 Box 1     Multiple Vitamins-Minerals (MULTIVITAMIN WOMEN PO)        No Known Allergies  Recent Labs   Lab Test 08/19/19  1605 04/19/18  0900   ALT 21 22   CR 0.60 0.67   GFRESTIMATED >90 >90   GFRESTBLACK >90 >90   POTASSIUM 3.7 4.1      BP Readings from Last 3 Encounters:   08/23/19 100/70   08/19/19 104/72   06/17/19 98/71    Wt Readings from Last 3 Encounters:   08/23/19 53.9 kg (118 lb 12.8 oz)   08/19/19 54.4 kg (120 lb)   03/29/19 54.4 kg (120 lb)         ROS:  Constitutional, HEENT, cardiovascular, pulmonary, gi and gu systems are negative, except as otherwise noted.    OBJECTIVE:                                                    /70 (BP Location: Right arm, Patient Position: Chair, Cuff Size: Adult Regular)   Pulse 109   Temp 96.9  F (36.1  C) (Tympanic)   Resp 14   Ht 1.651 m (5' 5\")   Wt 53.9 kg (118 lb 12.8 oz)   LMP 08/17/2019 (Approximate)   SpO2 99%   BMI 19.77 kg/m     GENERAL APPEARANCE ADULT: Alert, no acute distress, anxious, cooperative, rapid speech, hoarse voice  RESP: lungs clear to auscultation   CV: rapid rate, regular rhythm, no murmur or gallop  ABDOMEN: soft, no organomegaly, masses or tenderness, normal bowel sounds  PSYCH: mentation appears normal., anxious  Diagnostic Test Results:  Results for orders placed or performed during the hospital encounter of 08/21/19   US Abdomen Limited    Narrative    US ABDOMEN LIMITED 8/21/2019 11:15 AM    HISTORY: Right upper quadrant pain.    TECHNIQUE: Limited abdominal ultrasound to the right upper quadrant is  performed.    COMPARISON: None.    FINDINGS: No gallstones, gallbladder wall thickening, or biliary  dilatation are seen. The liver shows no focal finding. The pancreas  and right kidney are within normal limits.        Impression    IMPRESSION:  Normal right upper quadrant ultrasound.      MARIO JOE MD          ASSESSMENT/PLAN:                                                    1. Abdominal pain, " generalized  Bloating and intermittent pain.  Sounds functional, brother with chron's, denies blood in stool.  Had bowel movement today.  Has changed diet to more nuts and protein  - XR Abdomen 2 Views; Future    2. Family history of malignant neoplasm of ovary  For reassurance get pelvic ultrasound, if normal see Dr. Arango for further evaluation.  - US Pelvic Complete with Transvaginal; Future    Julia Lobo MD  [facility]

## 2019-08-27 ENCOUNTER — HOSPITAL ENCOUNTER (OUTPATIENT)
Dept: ULTRASOUND IMAGING | Facility: CLINIC | Age: 39
Discharge: HOME | End: 2019-08-27
Attending: FAMILY MEDICINE | Admitting: FAMILY MEDICINE
Payer: COMMERCIAL

## 2019-08-27 DIAGNOSIS — Z80.41 FAMILY HISTORY OF MALIGNANT NEOPLASM OF OVARY: ICD-10-CM

## 2019-08-27 PROCEDURE — 76830 TRANSVAGINAL US NON-OB: CPT

## 2019-08-29 ENCOUNTER — TELEPHONE (OUTPATIENT)
Dept: OBGYN | Facility: CLINIC | Age: 39
End: 2019-08-29

## 2019-08-29 DIAGNOSIS — Z12.12 SCREENING FOR MALIGNANT NEOPLASM OF THE RECTUM: Primary | ICD-10-CM

## 2019-08-29 NOTE — TELEPHONE ENCOUNTER
Will send to Dr. Bishop to review and advise on hysteroscopy in clinic.    Will send to [email or web address] to print order for Colonoscopy from Dr. Arango. After she signs, can be faxed by  to number listed.    Gemma Miller   [facility]  RN

## 2019-08-29 NOTE — TELEPHONE ENCOUNTER
Reason for call:  Patient reporting a symptom    Symptom or request: Pt has an appt on Tuesday for consult with Dr. Funes.  Had US done recently -recommended on the results that she have a Hysteroscopy - pt wondering if this can this be done in the clinic?  I told her that I didn't think we could do this but it can be discussed at her appt.    Also a couple of months ago an order was placed for Colonoscopy.  Pt states she wasn't sure if she wanted to have this done but now she does.  When she called to schedule here in [place] we were booking out kind of far.  [facility] has openings sooner.  Wondering if she can get order to go there.  Their Fax [phone].     Duration (how long have symptoms been present):     Have you been treated for this before?     Additional comments:     Phone Number patient can be reached at:  Home number on file [phone] (home)    Best Time:      Can we leave a detailed message on this number:  YES    Call taken on 8/29/2019 at 9:23 AM by Milly Ramey

## 2019-08-29 NOTE — TELEPHONE ENCOUNTER
Given her ultrasound findings, it appears that she has a possible endometrial polyp. Removing that in the clinic via hysteroscopy is not recommended since resection of tissue is needed which would cause discomfort that could not be adequately addressed in a clinical setting. Therefore, it would have to be scheduled in the OR where she can receive adequate anesthesia to tolerate the procedure. Obviously, this would be an outpatient procedure as well.     In terms of her colonoscopy, I will be okay to sign that print order if it is provided to me so that it can be faxed.     Marin Bishop MD  [facility]

## 2019-08-29 NOTE — TELEPHONE ENCOUNTER
Pt notified of below.  Pt reports understanding.  Pt does not have further questions or concerns.    Referral generated for [facility].  Will have Dr. Bishop sign and  to fax.    Gemma Miller   [facility]  RN

## 2019-09-03 ENCOUNTER — TELEPHONE (OUTPATIENT)
Dept: OBGYN | Facility: CLINIC | Age: 39
End: 2019-09-03

## 2019-09-03 ENCOUNTER — OFFICE VISIT (OUTPATIENT)
Dept: OBGYN | Facility: CLINIC | Age: 39
End: 2019-09-03
Payer: COMMERCIAL

## 2019-09-03 ENCOUNTER — HOSPITAL ENCOUNTER (OUTPATIENT)
Dept: CT IMAGING | Facility: CLINIC | Age: 39
Discharge: HOME | End: 2019-09-03
Attending: OBSTETRICS & GYNECOLOGY | Admitting: OBSTETRICS & GYNECOLOGY
Payer: COMMERCIAL

## 2019-09-03 VITALS
HEART RATE: 95 BPM | DIASTOLIC BLOOD PRESSURE: 75 MMHG | WEIGHT: 123 LBS | SYSTOLIC BLOOD PRESSURE: 113 MMHG | BODY MASS INDEX: 20.47 KG/M2 | TEMPERATURE: 96.2 F

## 2019-09-03 DIAGNOSIS — N84.0 ENDOMETRIAL POLYP: ICD-10-CM

## 2019-09-03 DIAGNOSIS — N83.202 LEFT OVARIAN CYST: ICD-10-CM

## 2019-09-03 DIAGNOSIS — R10.11 ABDOMINAL PAIN, RIGHT UPPER QUADRANT: ICD-10-CM

## 2019-09-03 DIAGNOSIS — R10.11 ABDOMINAL PAIN, RIGHT UPPER QUADRANT: Primary | ICD-10-CM

## 2019-09-03 DIAGNOSIS — Z80.41 FAMILY HISTORY OF MALIGNANT NEOPLASM OF OVARY: ICD-10-CM

## 2019-09-03 DIAGNOSIS — K76.9 LIVER LESION, RIGHT LOBE: ICD-10-CM

## 2019-09-03 DIAGNOSIS — N81.4 UTERINE PROLAPSE: ICD-10-CM

## 2019-09-03 LAB — CANCER AG125 SERPL-ACNC: 10 U/ML (ref 0–30)

## 2019-09-03 PROCEDURE — 86304 IMMUNOASSAY TUMOR CA 125: CPT | Performed by: OBSTETRICS & GYNECOLOGY

## 2019-09-03 PROCEDURE — 25000125 ZZHC RX 250: Performed by: RADIOLOGY

## 2019-09-03 PROCEDURE — 99215 OFFICE O/P EST HI 40 MIN: CPT | Performed by: OBSTETRICS & GYNECOLOGY

## 2019-09-03 PROCEDURE — 25000128 H RX IP 250 OP 636: Performed by: RADIOLOGY

## 2019-09-03 PROCEDURE — 36415 COLL VENOUS BLD VENIPUNCTURE: CPT | Performed by: OBSTETRICS & GYNECOLOGY

## 2019-09-03 PROCEDURE — 74177 CT ABD & PELVIS W/CONTRAST: CPT

## 2019-09-03 RX ORDER — IOPAMIDOL 755 MG/ML
61 INJECTION, SOLUTION INTRAVASCULAR ONCE
Status: COMPLETED | OUTPATIENT
Start: 2019-09-03 | End: 2019-09-03

## 2019-09-03 RX ADMIN — SODIUM CHLORIDE 55 ML: 9 INJECTION, SOLUTION INTRAVENOUS at 10:31

## 2019-09-03 RX ADMIN — IOPAMIDOL 61 ML: 755 INJECTION, SOLUTION INTRAVENOUS at 10:30

## 2019-09-03 NOTE — TELEPHONE ENCOUNTER
Pt notified of below.  Pt reports understanding.  Pt does not have further questions or concerns. Referral and orders generated.    Gemma Miller   [facility]  RN

## 2019-09-03 NOTE — PROGRESS NOTES
Patient seen in consultation at the request of Julia Lobo MD to discuss ovarian cancer screening given patient's family history of ovarian cancer, pelvic ultrasound findings of an ovarian cyst and endometrial polyp as well as discussion of uterine prolapse.     Ms. Germania Hayden 39 year old presents to discuss several concerns.   She states that prior to her last menstrual period noting unusual symptoms that she does not usually experience prior to onset of her previous menstrual periods. These symptoms included bloating, cramping that mimic caitlin hick contraction and pain in the right upper quadrant of her abdomen. These symptoms prompted her to seek emergent medical attention which lead to her getting an abdominal ultrasound performed that revealed nothing abnormal.     However, despite resolution of her ensuing period, she still reported persistence of the right upper quadrant pain which she sought outpatient management for. She saw Dr. Lobo who ordered both a abdominal X-ray showing moderate to large amounts of stool in her colon and a pelvic ultrasound which showed a complex left ovarian about 2 cm in size as well as a small endometrial polyp.   As a result of her pelvic ultrasound findings, she was referred to me to discuss management.   Of note, she reports monthly interval periods that are not heavy and cramping. She denies history of intermenstrual bleeding and postcoital bleeding. In regards to the findings of her abdominal x-ray, at the time of being notified of the results, she denies having constipation and felt as though her bowel movement habits were normal. However, since that x-ray she does report constipation presenting itself later on with the need for her to initiate stool softeners and has had some improvement since. She still continued to have pain in the area however even after taking stool softeners.     She also endorses a history of a maternal grandmother diagnosed with early  stage ovarian cancer, but who ultimately passed away from breast cancer. Her mother had a hysterectomy done to address endometriosis. Her sister suffers from recurrent simple ovarian cysts. She highlights these family medical problems as she is anxious about the prospects of ovarian cancer and she would like to know how that can be evaluated.     She also endorses having been worked up for uterine prolapse. She states that use of pessary has not been helpful and that she has sought consultation with [facility] and discussed surgical management. She inquires as to whether this condition has anything to do with her above complaints.     She is currently not on contraception. She does acknowledge that her  has contemplated pursuit of a vasectomy.       Past Medical History:   Diagnosis Date     ASCUS favor benign 12/2011    neg HPV, repeat Pap + HPV cotesting in 3 years     Chickenpox      Depression      Eating disorder     bulemia- age 18 to 32     Uterovaginal prolapse 10/27/2017     Past Surgical History:   Procedure Laterality Date     ARTHROSCOPIC RECONSTRUCTION ANTERIOR CRUCIATE LIGAMENT  x3    twice on L knee and once on Rt knee     C BREAST AUGMENTATION       wisdom teeth       Current Outpatient Medications   Medication     acyclovir (ZOVIRAX) 200 MG capsule     hydrocortisone (ANUSOL-HC) 25 MG suppository     Multiple Vitamins-Minerals (MULTIVITAMIN WOMEN PO)     No current facility-administered medications for this visit.       No Known Allergies    Social History     Tobacco Use     Smoking status: Never Smoker     Smokeless tobacco: Never Used   Substance Use Topics     Alcohol use: Yes     Alcohol/week: 0.0 oz     Comment: Occassional- quit with pregnancy     Drug use: No     Family History   Problem Relation Age of Onset     Cancer Paternal Grandmother 60        ovarian     Breast Cancer Paternal Grandmother         stage 4     Cancer Maternal Grandmother         lymphoma      Cancer Maternal Grandfather         lung ,bone     Cardiovascular Paternal Grandfather         MI     Colon Cancer Mother      Gastrointestinal Disease Brother         ulcerative colitis     Melanoma No family hx of      C: NEGATIVE for fever, chills, change in weight  HEENT: NEGATIVE for visual changes, runny nose, epistaxis, ear pain, tinnitus, tooth ache, sore throat, difficulty with swallowing, sinus pain  R: NEGATIVE for significant cough or SOB  CV: NEGATIVE for chest pain, palpitations or peripheral edema  BREAST: NEGATIVE For soreness, pain, lumps or nipple discharge  GI: NEGATIVE for nausea, abdominal pain, heartburn, or change in bowel habits  : NEGATIVE for frequency, dysuria, hematuria, vaginal discharge, or irregular bleeding  Neuro: NEGATIVE for numbness, tingling, focal weakness, or headache  INT: NEGATIVE for rashes, lesions or pruritis   PSYCH: NEGATIVE for anxiety, depression, or halluciinations    Exam:   /75 (BP Location: Right arm, Patient Position: Chair, Cuff Size: Adult Regular)   Pulse 95   Temp 96.2  F (35.7  C) (Tympanic)   Wt 55.8 kg (123 lb)   LMP 08/17/2019 (Approximate)   Breastfeeding? No   BMI 20.47 kg/m    General Appearance: Well nourished, well developed female, NAD, AOx3  Neurological: Mental Status Normal and Station and Gait Normal   Skin: Normal skin turgor  HEET: Atraumatic, normocephalic, EOMI  Neck: Supple,no adenopathy,thyroid normal  Lungs: Good respiratory effort  Abdomen: Soft, NT, ND, no masses  Pelvic: Normal external female genitalia.  No external lesions, normal hair distribution, no adenopathy. Speculum exam reveals vaginal epithelium well rugated with no abnormal discharge. Cervix appears smooth, pink, with no visible lesions. Bimanual exam reveals normal size uterus, anteverted, non-tender, and mobile. No adnexal masses or tenderness. No cervical motion tenderness. Grade 1 cystocele and rectocele noted along with stage 2 cervical/apical prolapse.    Extremities: No clubbing, no cyanosis and no edema    Imaging:   Pelvic US 8/27/2019  FINDINGS: The uterus measures 7.9 x 4.2 x 5.3 cm. No focal lesions are  seen in the myometrium. Endometrium is 11 mm thick. In the lower  endometrial canal, there appears to be a 0.7 x 0.6 x 0.4 cm  nonshadowing echogenic structure.    The right ovary measures 3.2 x 1.5 x 2.9 cm and appears normal. The  left ovary measures 2.5 x 2.6 x 3.3 cm and contains a complex cystic  structure that measures 2.0 x 2.2 x 1.6 cm. There are no adnexal  masses. There is a small amount of free fluid in the cul-de-sac.                                                                 IMPRESSION:    1. Complex cystic structure in the left ovary likely a hemorrhagic or  involuting cyst. Consider follow-up ultrasound in eight to 10 weeks.  2. Possible polyp in the lower endometrium measuring 0.7 cm. Consider  correlation with hysteroscopy.   COLE JOSEPH MD    I, personally, reviewed ultrasound images and report with patient.     A/P:   Right upper quadrant pain  -- etiology of this complaint uncertain, however, abdominal x-ray findings being consistent with constipation could certainly be playing a role  -- discussed that despite Gyn ultrasound findings seen, that the likelihood for those findings to cause her discomfort is low  -- at the request of the patient, CT abdomen/pelvis was ordered to assess for other etiologies of her pain    Endometrial polyp  -- given absence of associated clinical symptoms ie intermenstrual bleeding, irregular menses, post-coital bleeding; reviewed that observation can be a reasonable treatment options given her asymptomatic status  -- reassurance given regarding the benign nature of endometrial polyps  -- also reassurance given regarding low likelihood of polyp being the cause of her pain described above  -- patient in the end felt comfortable with observation rather than pursing surgical management to resect  polyp    Left ovarian cyst  -- reviewed findings of pelvic ultrasound  -- provided reassurance that despite complex character of ovarian cyst that it is statistically likely for it to be benign  -- therefore, recommended repeat pelvic US in 4-6 weeks to assess for resolution of ovarian cyst    Family hx of ovarian cancer  -- discussed inherent limitation with screening for ovarian cancer in terms of medical tools are generally not accurate ie pelvic US and CA-125  -- discussed unintended consequences of pursuing oophorectomy as a preventative measure against ovarian cancer in regards to surgical menopause and impact on risk increased with cardiovascular co morbidities and osteoporosis; not to mention that oophorectomy alone would not prevent ovarian cancer as primary peritoneal cancer can occur as well    Uterine prolapse  -- discussed surgical options, particularly regarding hysterectomy in combination with sacro-colpopexy and the details related to that procedure  -- reassurance provided regarding no relation existing between this condition and above symptoms      Total time spent was 60 minutes; greater than 50% of time was spent in counseling and/or coordination of care for the above listed diagnoses, not including time spent on the procedure.      Marin Bishop MD  [facility]

## 2019-09-03 NOTE — RESULT ENCOUNTER NOTE
Inform patient that her CT showed 3 cm mass in right lobe of the liver. Its difficult to know what the mass is as seen with the CT but the radiologist is recommending an MRI be done to further elucidate the mass. Please place order for the MRI and also referral for gastroenterology for further recommendations.     As for the left ovarian cyst, they see that it is still there which does not come as a shock given how little time has passed since the pelvic ultrasound she had on 8/27. Again, as discussed in clinic, this will be followed-up in 4-6 weeks with pelvic ultrasound.     Marin Bishop MD  [facility]

## 2019-09-03 NOTE — TELEPHONE ENCOUNTER
Reason for Call:  Request for results:    Name of test or procedure: CT abdomen and pelvis    Date of test of procedure: 09/03/2019    Location of the test or procedure: [facility]    OK to leave the result message on voice mail or with a family member? YES    Phone number Patient can be reached at:  Home number on file [phone] (home)    Additional comments: NA    Call taken on 9/3/2019 at 12:40 PM by Denise Behrendt

## 2019-09-03 NOTE — TELEPHONE ENCOUNTER
Notes recorded by Marin Bishop MD on 9/3/2019 at 1:32 PM CDT  Inform patient that her CT showed 3 cm mass in right lobe of the liver. Its difficult to know what the mass is as seen with the CT but the radiologist is recommending an MRI be done to further elucidate the mass. Please place order for the MRI and also referral for gastroenterology for further recommendations.     As for the left ovarian cyst, they see that it is still there which does not come as a shock given how little time has passed since the pelvic ultrasound she had on 8/27. Again, as discussed in clinic, this will be followed-up in 4-6 weeks with pelvic ultrasound.     Marin Bishop MD  [facility]

## 2019-09-03 NOTE — NURSING NOTE
"Initial /75 (BP Location: Right arm, Patient Position: Chair, Cuff Size: Adult Regular)   Pulse 95   Temp 96.2  F (35.7  C) (Tympanic)   Wt 55.8 kg (123 lb)   LMP 08/17/2019 (Approximate)   Breastfeeding? No   BMI 20.47 kg/m   Estimated body mass index is 20.47 kg/m  as calculated from the following:    Height as of 8/23/19: 1.651 m (5' 5\").    Weight as of this encounter: 55.8 kg (123 lb). .    Annette Ma MA    "

## 2019-09-04 ENCOUNTER — ANCILLARY PROCEDURE (OUTPATIENT)
Dept: MRI IMAGING | Facility: CLINIC | Age: 39
End: 2019-09-04
Attending: OBSTETRICS & GYNECOLOGY
Payer: COMMERCIAL

## 2019-09-04 DIAGNOSIS — K76.9 LIVER LESION, RIGHT LOBE: ICD-10-CM

## 2019-09-04 DIAGNOSIS — R10.11 ABDOMINAL PAIN, RIGHT UPPER QUADRANT: ICD-10-CM

## 2019-09-04 PROCEDURE — 74183 MRI ABD W/O CNTR FLWD CNTR: CPT | Mod: TC

## 2019-09-04 PROCEDURE — A9585 GADOBUTROL INJECTION: HCPCS

## 2019-09-04 RX ORDER — GADOBUTROL 604.72 MG/ML
6 INJECTION INTRAVENOUS ONCE
Status: COMPLETED | OUTPATIENT
Start: 2019-09-04 | End: 2019-09-04

## 2019-09-04 RX ADMIN — GADOBUTROL 7.5 ML: 604.72 INJECTION INTRAVENOUS at 08:43

## 2019-09-05 NOTE — RESULT ENCOUNTER NOTE
Inform patient that her CA-125 lab returned normal.     Marin Bishop MD  [facility]

## 2019-09-05 NOTE — RESULT ENCOUNTER NOTE
Inform patient that her MRI shows that the mass seen on her CT scan appears to be consistent benign entities.   It does not appear to be consistent with a malignancy given its features.   That said, I am not certain that this is what is causing her pain. It is still encouraged for her to see gastroenterology to get their opinion on this MRI finding, given that this is their expertise. It is possible that they do not feel that this is causing her pain and no intervention is indicated, other than follow-up imaging to monitor its size. But again, they would be the one to make that determination.     Marin Bishop MD  [facility]

## 2019-09-06 ENCOUNTER — TRANSFERRED RECORDS (OUTPATIENT)
Dept: HEALTH INFORMATION MANAGEMENT | Facility: CLINIC | Age: 39
End: 2019-09-06

## 2019-09-12 NOTE — TELEPHONE ENCOUNTER
amand  RECORDS RECEIVED FROM: Internal - Abdominal pain, right upper quadrant [R10.11];Liver lesion, right lobe [K76.9], scheduled per pt   DATE RECEIVED: 09.23.2019   NOTES STATUS DETAILS   OFFICE NOTE from referring provider Internal 09.03.2019   OFFICE NOTES from other specialists N/A    DISCHARGE SUMMARY from hospital N/A    MEDICATION LIST Internal  Care Everywhere    LIVER BIOSPY (IF APPLICABLE)      PATHOLOGY REPORTS  N/A    IMAGING     ENDOSCOPY (IF AVAILABLE) Received 09.06.2019   COLONOSCOPY (IF AVAILABLE) N/A    ULTRASOUND LIVER Internal 08.21.2019   CT OF ABDOMEN Internal 09.03.2019   MRI OF LIVER Internal 09.04.2019   FIBROSCAN, US ELASTOGRAPHY, FIBROSIS SCAN, MR ELASTOGRAPHY N/A    LABS     HEPATIC PANEL (LIVER PANEL) N/A    BASIC METABOLIC PANEL N/A    COMPLETE METABOLIC PANEL N/A    COMPLETE BLOOD COUNT (CBC) Internal 08.19.2019   INTERNATIONAL NORMALIZED RATIO (INR) N/A    HEPATITIS C ANTIBODY N/A    HEPATITIS C VIRAL LOAD/PCR N/A    HEPATITIS C GENOTYPE N/A    HEPATITIS B SURFACE ANTIGEN Internal 04.08.2016   HEPATITIS B SURFACE ANTIBODY N/A    HEPATITIS B DNA QUANT LEVEL N/A    HEPATITIS B CORE ANTIBODY N/A

## 2019-09-23 ENCOUNTER — OFFICE VISIT (OUTPATIENT)
Dept: GASTROENTEROLOGY | Facility: CLINIC | Age: 39
End: 2019-09-23
Attending: OBSTETRICS & GYNECOLOGY
Payer: COMMERCIAL

## 2019-09-23 ENCOUNTER — PRE VISIT (OUTPATIENT)
Dept: GASTROENTEROLOGY | Facility: CLINIC | Age: 39
End: 2019-09-23

## 2019-09-23 ENCOUNTER — TELEPHONE (OUTPATIENT)
Dept: GASTROENTEROLOGY | Facility: CLINIC | Age: 39
End: 2019-09-23

## 2019-09-23 VITALS
BODY MASS INDEX: 20.6 KG/M2 | HEART RATE: 93 BPM | OXYGEN SATURATION: 100 % | TEMPERATURE: 97.9 F | SYSTOLIC BLOOD PRESSURE: 117 MMHG | DIASTOLIC BLOOD PRESSURE: 77 MMHG | WEIGHT: 123.8 LBS

## 2019-09-23 DIAGNOSIS — R10.11 ABDOMINAL PAIN, RIGHT UPPER QUADRANT: ICD-10-CM

## 2019-09-23 DIAGNOSIS — K76.9 LIVER LESION, RIGHT LOBE: ICD-10-CM

## 2019-09-23 DIAGNOSIS — K76.9 LIVER LESION, RIGHT LOBE: Primary | ICD-10-CM

## 2019-09-23 PROCEDURE — G0463 HOSPITAL OUTPT CLINIC VISIT: HCPCS | Mod: ZF

## 2019-09-23 ASSESSMENT — PAIN SCALES - GENERAL: PAINLEVEL: NO PAIN (1)

## 2019-09-23 NOTE — NURSING NOTE
"Chief Complaint   Patient presents with     New Patient     NEW: abdominal \"pressure\" ongoing for  weeks         /77 (BP Location: Right arm, Patient Position: Sitting, Cuff Size: Adult Regular)   Pulse 93   Temp 97.9  F (36.6  C)   Wt 56.2 kg (123 lb 12.8 oz)   SpO2 100%   BMI 20.60 kg/m        Gaston Perez, EMT    "

## 2019-09-23 NOTE — TELEPHONE ENCOUNTER
[facility] Call Center    Phone Message    May a detailed message be left on voicemail: yes    Reason for Call: Other: Patient called to speak with Tatiana WOLFE to schedule an MRI//She was transferred to Imaging but she would appreciate a call back from Tatiana WOLFE asa//Thanks.     Action Taken: Message routed to:  [facility] ([facility] Hepatology

## 2019-09-23 NOTE — LETTER
9/23/2019       RE: Germania Hayden  [address]  [place] [address]     Dear Colleague,    Thank you for referring your patient, Germania Hayden, to the [facility] HEPATOLOGY at [facility]. Please see a copy of my visit note below.    [facility]    Hepatology New Patient Visit    Referring provider:  Marin Bishop    CHIEF COMPLAINT AND REASON FOR THE VISIT:  Liver lesion.      HISTORY OF PRESENT ILLNESS:  Ms. Hayden is a 39-year-old female with a past history of ovarian cyst on the left side and she is coming to us for abdominal pain on the right upper quadrant area and history of rectal bleeding and bloating.  She is telling me now that her right upper quadrant abdominal discomfort gets worse with sneezing and coughing.  It is not associated with nausea and vomiting.  She does have bowel movements which she has seen some blood in it.  She had a colonoscopy that was done which was completely normal.  Currently, her weight has remained stable.  Her appetite is good.  Denies any odynophagia or dysphagia.     Medical hx Surgical hx   Past Medical History:   Diagnosis Date     ASCUS favor benign 12/2011    neg HPV, repeat Pap + HPV cotesting in 3 years     Chickenpox      Depression      Eating disorder     bulemia- age 18 to 32     Uterovaginal prolapse 10/27/2017      Past Surgical History:   Procedure Laterality Date     ARTHROSCOPIC RECONSTRUCTION ANTERIOR CRUCIATE LIGAMENT  x3    twice on L knee and once on Rt knee     C BREAST AUGMENTATION       wisdom teeth            Medications  Prior to Admission medications    Medication Sig Start Date End Date Taking? Authorizing Provider   acyclovir (ZOVIRAX) 200 MG capsule Take 1 capsule (200 mg) by mouth 2 times daily 8/2/19  Yes Chioma Nesbitt MD       Allergies  No Known Allergies    Family hx Social hx   Family History   Problem Relation Age of Onset     Cancer Paternal  Grandmother 60        ovarian     Breast Cancer Paternal Grandmother         stage 4     Cancer Maternal Grandmother         lymphoma     Cancer Maternal Grandfather         lung ,bone     Cardiovascular Paternal Grandfather         MI     Colon Cancer Mother      Gastrointestinal Disease Brother         ulcerative colitis     Melanoma No family hx of       Social History     Tobacco Use     Smoking status: Never Smoker     Smokeless tobacco: Never Used   Substance Use Topics     Alcohol use: Yes     Alcohol/week: 0.0 standard drinks     Comment: Occassional- quit with pregnancy     Drug use: No          REVIEW OF SYSTEMS:  She did not have any recent infections but admits to having fatigue.  Denies any headaches.  Denies any seizures, does not have any cough, shortness of breath or dyspnea on exertion.  No chest pain.  No anemia, no easy bruising.  Had in the past had arthroscopic reconstruction of the anterior cruciate ligament.  Otherwise, no other issues regarding that.  Denies any depression now but was diagnosed with that in the past.  Denies any eye, hearing or skin issues.  Otherwise, a comprehensive review of symptoms was noncontributory.     Examination  /77 (BP Location: Right arm, Patient Position: Sitting, Cuff Size: Adult Regular)   Pulse 93   Temp 97.9  F (36.6  C)   Wt 56.2 kg (123 lb 12.8 oz)   SpO2 100%   BMI 20.60 kg/m     Body mass index is 20.6 kg/m .    Gen- well, NAD, A+Ox3, normal color  Eye- EOMI  ENT- MMM, normal oropharynx  Lym- no palpable lymphadenopathy  CVS- S1, S2 normal, no added sounds, RRR  RS- CTA  Abd- Not distended. Tender at the RUQ. No hepatosplenomegaly.  Extr- pulses good, no SHIVA  MS- hands normal- no clubbing  Neuro- A+Ox3, no asterixis  Skin- no rash or jaundice  Psych- normal mood    Laboratory  Lab Results   Component Value Date     08/19/2019    POTASSIUM 3.7 08/19/2019    CHLORIDE 109 08/19/2019    CO2 27 08/19/2019    BUN 10 08/19/2019    CR 0.60  08/19/2019       Lab Results   Component Value Date    BILITOTAL 0.6 08/19/2019    ALT 21 08/19/2019    AST 12 08/19/2019    ALKPHOS 42 08/19/2019       Lab Results   Component Value Date    ALBUMIN 4.2 08/19/2019    PROTTOTAL 7.9 08/19/2019        Lab Results   Component Value Date    WBC 5.5 08/19/2019    HGB 13.3 08/19/2019    MCV 93 08/19/2019     08/19/2019       No results found for: INR      Radiology    ASSESSMENT AND PLAN:  Incidental finding on images of a liver lesion.  Ms. Hayden was having a workup for abdominal pain, right upper quadrant discomfort and ruptured ovarian cyst on the left.  During that workup on imaging, which was a CT scan, she was found to have a liver lesion which they are not quite sure what it was and that CT of the abdomen and pelvis was read as showing the subtle mass in the inferior right lobe of the liver with size of 2.5 x 2.3 x 3.0 cm.  The impression was that it was not clear, and they recommended an MRI, which was done, and that MRI did show that same lesion, which was in segment 6.  They thought that this lesion has early portal venous phase and is a relatively hypoenhancing structure.  They thought that this does not show any signs of overt features of malignancy, but might represent atypical FNH, hemangioma or adenoma.  They asked this to be reevaluated with another MRI in 3 months.        I had a discussion with the radiologist and we decided that this should be presented at the multidisciplinary tumor conference here at the [facility] and we will get other images of MRI with [facility] as that might clarify.  The radiologist thinks that this is most likely FNH versus adenoma rather than a hemangioma and he looked also at her abdominal ultrasound.        The plan will be to present her at the multidisciplinary tumor conference and at the same time get prior to that an MRI with [facility].        Besides that, the right upper quadrant abdominal pain, which  has not been yet identified what the cause is. This  lesion  Is too small to explain that.  She might have interposition of her colon, between  the diaphragm and liver, (Chilaiditi syndrome ). Other  upper GI  Problems are possible.  As patient did not have an upper endoscopy, we will do that and check for peptic ulcer disease or other pathology that could explain what that could be from.  The most likely diagnosis, in my opinion, is that this is musculoskeletal discomfort as it is provokable.  She occasionally uses NSAIDs for this.  The plan will be to get an upper endoscopy in the near future and see her here after that to discuss findings with her.        For her other medical issues, she will follow with her gynecologist and her primary care physician.      This was a 45-minute visit of which more than 50% was spent in explaining to the patient what our plan of care was.  We answered all of her questions.      cc:     WILLIAMS Kelly, CNP   [facility]    [address] [place] [address]       Shirlene Wilson MD  Hepatology  [facility]        Again, thank you for allowing me to participate in the care of your patient.      Sincerely,    Shirlene Wilson MD

## 2019-09-23 NOTE — TELEPHONE ENCOUNTER
Patient spoke with Dr. Wilson regarding need for MRI. Case will be presented at tumor conference.

## 2019-09-23 NOTE — LETTER
9/23/2019      RE: Germania Hayden  [address]  [facility] [address]       [facility]    Hepatology New Patient Visit    Referring provider:  Marin Bishop    CHIEF COMPLAINT AND REASON FOR THE VISIT:  Liver lesion.      HISTORY OF PRESENT ILLNESS:  Ms. Hayden is a 39-year-old female with a past history of ovarian cyst on the left side and she is coming to us for abdominal pain on the right upper quadrant area and history of rectal bleeding and bloating.  She is telling me now that her right upper quadrant abdominal discomfort gets worse with sneezing and coughing.  It is not associated with nausea and vomiting.  She does have bowel movements which she has seen some blood in it.  She had a colonoscopy that was done which was completely normal.  Currently, her weight has remained stable.  Her appetite is good.  Denies any odynophagia or dysphagia.     Medical hx Surgical hx   Past Medical History:   Diagnosis Date     ASCUS favor benign 12/2011    neg HPV, repeat Pap + HPV cotesting in 3 years     Chickenpox      Depression      Eating disorder     bulemia- age 18 to 32     Uterovaginal prolapse 10/27/2017      Past Surgical History:   Procedure Laterality Date     ARTHROSCOPIC RECONSTRUCTION ANTERIOR CRUCIATE LIGAMENT  x3    twice on L knee and once on Rt knee     C BREAST AUGMENTATION       wisdom teeth            Medications  Prior to Admission medications    Medication Sig Start Date End Date Taking? Authorizing Provider   acyclovir (ZOVIRAX) 200 MG capsule Take 1 capsule (200 mg) by mouth 2 times daily 8/2/19  Yes Chioma Nesbitt MD       Allergies  No Known Allergies    Family hx Social hx   Family History   Problem Relation Age of Onset     Cancer Paternal Grandmother 60        ovarian     Breast Cancer Paternal Grandmother         stage 4     Cancer Maternal Grandmother         lymphoma     Cancer Maternal Grandfather         lung ,bone      Cardiovascular Paternal Grandfather         MI     Colon Cancer Mother      Gastrointestinal Disease Brother         ulcerative colitis     Melanoma No family hx of       Social History     Tobacco Use     Smoking status: Never Smoker     Smokeless tobacco: Never Used   Substance Use Topics     Alcohol use: Yes     Alcohol/week: 0.0 standard drinks     Comment: Occassional- quit with pregnancy     Drug use: No          REVIEW OF SYSTEMS:  She did not have any recent infections but admits to having fatigue.  Denies any headaches.  Denies any seizures, does not have any cough, shortness of breath or dyspnea on exertion.  No chest pain.  No anemia, no easy bruising.  Had in the past had arthroscopic reconstruction of the anterior cruciate ligament.  Otherwise, no other issues regarding that.  Denies any depression now but was diagnosed with that in the past.  Denies any eye, hearing or skin issues.  Otherwise, a comprehensive review of symptoms was noncontributory.     Examination  /77 (BP Location: Right arm, Patient Position: Sitting, Cuff Size: Adult Regular)   Pulse 93   Temp 97.9  F (36.6  C)   Wt 56.2 kg (123 lb 12.8 oz)   SpO2 100%   BMI 20.60 kg/m     Body mass index is 20.6 kg/m .    Gen- well, NAD, A+Ox3, normal color  Eye- EOMI  ENT- MMM, normal oropharynx  Lym- no palpable lymphadenopathy  CVS- S1, S2 normal, no added sounds, RRR  RS- CTA  Abd- Not distended. Tender at the RUQ. No hepatosplenomegaly.  Extr- pulses good, no SHIVA  MS- hands normal- no clubbing  Neuro- A+Ox3, no asterixis  Skin- no rash or jaundice  Psych- normal mood    Laboratory  Lab Results   Component Value Date     08/19/2019    POTASSIUM 3.7 08/19/2019    CHLORIDE 109 08/19/2019    CO2 27 08/19/2019    BUN 10 08/19/2019    CR 0.60 08/19/2019       Lab Results   Component Value Date    BILITOTAL 0.6 08/19/2019    ALT 21 08/19/2019    AST 12 08/19/2019    ALKPHOS 42 08/19/2019       Lab Results   Component Value Date     ALBUMIN 4.2 08/19/2019    PROTTOTAL 7.9 08/19/2019        Lab Results   Component Value Date    WBC 5.5 08/19/2019    HGB 13.3 08/19/2019    MCV 93 08/19/2019     08/19/2019       No results found for: INR      Radiology    ASSESSMENT AND PLAN:  Incidental finding on images of a liver lesion.  Ms. Hayden was having a workup for abdominal pain, right upper quadrant discomfort and ruptured ovarian cyst on the left.  During that workup on imaging, which was a CT scan, she was found to have a liver lesion which they are not quite sure what it was and that CT of the abdomen and pelvis was read as showing the subtle mass in the inferior right lobe of the liver with size of 2.5 x 2.3 x 3.0 cm.  The impression was that it was not clear, and they recommended an MRI, which was done, and that MRI did show that same lesion, which was in segment 6.  They thought that this lesion has early portal venous phase and is a relatively hypoenhancing structure.  They thought that this does not show any signs of overt features of malignancy, but might represent atypical FNH, hemangioma or adenoma.  They asked this to be reevaluated with another MRI in 3 months.        I had a discussion with the radiologist and we decided that this should be presented at the multidisciplinary tumor conference here at the [facility] and we will get other images of MRI with Eovist as that might clarify.  The radiologist thinks that this is most likely FNH versus adenoma rather than a hemangioma and he looked also at her abdominal ultrasound.        The plan will be to present her at the multidisciplinary tumor conference and at the same time get prior to that an MRI with Eovist.        Besides that, the right upper quadrant abdominal pain, which has not been yet identified what the cause is. This  lesion  Is too small to explain that.  She might have interposition of her colon, between  the diaphragm and liver, (Chilaiditi syndrome ).  Other  upper GI  Problems are possible.  As patient did not have an upper endoscopy, we will do that and check for peptic ulcer disease or other pathology that could explain what that could be from.  The most likely diagnosis, in my opinion, is that this is musculoskeletal discomfort as it is provokable.  She occasionally uses NSAIDs for this.  The plan will be to get an upper endoscopy in the near future and see her here after that to discuss findings with her.        For her other medical issues, she will follow with her gynecologist and her primary care physician.      This was a 45-minute visit of which more than 50% was spent in explaining to the patient what our plan of care was.  We answered all of her questions.      cc:     WILLIAMS Kelly, CNP   [facility]    [address] [place] [address]       Shirlene Wilson MD  Hepatology  [facility]        Shirlene Wilson MD

## 2019-09-23 NOTE — PROGRESS NOTES
[facility]    Hepatology New Patient Visit    Referring provider:  Marin Bishop    CHIEF COMPLAINT AND REASON FOR THE VISIT:  Liver lesion.      HISTORY OF PRESENT ILLNESS:  Ms. Hayden is a 39-year-old female with a past history of ovarian cyst on the left side and she is coming to us for abdominal pain on the right upper quadrant area and history of rectal bleeding and bloating.  She is telling me now that her right upper quadrant abdominal discomfort gets worse with sneezing and coughing.  It is not associated with nausea and vomiting.  She does have bowel movements which she has seen some blood in it.  She had a colonoscopy that was done which was completely normal.  Currently, her weight has remained stable.  Her appetite is good.  Denies any odynophagia or dysphagia.     Medical hx Surgical hx   Past Medical History:   Diagnosis Date     ASCUS favor benign 12/2011    neg HPV, repeat Pap + HPV cotesting in 3 years     Chickenpox      Depression      Eating disorder     bulemia- age 18 to 32     Uterovaginal prolapse 10/27/2017      Past Surgical History:   Procedure Laterality Date     ARTHROSCOPIC RECONSTRUCTION ANTERIOR CRUCIATE LIGAMENT  x3    twice on L knee and once on Rt knee     C BREAST AUGMENTATION       wisdom teeth            Medications  Prior to Admission medications    Medication Sig Start Date End Date Taking? Authorizing Provider   acyclovir (ZOVIRAX) 200 MG capsule Take 1 capsule (200 mg) by mouth 2 times daily 8/2/19  Yes Chioma Nesbitt MD       Allergies  No Known Allergies    Family hx Social hx   Family History   Problem Relation Age of Onset     Cancer Paternal Grandmother 60        ovarian     Breast Cancer Paternal Grandmother         stage 4     Cancer Maternal Grandmother         lymphoma     Cancer Maternal Grandfather         lung ,bone     Cardiovascular Paternal Grandfather         MI     Colon Cancer Mother      Gastrointestinal Disease  Brother         ulcerative colitis     Melanoma No family hx of       Social History     Tobacco Use     Smoking status: Never Smoker     Smokeless tobacco: Never Used   Substance Use Topics     Alcohol use: Yes     Alcohol/week: 0.0 standard drinks     Comment: Occassional- quit with pregnancy     Drug use: No          REVIEW OF SYSTEMS:  She did not have any recent infections but admits to having fatigue.  Denies any headaches.  Denies any seizures, does not have any cough, shortness of breath or dyspnea on exertion.  No chest pain.  No anemia, no easy bruising.  Had in the past had arthroscopic reconstruction of the anterior cruciate ligament.  Otherwise, no other issues regarding that.  Denies any depression now but was diagnosed with that in the past.  Denies any eye, hearing or skin issues.  Otherwise, a comprehensive review of symptoms was noncontributory.     Examination  /77 (BP Location: Right arm, Patient Position: Sitting, Cuff Size: Adult Regular)   Pulse 93   Temp 97.9  F (36.6  C)   Wt 56.2 kg (123 lb 12.8 oz)   SpO2 100%   BMI 20.60 kg/m    Body mass index is 20.6 kg/m .    Gen- well, NAD, A+Ox3, normal color  Eye- EOMI  ENT- MMM, normal oropharynx  Lym- no palpable lymphadenopathy  CVS- S1, S2 normal, no added sounds, RRR  RS- CTA  Abd- Not distended. Tender at the RUQ. No hepatosplenomegaly.  Extr- pulses good, no SHIVA  MS- hands normal- no clubbing  Neuro- A+Ox3, no asterixis  Skin- no rash or jaundice  Psych- normal mood    Laboratory  Lab Results   Component Value Date     08/19/2019    POTASSIUM 3.7 08/19/2019    CHLORIDE 109 08/19/2019    CO2 27 08/19/2019    BUN 10 08/19/2019    CR 0.60 08/19/2019       Lab Results   Component Value Date    BILITOTAL 0.6 08/19/2019    ALT 21 08/19/2019    AST 12 08/19/2019    ALKPHOS 42 08/19/2019       Lab Results   Component Value Date    ALBUMIN 4.2 08/19/2019    PROTTOTAL 7.9 08/19/2019        Lab Results   Component Value Date    WBC 5.5  08/19/2019    HGB 13.3 08/19/2019    MCV 93 08/19/2019     08/19/2019       No results found for: INR      Radiology    ASSESSMENT AND PLAN:  Incidental finding on images of a liver lesion.  Ms. Hayden was having a workup for abdominal pain, right upper quadrant discomfort and ruptured ovarian cyst on the left.  During that workup on imaging, which was a CT scan, she was found to have a liver lesion which they are not quite sure what it was and that CT of the abdomen and pelvis was read as showing the subtle mass in the inferior right lobe of the liver with size of 2.5 x 2.3 x 3.0 cm.  The impression was that it was not clear, and they recommended an MRI, which was done, and that MRI did show that same lesion, which was in segment 6.  They thought that this lesion has early portal venous phase and is a relatively hypoenhancing structure.  They thought that this does not show any signs of overt features of malignancy, but might represent atypical FNH, hemangioma or adenoma.  They asked this to be reevaluated with another MRI in 3 months.        I had a discussion with the radiologist and we decided that this should be presented at the multidisciplinary tumor conference here at the [facility] and we will get other images of MRI with Eovist as that might clarify.  The radiologist thinks that this is most likely FNH versus adenoma rather than a hemangioma and he looked also at her abdominal ultrasound.        The plan will be to present her at the multidisciplinary tumor conference and at the same time get prior to that an MRI with Eovist.        Besides that, the right upper quadrant abdominal pain, which has not been yet identified what the cause is. This  lesion  Is too small to explain that.  She might have interposition of her colon, between  the diaphragm and liver, (Chilaiditi syndrome ). Other  upper GI  Problems are possible.  As patient did not have an upper endoscopy, we will do that and  check for peptic ulcer disease or other pathology that could explain what that could be from.  The most likely diagnosis, in my opinion, is that this is musculoskeletal discomfort as it is provokable.  She occasionally uses NSAIDs for this.  The plan will be to get an upper endoscopy in the near future and see her here after that to discuss findings with her.        For her other medical issues, she will follow with her gynecologist and her primary care physician.      This was a 45-minute visit of which more than 50% was spent in explaining to the patient what our plan of care was.  We answered all of her questions.      cc:     WILLIAMS Kelly, CNP   [facility]    [address] [place] [address]       Shirlene Wilson MD  Hepatology  [facility]       Daily Assessment

## 2019-09-26 ENCOUNTER — HOSPITAL ENCOUNTER (OUTPATIENT)
Dept: MRI IMAGING | Facility: CLINIC | Age: 39
Discharge: HOME | End: 2019-09-26
Attending: INTERNAL MEDICINE | Admitting: INTERNAL MEDICINE
Payer: COMMERCIAL

## 2019-09-26 DIAGNOSIS — R10.11 ABDOMINAL PAIN, RIGHT UPPER QUADRANT: ICD-10-CM

## 2019-09-26 DIAGNOSIS — K76.9 LIVER LESION, RIGHT LOBE: ICD-10-CM

## 2019-09-26 PROCEDURE — 25500064 ZZH RX 255 OP 636: Performed by: INTERNAL MEDICINE

## 2019-09-26 PROCEDURE — 74183 MRI ABD W/O CNTR FLWD CNTR: CPT

## 2019-09-26 PROCEDURE — A9581 GADOXETATE DISODIUM INJ: HCPCS | Performed by: INTERNAL MEDICINE

## 2019-09-26 PROCEDURE — 25000128 H RX IP 250 OP 636: Performed by: INTERNAL MEDICINE

## 2019-09-26 RX ADMIN — SODIUM CHLORIDE 70 ML: 9 INJECTION, SOLUTION INTRAVENOUS at 11:52

## 2019-09-26 RX ADMIN — GADOXETATE DISODIUM 10 ML: 181.43 INJECTION, SOLUTION INTRAVENOUS at 11:52

## 2019-09-30 ENCOUNTER — DOCUMENTATION ONLY (OUTPATIENT)
Dept: CARE COORDINATION | Facility: CLINIC | Age: 39
End: 2019-09-30

## 2019-10-02 ENCOUNTER — TELEPHONE (OUTPATIENT)
Dept: GASTROENTEROLOGY | Facility: CLINIC | Age: 39
End: 2019-10-02

## 2019-10-02 NOTE — TELEPHONE ENCOUNTER
Patient scheduled for EGD    Indication for procedure. Abdominal pain, right upper quadrant; Liver lesion, right lobe    Referring Provider. ALYSSA WANG    ? No     Arrival time verified?  8 AM    Facility location verified? [address] [place] [address]    Instructions given regarding prep and procedure Instructions reviewed    Prep Type NPO    Are you taking any anticoagulants or blood thinners? No     Instructions given? N/a     Electronic implanted devices? Denies     Pre procedure teaching completed? Yes    Transportation from procedure?  policy reviewed. Instructed patient to have someone stay with her for 6 hours post exam    H&P / Pre op physical completed? N/a

## 2019-10-03 DIAGNOSIS — R10.11 ABDOMINAL PAIN, RIGHT UPPER QUADRANT: Primary | ICD-10-CM

## 2019-10-03 DIAGNOSIS — K76.9 LIVER LESION, RIGHT LOBE: ICD-10-CM

## 2019-10-10 ENCOUNTER — HOSPITAL ENCOUNTER (OUTPATIENT)
Facility: AMBULATORY SURGERY CENTER | Age: 39
End: 2019-10-10
Attending: INTERNAL MEDICINE
Payer: COMMERCIAL

## 2019-10-10 VITALS
WEIGHT: 120 LBS | TEMPERATURE: 98.3 F | BODY MASS INDEX: 19.99 KG/M2 | RESPIRATION RATE: 11 BRPM | DIASTOLIC BLOOD PRESSURE: 88 MMHG | HEIGHT: 65 IN | HEART RATE: 110 BPM | OXYGEN SATURATION: 100 % | SYSTOLIC BLOOD PRESSURE: 114 MMHG

## 2019-10-10 DIAGNOSIS — R10.13 DYSPEPSIA: Primary | ICD-10-CM

## 2019-10-10 LAB — UPPER GI ENDOSCOPY: NORMAL

## 2019-10-10 RX ORDER — ONDANSETRON 2 MG/ML
4 INJECTION INTRAMUSCULAR; INTRAVENOUS EVERY 6 HOURS PRN
Status: CANCELLED | OUTPATIENT
Start: 2019-10-10

## 2019-10-10 RX ORDER — NALOXONE HYDROCHLORIDE 0.4 MG/ML
.1-.4 INJECTION, SOLUTION INTRAMUSCULAR; INTRAVENOUS; SUBCUTANEOUS
Status: CANCELLED | OUTPATIENT
Start: 2019-10-10 | End: 2019-10-11

## 2019-10-10 RX ORDER — ONDANSETRON 4 MG/1
4 TABLET, ORALLY DISINTEGRATING ORAL EVERY 6 HOURS PRN
Status: CANCELLED | OUTPATIENT
Start: 2019-10-10

## 2019-10-10 RX ORDER — ONDANSETRON 2 MG/ML
4 INJECTION INTRAMUSCULAR; INTRAVENOUS
Status: DISCONTINUED | OUTPATIENT
Start: 2019-10-10 | End: 2019-10-11 | Stop reason: HOSPADM

## 2019-10-10 RX ORDER — LIDOCAINE 40 MG/G
CREAM TOPICAL
Status: DISCONTINUED | OUTPATIENT
Start: 2019-10-10 | End: 2019-10-11 | Stop reason: HOSPADM

## 2019-10-10 RX ORDER — FENTANYL CITRATE 50 UG/ML
INJECTION, SOLUTION INTRAMUSCULAR; INTRAVENOUS PRN
Status: DISCONTINUED | OUTPATIENT
Start: 2019-10-10 | End: 2019-10-10 | Stop reason: HOSPADM

## 2019-10-10 RX ORDER — FLUMAZENIL 0.1 MG/ML
0.2 INJECTION, SOLUTION INTRAVENOUS
Status: CANCELLED | OUTPATIENT
Start: 2019-10-10 | End: 2019-10-11

## 2019-10-10 ASSESSMENT — MIFFLIN-ST. JEOR: SCORE: 1230.2

## 2019-10-10 NOTE — OR NURSING
Procedure: EGD  Sedation: Conscious sedation (50 mg fentanyl, 3 mcg versed). Post procedure IV site slightly raised, flushes but no blood return noted. Monitor site post procedure for infiltration.  O2: 2-5 LPM NC during procedure  Tolerated: VS stable during and post procedure. Not c/o abdominal or chest pain  Report: Given to recovery RN  Pt to recovery area in stable condition, accompanied by RN    Janice Wilkinson, RN

## 2019-10-10 NOTE — DISCHARGE INSTRUCTIONS
Discharge Instructions after  Upper Endoscopy (EGD)    Activity and Diet  You were given medicine for pain. You may be dizzy or sleepy.  For 24 hours:    Do not drive or use heavy equipment.    Do not make important decisions.    Do not drink any alcohol.  ___ You may return to your regular diet.    Discomfort  You may have a sore throat for 2 to 3 days. It may help to:    Avoid hot liquids for 24 hours.    Use sore throat lozenges.    Gargle as needed with salt water up to 4 times a day. Mix 1 cup of warm water  with 1 teaspoon of salt. Do not swallow.  ___ Your esophagus was dilated (opened) or banded during the exam:    Drink only cool liquids for the rest of the day. Eat a soft diet for the next few days.    You may have a sore chest for 2 to 3 days.    You may take Tylenol (acetaminophen) for pain unless your doctor has told you not to.    Do not take aspirin or ibuprofen (Advil, Motrin) or other NSAIDS  (anti-inflammatory drugs) for ___ days.    Follow-up  ___ We took small tissue samples for study. If you do not have a follow-up visit scheduled,  call your provider s office in 2 weeks for the results.    Other instructions________________________________________________________    When to call us:  Problems are rare. Call right away if you have:    Unusual throat pain or trouble swallowing    Unusual pain in belly or chest that is not relieved by belching or passing air    Black stools (tar-like looking bowel movement)    Temperature above 100.6  F. (37.5  C).    If you vomit blood or have severe pain, go to an emergency room.    If you have questions, call:  Monday to Friday, 7 a.m. to 4:30 p.m.: Endoscopy: [phone] (We may have to call you back)    After hours: Hospital: [phone] (Ask for the GI fellow on call)

## 2019-10-11 LAB — COPATH REPORT: NORMAL

## 2019-10-15 ENCOUNTER — HOSPITAL ENCOUNTER (OUTPATIENT)
Dept: ULTRASOUND IMAGING | Facility: CLINIC | Age: 39
Discharge: HOME | End: 2019-10-15
Attending: OBSTETRICS & GYNECOLOGY | Admitting: OBSTETRICS & GYNECOLOGY
Payer: COMMERCIAL

## 2019-10-15 DIAGNOSIS — N83.202 LEFT OVARIAN CYST: ICD-10-CM

## 2019-10-15 PROCEDURE — 76830 TRANSVAGINAL US NON-OB: CPT

## 2019-10-21 ENCOUNTER — PRE VISIT (OUTPATIENT)
Dept: GASTROENTEROLOGY | Facility: CLINIC | Age: 39
End: 2019-10-21

## 2019-10-21 NOTE — PROGRESS NOTES
Was the patient contacted by phone and reminded of the upcoming visit? message left    Was the patient instructed to bring a current list of all medications to the appointment or instructed to bring in all medication bottles? Yes     Were ordered labs and tests completed prior to the appointment? MRI completed     Patient instructed to arrive early for check-in    Lashanda Wang CMA CMA  10/21/2019 2:26 PM

## 2019-10-23 ENCOUNTER — OFFICE VISIT (OUTPATIENT)
Dept: GASTROENTEROLOGY | Facility: CLINIC | Age: 39
End: 2019-10-23
Attending: INTERNAL MEDICINE
Payer: COMMERCIAL

## 2019-10-23 VITALS
HEART RATE: 83 BPM | WEIGHT: 123.6 LBS | HEIGHT: 65 IN | SYSTOLIC BLOOD PRESSURE: 114 MMHG | BODY MASS INDEX: 20.59 KG/M2 | OXYGEN SATURATION: 97 % | DIASTOLIC BLOOD PRESSURE: 74 MMHG

## 2019-10-23 DIAGNOSIS — R10.11 ABDOMINAL PAIN, RIGHT UPPER QUADRANT: Primary | ICD-10-CM

## 2019-10-23 DIAGNOSIS — K76.9 LIVER LESION, RIGHT LOBE: ICD-10-CM

## 2019-10-23 PROCEDURE — G0463 HOSPITAL OUTPT CLINIC VISIT: HCPCS | Mod: ZF

## 2019-10-23 ASSESSMENT — PAIN SCALES - GENERAL: PAINLEVEL: NO PAIN (0)

## 2019-10-23 ASSESSMENT — MIFFLIN-ST. JEOR: SCORE: 1246.53

## 2019-10-23 NOTE — NURSING NOTE
"Chief Complaint   Patient presents with     RECHECK     Abdominal pain, RUQ, Liver lesion right lobe     Blood pressure 114/74, pulse 83, height 1.651 m (5' 5\"), weight 56.1 kg (123 lb 9.6 oz), SpO2 97 %, not currently breastfeeding.    Patient stated that she has received a Flu shot within a [facility] clinic about 2 weeks ago.    Abbey Cuevas, TATUM    "

## 2019-10-23 NOTE — LETTER
10/23/2019      RE: Germania Hayden  [address]  [facility] [address]       [facility]    Hepatology follow-up    CHIEF COMPLAINT AND REASON FOR THE VISIT:  Liver lesion.      SUBJECTIVE:  Ms. Hayden is a 39-year-old white female with history significant for ovarian cyst on the left side and she came to us for right upper quadrant abdominal discomfort.  She also has history of rectal bleed and in fact had a colonoscopy done which was noncontributory.  She had also imaging, which we ordered here and that was an MRI. It showed  that she had a normal liver with no steatosis, but there was a 2.0 x 2.2 cm lesion which was read as compatible with focal nodular hyperplasia.      Today she came for followup, and in fact this is after we did the MRI and  an EGD which showed only fundic gland polyps.  Since then, her abdominal pain has improved and she is feeling quite well.  She does not have any nausea or vomiting.  She is moving her bowels every day.  Weight is stable, and she is physically very active.     Medical hx Surgical hx   Past Medical History:   Diagnosis Date     ASCUS favor benign 12/2011    neg HPV, repeat Pap + HPV cotesting in 3 years     Chickenpox      Depression      Eating disorder     bulemia- age 18 to 32     Uterovaginal prolapse 10/27/2017      Past Surgical History:   Procedure Laterality Date     ARTHROSCOPIC RECONSTRUCTION ANTERIOR CRUCIATE LIGAMENT  x3    twice on L knee and once on Rt knee     C BREAST AUGMENTATION       ESOPHAGOSCOPY, GASTROSCOPY, DUODENOSCOPY (EGD), COMBINED N/A 10/10/2019    Procedure: ESOPHAGOGASTRODUODENOSCOPY, WITH BIOPSY;  Surgeon: Shirlene Wilson MD;  Location: [facility] OR     wisdom teeth            Medications  Prior to Admission medications    Medication Sig Start Date End Date Taking? Authorizing Provider   acyclovir (ZOVIRAX) 200 MG capsule Take 1 capsule (200 mg) by mouth 2 times daily 8/2/19  Yes Chioma Nesbitt  "MD Anh       Allergies  No Known Allergies    Review of systems  A 10-point review of systems was negative    Examination  /74   Pulse 83   Ht 1.651 m (5' 5\")   Wt 56.1 kg (123 lb 9.6 oz)   SpO2 97%   BMI 20.57 kg/m     Body mass index is 20.57 kg/m .    Gen- well, NAD, A+Ox3, normal color  Lym- no palpable LAD  CVS- RRR  RS- CTA  Abd- Not distended. No hepatosplenomegaly.  Extr- hands normal, no SHIVA  Skin- no rash or jaundice  Neuro- no asterixis  Psych- normal mood    Laboratory  Lab Results   Component Value Date     08/19/2019    POTASSIUM 3.7 08/19/2019    CHLORIDE 109 08/19/2019    CO2 27 08/19/2019    BUN 10 08/19/2019    CR 0.60 08/19/2019       Lab Results   Component Value Date    BILITOTAL 0.6 08/19/2019    ALT 21 08/19/2019    AST 12 08/19/2019    ALKPHOS 42 08/19/2019       Lab Results   Component Value Date    ALBUMIN 4.2 08/19/2019    PROTTOTAL 7.9 08/19/2019        Lab Results   Component Value Date    WBC 5.5 08/19/2019    HGB 13.3 08/19/2019    MCV 93 08/19/2019     08/19/2019       No results found for: INR    Radiology    ASSESSMENT AND PLAN:  Liver lesion.  Ms. Hayden, although she initially came with right upper quadrant abdominal pain which we could not identify a cause for it, incidentally on imaging we found that she had a segment VI liver lesion which was compatible with focal nodular hyperplasia.  She had also an upper endoscopy which was practically noncontributory except for fundic gland polyps.  Her  right upper quadrant abdominal pain improved anyway, and she is currently not symptomatic.  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  She does not need to continue the PPIs and can instead go on antacids like Tums.  She will also follow with her primary care physician for her other healthcare maintenance issues.  It appears that her diagnosis might be " non-ulcer dyspepsia.      This was a 25-minute visit, of which more than 50% was spent in explaining to the patient what our plan of care was.  We answered all of her questions.      cc:     WILLIAMS Kelly, CNP    [facility]    [address] [place]  [address]     Shirlene Wilson MD  Hepatology  [facility]    Shirlene Wilson MD

## 2019-10-23 NOTE — PROGRESS NOTES
[facility]    Hepatology follow-up    CHIEF COMPLAINT AND REASON FOR THE VISIT:  Liver lesion.      SUBJECTIVE:  Ms. Hayden is a 39-year-old white female with history significant for ovarian cyst on the left side and she came to us for right upper quadrant abdominal discomfort.  She also has history of rectal bleed and in fact had a colonoscopy done which was noncontributory.  She had also imaging, which we ordered here and that was an MRI. It showed  that she had a normal liver with no steatosis, but there was a 2.0 x 2.2 cm lesion which was read as compatible with focal nodular hyperplasia.      Today she came for followup, and in fact this is after we did the MRI and  an EGD which showed only fundic gland polyps.  Since then, her abdominal pain has improved and she is feeling quite well.  She does not have any nausea or vomiting.  She is moving her bowels every day.  Weight is stable, and she is physically very active.     Medical hx Surgical hx   Past Medical History:   Diagnosis Date     ASCUS favor benign 12/2011    neg HPV, repeat Pap + HPV cotesting in 3 years     Chickenpox      Depression      Eating disorder     bulemia- age 18 to 32     Uterovaginal prolapse 10/27/2017      Past Surgical History:   Procedure Laterality Date     ARTHROSCOPIC RECONSTRUCTION ANTERIOR CRUCIATE LIGAMENT  x3    twice on L knee and once on Rt knee     C BREAST AUGMENTATION       ESOPHAGOSCOPY, GASTROSCOPY, DUODENOSCOPY (EGD), COMBINED N/A 10/10/2019    Procedure: ESOPHAGOGASTRODUODENOSCOPY, WITH BIOPSY;  Surgeon: Shirlene Wilson MD;  Location: [facility] OR     wisdom teeth            Medications  Prior to Admission medications    Medication Sig Start Date End Date Taking? Authorizing Provider   acyclovir (ZOVIRAX) 200 MG capsule Take 1 capsule (200 mg) by mouth 2 times daily 8/2/19  Yes Chioma Nesbitt MD       Allergies  No Known Allergies    Review of systems  A 10-point review of  "systems was negative    Examination  /74   Pulse 83   Ht 1.651 m (5' 5\")   Wt 56.1 kg (123 lb 9.6 oz)   SpO2 97%   BMI 20.57 kg/m    Body mass index is 20.57 kg/m .    Gen- well, NAD, A+Ox3, normal color  Lym- no palpable LAD  CVS- RRR  RS- CTA  Abd- Not distended. No hepatosplenomegaly.  Extr- hands normal, no SHIVA  Skin- no rash or jaundice  Neuro- no asterixis  Psych- normal mood    Laboratory  Lab Results   Component Value Date     08/19/2019    POTASSIUM 3.7 08/19/2019    CHLORIDE 109 08/19/2019    CO2 27 08/19/2019    BUN 10 08/19/2019    CR 0.60 08/19/2019       Lab Results   Component Value Date    BILITOTAL 0.6 08/19/2019    ALT 21 08/19/2019    AST 12 08/19/2019    ALKPHOS 42 08/19/2019       Lab Results   Component Value Date    ALBUMIN 4.2 08/19/2019    PROTTOTAL 7.9 08/19/2019        Lab Results   Component Value Date    WBC 5.5 08/19/2019    HGB 13.3 08/19/2019    MCV 93 08/19/2019     08/19/2019       No results found for: INR    Radiology    ASSESSMENT AND PLAN:  Liver lesion.  Ms. Hayden, although she initially came with right upper quadrant abdominal pain which we could not identify a cause for it, incidentally on imaging we found that she had a segment VI liver lesion which was compatible with focal nodular hyperplasia.  She had also an upper endoscopy which was practically noncontributory except for fundic gland polyps.  Her  right upper quadrant abdominal pain improved anyway, and she is currently not symptomatic.  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  She does not need to continue the PPIs and can instead go on antacids like Tums.  She will also follow with her primary care physician for her other healthcare maintenance issues.  It appears that her diagnosis might be non-ulcer dyspepsia.      This was a 25-minute visit, of which more than 50% was spent in " explaining to the patient what our plan of care was.  We answered all of her questions.      cc:     WILLIAMS Kelly, CNP    [facility]    [address] [place]  [address]     Shirlene Wilson MD  Hepatology  [facility]

## 2019-11-30 NOTE — PATIENT INSTRUCTIONS
WOUND CARE INSTRUCTIONS   FOR CRYOSURGERY   This area treated with liquid nitrogen should form a blister (areas treated may or may not blister-skin may just turn dark and slough off). You do not need to bandage the area unless a blister forms and breaks (which may be a few days). When the blister breaks, begin daily dressing changes as follows:  1) Clean and dry the area with tap water using clean Q-tip or sterile gauze pad.   2) Apply Polysporin ointment or Bacitracin ointment over entire wound. Do NOT use Neosporin ointment.   3) Cover the wound with a band-aid or sterile non-stick gauze pad and micropore paper tape.   REPEAT THESE INSTRUCTIONS AT LEAST ONCE A DAY UNTIL THE WOUND HAS COMPLETELY HEALED.   It is an old wives tale that a wound heals better when it is exposed to air and allowed to dry out. The wound will heal faster with a better cosmetic result if it is kept moist with ointment and covered with a bandage.   Do not let the wound dry out.   IMPORTANT INFORMATION ON REVERSE SIDE   Supplies Needed:   *Cotton tipped applicators (Q-tips)   *Polysporin ointment or Bacitracin ointment (NOT NEOSPORIN)   *Band-aids, or non stick gauze pads and micropore paper tape   PATIENT INFORMATION   During the healing process you will notice a number of changes. All wounds develop a small halo of redness surrounding the wound. This means healing is occurring. Severe itching with extensive redness usually indicates sensitivity to the ointment or bandage tape used to dress the wound. You should call our office if this develops.   Swelling and/or discoloration around your surgical site is common, particularly when performed around the eye.   All wounds normally drain. The larger the wound the more drainage there will be. After 7-10 days, you will notice the wound beginning to shrink and new skin will begin to grow. The wound is healed when you can see skin has formed over the entire area. A healed wound has a healthy, shiny  look to the surface and is red to dark pink in color to normalize. Wounds may take approximately 4-6 weeks to heal. Larger wounds may take 6-8 weeks. After the wound is healed you may discontinue dressing changes.   You may experience a sensation of tightness as your wound heals. This is normal and will gradually subside.   Your healed wound may be sensitive to temperature changes. This sensitivity improves with time, but if you re having a lot of discomfort, try to avoid temperature extremes.   Patients frequently experience itching after their wound appears to have healed because of the continue healing under the skin. Plain Vaseline will help relieve the itching.          Female

## 2019-12-19 ENCOUNTER — OFFICE VISIT (OUTPATIENT)
Dept: FAMILY MEDICINE | Facility: CLINIC | Age: 39
End: 2019-12-19
Payer: COMMERCIAL

## 2019-12-19 VITALS
WEIGHT: 119.8 LBS | DIASTOLIC BLOOD PRESSURE: 70 MMHG | TEMPERATURE: 97.4 F | SYSTOLIC BLOOD PRESSURE: 100 MMHG | HEART RATE: 80 BPM | RESPIRATION RATE: 16 BRPM | BODY MASS INDEX: 19.94 KG/M2

## 2019-12-19 DIAGNOSIS — F43.23 SITUATIONAL MIXED ANXIETY AND DEPRESSIVE DISORDER: ICD-10-CM

## 2019-12-19 DIAGNOSIS — F33.1 MODERATE RECURRENT MAJOR DEPRESSION (H): Primary | ICD-10-CM

## 2019-12-19 DIAGNOSIS — R53.83 OTHER FATIGUE: ICD-10-CM

## 2019-12-19 LAB
DEPRECATED CALCIDIOL+CALCIFEROL SERPL-MC: 58 UG/L (ref 20–75)
TSH SERPL DL<=0.005 MIU/L-ACNC: 1.1 MU/L (ref 0.4–4)
VIT B12 SERPL-MCNC: 493 PG/ML (ref 193–986)

## 2019-12-19 PROCEDURE — 82306 VITAMIN D 25 HYDROXY: CPT | Performed by: NURSE PRACTITIONER

## 2019-12-19 PROCEDURE — 84443 ASSAY THYROID STIM HORMONE: CPT | Performed by: NURSE PRACTITIONER

## 2019-12-19 PROCEDURE — 99214 OFFICE O/P EST MOD 30 MIN: CPT | Performed by: NURSE PRACTITIONER

## 2019-12-19 PROCEDURE — 36415 COLL VENOUS BLD VENIPUNCTURE: CPT | Performed by: NURSE PRACTITIONER

## 2019-12-19 PROCEDURE — 82607 VITAMIN B-12: CPT | Performed by: NURSE PRACTITIONER

## 2019-12-19 RX ORDER — CHOLECALCIFEROL (VITAMIN D3) 50 MCG
1 TABLET ORAL DAILY
COMMUNITY
End: 2021-10-29

## 2019-12-19 RX ORDER — ESCITALOPRAM OXALATE 5 MG/1
5 TABLET ORAL DAILY
Qty: 30 TABLET | Refills: 3 | Status: SHIPPED | OUTPATIENT
Start: 2019-12-19 | End: 2020-01-21

## 2019-12-19 ASSESSMENT — ANXIETY QUESTIONNAIRES
3. WORRYING TOO MUCH ABOUT DIFFERENT THINGS: SEVERAL DAYS
GAD7 TOTAL SCORE: 11
IF YOU CHECKED OFF ANY PROBLEMS ON THIS QUESTIONNAIRE, HOW DIFFICULT HAVE THESE PROBLEMS MADE IT FOR YOU TO DO YOUR WORK, TAKE CARE OF THINGS AT HOME, OR GET ALONG WITH OTHER PEOPLE: SOMEWHAT DIFFICULT
6. BECOMING EASILY ANNOYED OR IRRITABLE: MORE THAN HALF THE DAYS
2. NOT BEING ABLE TO STOP OR CONTROL WORRYING: MORE THAN HALF THE DAYS
1. FEELING NERVOUS, ANXIOUS, OR ON EDGE: MORE THAN HALF THE DAYS
5. BEING SO RESTLESS THAT IT IS HARD TO SIT STILL: NOT AT ALL
7. FEELING AFRAID AS IF SOMETHING AWFUL MIGHT HAPPEN: MORE THAN HALF THE DAYS

## 2019-12-19 ASSESSMENT — PAIN SCALES - GENERAL: PAINLEVEL: NO PAIN (0)

## 2019-12-19 ASSESSMENT — PATIENT HEALTH QUESTIONNAIRE - PHQ9
SUM OF ALL RESPONSES TO PHQ QUESTIONS 1-9: 14
5. POOR APPETITE OR OVEREATING: MORE THAN HALF THE DAYS

## 2019-12-19 NOTE — PATIENT INSTRUCTIONS
Possible side effects of antidepressants/anxiety meds, including but not limited to GI upset, disrupted sleep, loss of libido, worsening of mood or even possible risk of increased suicidal thoughts.   Often some of these things if not severe will improve after 1-2 weeks on medications but some may not see effects for 3-4 weeks,  if tolerable patients should continue meds and see if there is improvement.  If symptoms are intolerable or for any suicidal thoughts the medication should be stopped immediately and contact the clinic.      These medications should be used for 6-9 months before stopping, to avoid rebound symptoms.   Contact the clinic if having any problems tolerating these medications.  Take the medication daily and do not stop the medication abruptly.    Follow up in 3 weeks either telephone, evisit or in clinic to discuss improvement and whether or not we need to change meds or increase dose.  Follow up sooner if problems.      Please plan to contact the clinic or 24 hour nurse line at any time if you are having any thoughts of self harm.    LONNY Kelly

## 2019-12-19 NOTE — PROGRESS NOTES
Subjective     Germania Hayden is a 39 year old female who presents to clinic today for the following health issues:    HPI     Abnormal Mood Symptoms  Onset: 3 years.  History of depression in the past. Felt better for awhile while going through pregnancies. Took Effexor about 10 years ago. Remembers getting night sweats from it. Also took Prozac. Doesn't remember if it helped or not.     Description:   Depression: YES  Anxiety: YES    Accompanying Signs & Symptoms:  Still participating in activities that you used to enjoy: YES  Fatigue: YES  Irritability: YES  Difficulty concentrating: YES  Changes in appetite: no  Problems with sleep: YES- feels like she could nap every day.   Heart racing/beating fast : YES  Thoughts of hurting yourself or others: none    History:   Recent stress: YES- has a mass in her liver, starting nursing program  Prior depression hospitalization: None  Family history of depression: YES  Family history of anxiety: no     Precipitating factors:   Alcohol/drug use: no     Alleviating factors:  none    Therapies Tried and outcome: Vitamin D and exercise not effective    Is  with 3 kids.  Feels like this has been a long struggle since young adulthood.    Regular periods - but reports moods affects by this but hormone levels in the past have been normal.    Patient Active Problem List   Diagnosis     Vulvar varicose veins     Uterovaginal prolapse     H/O cold sores     Moderate recurrent major depression (H)     Past Surgical History:   Procedure Laterality Date     ARTHROSCOPIC RECONSTRUCTION ANTERIOR CRUCIATE LIGAMENT  x3    twice on L knee and once on Rt knee     C BREAST AUGMENTATION       ESOPHAGOSCOPY, GASTROSCOPY, DUODENOSCOPY (EGD), COMBINED N/A 10/10/2019    Procedure: ESOPHAGOGASTRODUODENOSCOPY, WITH BIOPSY;  Surgeon: Shirlene Wilson MD;  Location:  OR     [facility]         Social History     Tobacco Use     Smoking status: Never Smoker     Smokeless tobacco:  Never Used   Substance Use Topics     Alcohol use: Yes     Alcohol/week: 0.0 standard drinks     Comment: rare     Family History   Problem Relation Age of Onset     Cancer Paternal Grandmother 60        ovarian     Breast Cancer Paternal Grandmother         stage 4     Cancer Maternal Grandmother         lymphoma     Cancer Maternal Grandfather         lung ,bone     Cardiovascular Paternal Grandfather         MI     Mental Illness Brother         schizoaffective disorder     Depression Mother      Colon Polyps Mother      Gastrointestinal Disease Brother         ulcerative colitis     Melanoma No family hx of          Current Outpatient Medications   Medication Sig Dispense Refill     acyclovir (ZOVIRAX) 200 MG capsule Take 1 capsule (200 mg) by mouth 2 times daily 180 capsule 3     Multiple Vitamins-Minerals (MULTIVITAMIN PO)        vitamin D3 (CHOLECALCIFEROL) 2000 units (50 mcg) tablet Take 1 tablet by mouth daily       No Known Allergies  Recent Labs   Lab Test 08/19/19  1605 04/19/18  0900   ALT 21 22   CR 0.60 0.67   GFRESTIMATED >90 >90   GFRESTBLACK >90 >90   POTASSIUM 3.7 4.1      BP Readings from Last 3 Encounters:   12/19/19 100/70   10/23/19 114/74   10/10/19 (P) 90/61    Wt Readings from Last 3 Encounters:   12/19/19 54.3 kg (119 lb 12.8 oz)   10/23/19 56.1 kg (123 lb 9.6 oz)   10/10/19 54.4 kg (120 lb)                    Reviewed and updated as needed this visit by Provider  Meds  Problems         Review of Systems   ROS COMP: Constitutional, HEENT, cardiovascular, pulmonary, GI, , musculoskeletal, neuro, skin, endocrine and psych systems are negative, except as otherwise noted.      Objective    /70 (BP Location: Right arm, Patient Position: Sitting, Cuff Size: Adult Regular)   Pulse 80   Temp 97.4  F (36.3  C) (Tympanic)   Resp 16   Wt 54.3 kg (119 lb 12.8 oz)   LMP 12/14/2019   BMI 19.94 kg/m    Body mass index is 19.94 kg/m .  Physical Exam   GENERAL: healthy, alert and no  distress  PSYCH: mentation appears normal, affect normal/bright, tearful and judgement and insight intact    Diagnostic Test Results:  Labs reviewed in Epic        Assessment & Plan     1. Moderate recurrent major depression (H)   The risks, benefits and treatment options of prescribed medications or other treatments have been discussed with the patient. The patient verbalized their understanding and should call or follow up if no improvement or if they develop further problems.    - escitalopram (LEXAPRO) 5 MG tablet; Take 1 tablet (5 mg) by mouth daily  Dispense: 30 tablet; Refill: 3  - Vitamin D Deficiency  - Vitamin B12  - TSH with free T4 reflex    2. Situational mixed anxiety and depressive disorder     - escitalopram (LEXAPRO) 5 MG tablet; Take 1 tablet (5 mg) by mouth daily  Dispense: 30 tablet; Refill: 3  - Vitamin D Deficiency  - Vitamin B12  - TSH with free T4 reflex    3. Other fatigue  Labs ordered today to rule out other causes of fatigue.    - Vitamin D Deficiency  - Vitamin B12  - TSH with free T4 reflex       Patient Instructions   Possible side effects of antidepressants/anxiety meds, including but not limited to GI upset, disrupted sleep, loss of libido, worsening of mood or even possible risk of increased suicidal thoughts.   Often some of these things if not severe will improve after 1-2 weeks on medications but some may not see effects for 3-4 weeks,  if tolerable patients should continue meds and see if there is improvement.  If symptoms are intolerable or for any suicidal thoughts the medication should be stopped immediately and contact the clinic.      These medications should be used for 6-9 months before stopping, to avoid rebound symptoms.   Contact the clinic if having any problems tolerating these medications.  Take the medication daily and do not stop the medication abruptly.    Follow up in 3 weeks either telephone, evisit or in clinic to discuss improvement and whether or not we need  to change meds or increase dose.  Follow up sooner if problems.      Please plan to contact the clinic or 24 hour nurse line at any time if you are having any thoughts of self harm.    LONNY Kelly        Return in about 4 weeks (around 1/16/2020) for Anxiety/Depression Follow up.    Alexandra Hart NP  [facility]

## 2019-12-20 ASSESSMENT — ANXIETY QUESTIONNAIRES: GAD7 TOTAL SCORE: 11

## 2020-01-03 ENCOUNTER — MYC MEDICAL ADVICE (OUTPATIENT)
Dept: FAMILY MEDICINE | Facility: CLINIC | Age: 40
End: 2020-01-03

## 2020-01-03 DIAGNOSIS — Z11.1 SCREENING EXAMINATION FOR PULMONARY TUBERCULOSIS: Primary | ICD-10-CM

## 2020-01-06 DIAGNOSIS — Z11.1 SCREENING EXAMINATION FOR PULMONARY TUBERCULOSIS: Primary | ICD-10-CM

## 2020-01-06 DIAGNOSIS — Z11.1 SCREENING EXAMINATION FOR PULMONARY TUBERCULOSIS: ICD-10-CM

## 2020-01-06 PROCEDURE — 36415 COLL VENOUS BLD VENIPUNCTURE: CPT | Performed by: NURSE PRACTITIONER

## 2020-01-06 PROCEDURE — 86481 TB AG RESPONSE T-CELL SUSP: CPT | Performed by: NURSE PRACTITIONER

## 2020-01-06 NOTE — TELEPHONE ENCOUNTER
Alexandra,    Patient sends my chart message asking for the TB gold lab test, pended for your approval.  Patient would also like you to addendum her last office visit to be a Health physical as she is going back to college.  Thank you, Kamini MADRIGAL RN

## 2020-01-06 NOTE — TELEPHONE ENCOUNTER
We did not do a physical at last visit.  We addressed mood symptoms.  Can not addend.  LONNY Kelly

## 2020-01-06 NOTE — TELEPHONE ENCOUNTER
Patient notified of lab test ordered and recommendations from PCP via my chart message today.    Breonna FITZPATRICK Rn

## 2020-01-07 LAB
GAMMA INTERFERON BACKGROUND BLD IA-ACNC: 0.02 IU/ML
M TB IFN-G BLD-IMP: NEGATIVE
M TB IFN-G CD4+ BCKGRND COR BLD-ACNC: >10 IU/ML
MITOGEN IGNF BCKGRD COR BLD-ACNC: 0 IU/ML
MITOGEN IGNF BCKGRD COR BLD-ACNC: 0.01 IU/ML

## 2020-01-08 ENCOUNTER — OFFICE VISIT (OUTPATIENT)
Dept: FAMILY MEDICINE | Facility: CLINIC | Age: 40
End: 2020-01-08
Payer: COMMERCIAL

## 2020-01-08 VITALS
HEART RATE: 91 BPM | SYSTOLIC BLOOD PRESSURE: 100 MMHG | TEMPERATURE: 96.9 F | OXYGEN SATURATION: 98 % | DIASTOLIC BLOOD PRESSURE: 68 MMHG | BODY MASS INDEX: 20.52 KG/M2 | WEIGHT: 120.2 LBS | HEIGHT: 64 IN

## 2020-01-08 DIAGNOSIS — F33.1 MODERATE RECURRENT MAJOR DEPRESSION (H): ICD-10-CM

## 2020-01-08 DIAGNOSIS — Z02.0 ENCOUNTER FOR SCHOOL HISTORY AND PHYSICAL EXAMINATION: Primary | ICD-10-CM

## 2020-01-08 PROCEDURE — 99395 PREV VISIT EST AGE 18-39: CPT | Performed by: NURSE PRACTITIONER

## 2020-01-08 PROCEDURE — 99213 OFFICE O/P EST LOW 20 MIN: CPT | Mod: 25 | Performed by: NURSE PRACTITIONER

## 2020-01-08 ASSESSMENT — MIFFLIN-ST. JEOR: SCORE: 1219.22

## 2020-01-08 NOTE — LETTER
January 8, 2020      Germania Hayden  [address]  [place] [id number]        To Whom It May Concern:    Germania Hayden  was seen on 1/8/2020.  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            Sincerely,        WILLIAMS Perez CNP

## 2020-01-08 NOTE — NURSING NOTE
"Initial /68 (BP Location: Right arm, Patient Position: Sitting, Cuff Size: Adult Regular)   Pulse 91   Temp 96.9  F (36.1  C) (Tympanic)   Ht 1.632 m (5' 4.25\")   Wt 54.5 kg (120 lb 3.2 oz)   LMP 12/14/2019   SpO2 98%   BMI 20.47 kg/m   Estimated body mass index is 20.47 kg/m  as calculated from the following:    Height as of this encounter: 1.632 m (5' 4.25\").    Weight as of this encounter: 54.5 kg (120 lb 3.2 oz). .      "

## 2020-01-08 NOTE — PROGRESS NOTES
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    Healthy Habits:    Getting at least 3 servings of Calcium per day:  Yes    Bi-annual eye exam:  Yes    Dental care twice a year:  NO    Sleep apnea or symptoms of sleep apnea:  None    Diet:  Regular (no restrictions)    Frequency of exercise:  2-3 days/week    Duration of exercise:  15-30 minutes    Taking medications regularly:  Yes    Barriers to taking medications:  None    Medication side effects:  None    PHQ-2 Total Score:    Additional concerns today:  No      Additionally, patient was seen on 12/19/2019 for depression. PHQ9 at that time 14. Was started on Lexapro 5mg daily.  Did initially have some side effects included fatigue and nausea however this has passed. She has not seen any change in mood however, and is interested in increasing the dose. No suicidal ideation.    Today's PHQ-2 Score:   PHQ-2 ( 1999 Pfizer) 10/23/2019   Q1: Little interest or pleasure in doing things 0   Q2: Feeling down, depressed or hopeless 0   PHQ-2 Score 0   Q1: Little interest or pleasure in doing things Not at all   Q2: Feeling down, depressed or hopeless Not at all   PHQ-2 Score 0     PHQ-9 SCORE 9/23/2014 12/20/2016 12/19/2019   PHQ-9 Total Score 3 - -   PHQ-9 Total Score - 3 14         Abuse: Current or Past(Physical, Sexual or Emotional)- No  Do you feel safe in your environment? Yes        Social History     Tobacco Use     Smoking status: Never Smoker     Smokeless tobacco: Never Used   Substance Use Topics     Alcohol use: Yes     Alcohol/week: 0.0 standard drinks     Comment: rare     If you drink alcohol do you typically have >3 drinks per day or >7 drinks per week? No    Alcohol Use 1/8/2020   Prescreen: >3 drinks/day or >7 drinks/week? No       Reviewed orders with patient.  Reviewed health maintenance and  updated orders accordingly - Yes      Mammogram not appropriate for this patient based on age.    Pertinent mammograms are reviewed under the imaging tab.  History of abnormal Pap smear: NO - age 30- 65 PAP every 3 years recommended  PAP / HPV Latest Ref Rng & Units 3/29/2019 4/8/2016 6/7/2013   PAP - NIL NIL NIL   PAP DATE - [facility] - - - -   HPV 16 DNA NEG:Negative Negative Negative -   HPV 18 DNA NEG:Negative Negative Negative -   OTHER HR HPV NEG:Negative Negative Negative -     Reviewed and updated as needed this visit by clinical staff  Tobacco  Allergies  Meds  Problems  Med Hx  Surg Hx  Fam Hx  Soc Hx          Reviewed and updated as needed this visit by Provider  Tobacco  Allergies  Meds  Problems  Med Hx  Surg Hx  Fam Hx        Past Medical History:   Diagnosis Date     ASCUS favor benign 12/2011    neg HPV, repeat Pap + HPV cotesting in 3 years     Chickenpox      Depression      Eating disorder     bulemia- age 18 to 32     Uterovaginal prolapse 10/27/2017      Past Surgical History:   Procedure Laterality Date     ARTHROSCOPIC RECONSTRUCTION ANTERIOR CRUCIATE LIGAMENT  x3    twice on L knee and once on Rt knee     C BREAST AUGMENTATION       ESOPHAGOSCOPY, GASTROSCOPY, DUODENOSCOPY (EGD), COMBINED N/A 10/10/2019    Procedure: ESOPHAGOGASTRODUODENOSCOPY, WITH BIOPSY;  Surgeon: Shirlene Wilson MD;  Location: [facility] OR     wisdom teeth         Review of Systems  CONSTITUTIONAL: NEGATIVE for fever, chills, change in weight  INTEGUMENTARU/SKIN: NEGATIVE for worrisome rashes, moles or lesions  EYES: NEGATIVE for vision changes or irritation  ENT: NEGATIVE for ear, mouth and throat problems  RESP: NEGATIVE for significant cough or SOB  BREAST: NEGATIVE for masses, tenderness or discharge  CV: NEGATIVE for chest pain, palpitations or peripheral edema  GI: NEGATIVE for nausea, abdominal pain, heartburn, or change in bowel habits  : NEGATIVE for unusual urinary or vaginal symptoms. Periods  "are regular.  MUSCULOSKELETAL: NEGATIVE for significant arthralgias or myalgia  NEURO: NEGATIVE for weakness, dizziness or paresthesias  PSYCHIATRIC: NEGATIVE for changes in mood or affect     OBJECTIVE:   /68 (BP Location: Right arm, Patient Position: Sitting, Cuff Size: Adult Regular)   Pulse 91   Temp 96.9  F (36.1  C) (Tympanic)   Ht 1.632 m (5' 4.25\")   Wt 54.5 kg (120 lb 3.2 oz)   LMP 12/14/2019   SpO2 98%   BMI 20.47 kg/m    Physical Exam  GENERAL: healthy, alert and no distress  EYES: Eyes grossly normal to inspection, PERRL and conjunctivae and sclerae normal  HENT: ear canals and TM's normal, nose and mouth without ulcers or lesions  NECK: no adenopathy, no asymmetry, masses, or scars and thyroid normal to palpation  RESP: lungs clear to auscultation - no rales, rhonchi or wheezes  BREAST: normal without masses, tenderness or nipple discharge and no palpable axillary masses or adenopathy  CV: regular rate and rhythm, normal S1 S2, no S3 or S4, no murmur, click or rub, no peripheral edema and peripheral pulses strong  ABDOMEN: soft, nontender, no hepatosplenomegaly, no masses and bowel sounds normal  MS: no gross musculoskeletal defects noted, no edema  SKIN: no suspicious lesions or rashes  NEURO: Normal strength and tone, mentation intact and speech normal  PSYCH: mentation appears normal, affect normal/bright    Diagnostic Test Results:  Labs reviewed in Epic  none     ASSESSMENT/PLAN:   1. 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 Letter for the school provided.    2. Moderate recurrent major depression (H)  Side effects have resolved but no change in symptoms. Will increase to 10mg daily.  She will return in 3-4 weeks if still not seeing change in mood, otherwise call for refills when current supply has been used and follow up in 6 months.      COUNSELING:  Reviewed preventive health counseling, as reflected in " "patient instructions       Regular exercise       Healthy diet/nutrition    Estimated body mass index is 20.47 kg/m  as calculated from the following:    Height as of this encounter: 1.632 m (5' 4.25\").    Weight as of this encounter: 54.5 kg (120 lb 3.2 oz).         reports that she has never smoked. She has never used smokeless tobacco.      Counseling Resources:  ATP IV Guidelines  Pooled Cohorts Equation Calculator  Breast Cancer Risk Calculator  FRAX Risk Assessment  ICSI Preventive Guidelines  Dietary Guidelines for Americans, 2010  USDA's MyPlate  ASA Prophylaxis  Lung CA Screening    WILLIAMS Perez [facility]  "

## 2020-01-08 NOTE — PATIENT INSTRUCTIONS
Increase lexapro to 10 mg daily. Can use 2 of the tablets you already have.  May again experience side effects, but should improve after 1-2 weeks.  If 10mg seems effective, we can continue at that dose and you can be seen again in 6 months.  Call for refills.  If no improvement in 3-4 weeks, return for recheck.      Preventive Health Recommendations  Female Ages 26 - 39  Yearly exam:   See your health care provider every year in order to    Review health changes.     Discuss preventive care.      Review your medicines if you your doctor has prescribed any.    Until age 30: Get a Pap test every three years (more often if you have had an abnormal result).    After age 30: Talk to your doctor about whether you should have a Pap test every 3 years or have a Pap test with HPV screening every 5 years.   You do not need a Pap test if your uterus was removed (hysterectomy) and you have not had cancer.  You should be tested each year for STDs (sexually transmitted diseases), if you're at risk.   Talk to your provider about how often to have your cholesterol checked.  If you are at risk for diabetes, you should have a diabetes test (fasting glucose).  Shots: Get a flu shot each year. Get a tetanus shot every 10 years.   Nutrition:     Eat at least 5 servings of fruits and vegetables each day.    Eat whole-grain bread, whole-wheat pasta and brown rice instead of white grains and rice.    Get adequate Calcium and Vitamin D.     Lifestyle    Exercise at least 150 minutes a week (30 minutes a day, 5 days of the week). This will help you control your weight and prevent disease.    Limit alcohol to one drink per day.    No smoking.     Wear sunscreen to prevent skin cancer.    See your dentist every six months for an exam and cleaning.

## 2020-01-21 ENCOUNTER — MYC MEDICAL ADVICE (OUTPATIENT)
Dept: FAMILY MEDICINE | Facility: CLINIC | Age: 40
End: 2020-01-21

## 2020-01-21 DIAGNOSIS — F33.1 MODERATE RECURRENT MAJOR DEPRESSION (H): Primary | ICD-10-CM

## 2020-01-21 RX ORDER — BUPROPION HYDROCHLORIDE 150 MG/1
150 TABLET ORAL EVERY MORNING
Qty: 30 TABLET | Refills: 1 | Status: SHIPPED | OUTPATIENT
Start: 2020-01-21 | End: 2020-02-18

## 2020-01-21 NOTE — TELEPHONE ENCOUNTER
Wellbutrin ordered - wean Lexapro by taking 5 mg daily for 1-2 weeks then stop.    This should be an e visit or telephone encounter in the future.    Have her follow up with me (not Mychart) in 3 weeks for recheck.  Alexandra Hart, FAISALP

## 2020-02-18 ENCOUNTER — E-VISIT (OUTPATIENT)
Dept: FAMILY MEDICINE | Facility: CLINIC | Age: 40
End: 2020-02-18
Payer: COMMERCIAL

## 2020-02-18 DIAGNOSIS — F33.1 MODERATE RECURRENT MAJOR DEPRESSION (H): Primary | ICD-10-CM

## 2020-02-18 RX ORDER — BUPROPION HYDROCHLORIDE 150 MG/1
150 TABLET ORAL EVERY MORNING
Qty: 90 TABLET | Refills: 1 | Status: SHIPPED | OUTPATIENT
Start: 2020-02-18 | End: 2020-08-18

## 2020-02-18 ASSESSMENT — ANXIETY QUESTIONNAIRES
GAD7 TOTAL SCORE: 3
2. NOT BEING ABLE TO STOP OR CONTROL WORRYING: SEVERAL DAYS
1. FEELING NERVOUS, ANXIOUS, OR ON EDGE: NOT AT ALL
4. TROUBLE RELAXING: NOT AT ALL
5. BEING SO RESTLESS THAT IT IS HARD TO SIT STILL: NOT AT ALL
7. FEELING AFRAID AS IF SOMETHING AWFUL MIGHT HAPPEN: NOT AT ALL
6. BECOMING EASILY ANNOYED OR IRRITABLE: SEVERAL DAYS
GAD7 TOTAL SCORE: 3
GAD7 TOTAL SCORE: 3
7. FEELING AFRAID AS IF SOMETHING AWFUL MIGHT HAPPEN: NOT AT ALL
3. WORRYING TOO MUCH ABOUT DIFFERENT THINGS: SEVERAL DAYS

## 2020-02-18 ASSESSMENT — PATIENT HEALTH QUESTIONNAIRE - PHQ9
SUM OF ALL RESPONSES TO PHQ QUESTIONS 1-9: 2
SUM OF ALL RESPONSES TO PHQ QUESTIONS 1-9: 2
10. IF YOU CHECKED OFF ANY PROBLEMS, HOW DIFFICULT HAVE THESE PROBLEMS MADE IT FOR YOU TO DO YOUR WORK, TAKE CARE OF THINGS AT HOME, OR GET ALONG WITH OTHER PEOPLE: NOT DIFFICULT AT ALL

## 2020-02-18 NOTE — PATIENT INSTRUCTIONS
Depression: Tips to Help Yourself    As your healthcare providers help treat your depression, you can also help yourself. Keep in mind that your illness affects you emotionally, physically, mentally, and socially. So full recovery will take time. Take care of your body and your soul, and be patient with yourself as you get better.  Self-care    Educate yourself. Read about treatment and medicine options. If you have the energy, attend local conferences or support groups. Keep a list of useful websites and helpful books and use them as needed. This illness is not your fault. Don t blame yourself for your depression.    Manage early symptoms. If you notice symptoms returning, experience triggers, or identify other factors that may lead to a depressive episode, get help as soon as possible. Ask trusted friends and family to monitor your behavior and let you know if they see anything of concern.    Work with your provider. Find a provider you can trust. Communicate honestly with that person and share information on your treatment for depression and your reaction to medicines.    Be prepared for a crisis. Know what to do if you experience a crisis. Keep the phone number of a crisis hotline and know the location of your community's urgent care centers and the closest emergency department.    Hold off on big decisions. Depression can cloud your judgment. So wait until you feel better before making major life decisions, such as changing jobs, moving, or getting  or .    Be patient. Recovering from depression is a process. Don t be discouraged if it takes some time to feel better.    Keep it simple. Depression saps your energy and concentration. So you won t be able to do all the things you used to do. Set small goals and do what you can.    Be with others. Don t isolate yourself--you ll only feel worse. Try to be with other people. And take part in fun activities when you can. Go to a movie, ballgame,  [employer] service, or social event. Talk openly with people you can trust. And accept help when it s offered.  Take care of your body  People with depression often lose the desire to take care of themselves. That only makes their problems worse. During treatment and afterward, make a point to:    Exercise. It s a great way to take care of your body. And studies have shown that exercise helps fight depression.    Avoid drugs and alcohol. These may ease the pain in the short term. But they ll only make your problems worse in the long run.    Get relief from stress. Ask your healthcare provider for relaxation exercises and techniques to help relieve stress.    Eat right. A balanced and healthy diet helps keep your body healthy.  Date Last Reviewed: 1/1/2017 2000-2019 [employer]. [address] [place] [address], [place] [address]. All rights reserved. This information is not intended as a substitute for professional medical care. Always follow your healthcare professional's instructions.

## 2020-02-19 ASSESSMENT — PATIENT HEALTH QUESTIONNAIRE - PHQ9: SUM OF ALL RESPONSES TO PHQ QUESTIONS 1-9: 2

## 2020-02-19 ASSESSMENT — ANXIETY QUESTIONNAIRES: GAD7 TOTAL SCORE: 3

## 2020-03-01 ENCOUNTER — HEALTH MAINTENANCE LETTER (OUTPATIENT)
Age: 40
End: 2020-03-01

## 2020-03-02 DIAGNOSIS — K76.9 LIVER LESION, RIGHT LOBE: Primary | ICD-10-CM

## 2020-03-05 ENCOUNTER — OFFICE VISIT (OUTPATIENT)
Dept: DERMATOLOGY | Facility: CLINIC | Age: 40
End: 2020-03-05
Payer: COMMERCIAL

## 2020-03-05 VITALS — HEART RATE: 91 BPM | SYSTOLIC BLOOD PRESSURE: 103 MMHG | OXYGEN SATURATION: 99 % | DIASTOLIC BLOOD PRESSURE: 73 MMHG

## 2020-03-05 DIAGNOSIS — D22.9 NEVUS: ICD-10-CM

## 2020-03-05 DIAGNOSIS — L82.1 SEBORRHEIC KERATOSIS: Primary | ICD-10-CM

## 2020-03-05 DIAGNOSIS — D48.5 NEOPLASM OF UNCERTAIN BEHAVIOR OF SKIN: ICD-10-CM

## 2020-03-05 DIAGNOSIS — D18.01 ANGIOMA OF SKIN: ICD-10-CM

## 2020-03-05 DIAGNOSIS — L81.4 LENTIGO: ICD-10-CM

## 2020-03-05 PROCEDURE — 99214 OFFICE O/P EST MOD 30 MIN: CPT | Mod: 25 | Performed by: PHYSICIAN ASSISTANT

## 2020-03-05 PROCEDURE — 11106 INCAL BX SKN SINGLE LES: CPT | Performed by: PHYSICIAN ASSISTANT

## 2020-03-05 PROCEDURE — 88305 TISSUE EXAM BY PATHOLOGIST: CPT | Mod: TC | Performed by: PHYSICIAN ASSISTANT

## 2020-03-05 PROCEDURE — 88342 IMHCHEM/IMCYTCHM 1ST ANTB: CPT | Mod: TC | Performed by: PHYSICIAN ASSISTANT

## 2020-03-05 NOTE — PATIENT INSTRUCTIONS
Wound Care Instructions     FOR SUPERFICIAL WOUNDS     [facility] [phone]    [facility] [phone]                       AFTER 24 HOURS YOU SHOULD REMOVE THE BANDAGE AND BEGIN DAILY DRESSING CHANGES AS FOLLOWS:     1) Remove Dressing.     2) Clean and dry the area with tap water using a Q-tip or sterile gauze pad.     3) Apply Vaseline, Aquaphor, Polysporin ointment or Bacitracin ointment over entire wound.  Do NOT use Neosporin ointment.     4) Cover the wound with a band-aid, or a sterile non-stick gauze pad and micropore paper tape      REPEAT THESE INSTRUCTIONS AT LEAST ONCE A DAY UNTIL THE WOUND HAS COMPLETELY HEALED.    It is an old wives tale that a wound heals better when it is exposed to air and allowed to dry out. The wound will heal faster with a better cosmetic result if it is kept moist with ointment and covered with a bandage.    **Do not let the wound dry out.**      Supplies Needed:      *Cotton tipped applicators (Q-tips)    *Polysporin Ointment or Bacitracin Ointment (NOT NEOSPORIN)    *Band-aids or non-stick gauze pads and micropore paper tape.      PATIENT INFORMATION:    During the healing process you will notice a number of changes. All wounds develop a small halo of redness surrounding the wound.  This means healing is occurring. Severe itching with extensive redness usually indicates sensitivity to the ointment or bandage tape used to dress the wound.  You should call our office if this develops.      Swelling  and/or discoloration around your surgical site is common, particularly when performed around the eye.    All wounds normally drain.  The larger the wound the more drainage there will be.  After 7-10 days, you will notice the wound beginning to shrink and new skin will begin to grow.  The wound is healed when you can see skin has formed over the entire area.  A healed wound has a healthy, shiny look to the surface and is red to dark pink in color  to normalize.  Wounds may take approximately 4-6 weeks to heal.  Larger wounds may take 6-8 weeks.  After the wound is healed you may discontinue dressing changes.    You may experience a sensation of tightness as your wound heals. This is normal and will gradually subside.    Your healed wound may be sensitive to temperature changes. This sensitivity improves with time, but if you re having a lot of discomfort, try to avoid temperature extremes.    Patients frequently experience itching after their wound appears to have healed because of the continue healing under the skin.  Plain Vaseline will help relieve the itching.        POSSIBLE COMPLICATIONS    BLEEDIN. Leave the bandage in place.  2. Use tightly rolled up gauze or a cloth to apply direct pressure over the bandage for 30  minutes.  3. Reapply pressure for an additional 30 minutes if necessary  4. Use additional gauze and tape to maintain pressure once the bleeding has stopped.

## 2020-03-05 NOTE — PROGRESS NOTES
HPI:   Chief complaints: Germania Hayden is a 39 year old female who presents for Full skin cancer screening to rule out skin cancer   Last Skin Exam: 1 yr ago      1st Baseline: no  Personal HX of Skin Cancer: no   Personal HX of Malignant Melanoma: no   Family HX of Skin Cancer / Malignant Melanoma: Cousin and aunt with NMSC diagnosed in 30s.   Personal HX of Atypical Moles:   no  Risk factors: Sun exposure and burns in the past; tanning bed use and still uses tanning beds on occasion.  New / Changing lesions: mole on her left lower leg that seems to be getting darker, mole on right hairline, and some scabbing on her back.   Social History: lives in [place]. Has 3 kids - 7, 5, and 3 years old. 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   On review of systems, there are no further skin complaints, patient is feeling otherwise well.  See patient intake sheet.  ROS of the following were done and are negative: Constitutional, Eyes, Ears, Nose,   Mouth, Throat, Cardiovascular, Respiratory, GI, Genitourinary, Musculoskeletal,   Psychiatric, Endocrine, Allergic/Immunologic.    This document serves as a record of the services and decisions personally performed and made by Michelle Elizondo, MS, PA-C. It was created on her behalf by Stacy Hinkle, a trained medical scribe. The creation of this document is based on the provider's statements to the medical scribe.  Stacy Hinkle 12:47 PM March 5, 2020    PHYSICAL EXAM:   /73   Pulse 91   SpO2 99%   Skin exam performed as follows: Type 2 skin. Mood appropriate  Alert and Oriented X 3. Well developed, well nourished in no distress.  General appearance: Normal  Head including face: Normal  Eyes: conjunctiva and lids: Normal  Mouth: Lips, teeth, gums: Normal  Neck: Normal  Chest-breast/axillae: Normal  Back: Normal  Spleen and liver: Normal  Cardiovascular: Exam of peripheral vascular  system by observation for swelling, varicosities, edema: Normal  Genitalia: groin, buttocks: Normal  Extremities: digits/nails (clubbing): Normal  Eccrine and Apocrine glands: Normal  Right upper extremity: Normal  Left upper extremity: Normal  Right lower extremity: Normal  Left lower extremity: Normal  Skin: Scalp and body hair: See below    Pt deferred exam of breasts, groin, buttocks: No    Other physical findings:  1. Multiple pigmented macules on extremities and trunk  2. Multiple pigmented macules on face, trunk and extremities  3. Multiple vascular papules on trunk, arms and legs  4. Multiple scattered keratotic plaques  5. 4mm tan papule with grey line on left shin        Except as noted above, no other signs of skin cancer or melanoma.     ASSESSMENT/PLAN:   Benign Full skin cancer screening today. . Patient with history of none  Advised on monthly self exams and 1 year  Patient Education: Appropriate brochures given.    1. Multiple benign appearing nevi on arms, legs and trunk. Discussed ABCDEs of melanoma and sunscreen.   2. Multiple lentigos on arms, legs and trunk. Advised benign, no treatment needed.  3. Multiple scattered angiomas. Advised benign, no treatment needed.   4. Seborrheic keratosis on arms, legs and trunk. Advised benign, no treatment needed.  5. R/o atypical nevus on left shin. Incisional biopsy performed.  Area cleaned and anesthetized with 1% lidocaine with epinephrine.  Dermablade used to remove the lesion and sent to pathology. Bleeding was cauterized. Pt tolerated procedure well with no complications.   6. Germania to follow up with Primary Care provider regarding elevated blood pressure.            Follow-up: yearly FSE / PRN sooner    1.) Patient was asked about new and changing moles. YES  2.) Patient received a complete physical skin examination: YES  3.) Patient was counseled to perform a monthly self skin examination: YES  Scribed By: Michelle Elizondo, MS, PAAuroraC

## 2020-03-05 NOTE — LETTER
3/5/2020         RE: Germania Hayden  [address]  [facility] [address]        Dear Colleague,    Thank you for referring your patient, Germania Hayden, to the [facility]. Please see a copy of my visit note below.    HPI:   Chief complaints: Germania Hayden is a 39 year old female who presents for Full skin cancer screening to rule out skin cancer   Last Skin Exam: 1 yr ago      1st Baseline: no  Personal HX of Skin Cancer: no   Personal HX of Malignant Melanoma: no   Family HX of Skin Cancer / Malignant Melanoma: Cousin and aunt with NMSC diagnosed in 30s.   Personal HX of Atypical Moles:   no  Risk factors: Sun exposure and burns in the past; tanning bed use and still uses tanning beds on occasion.  New / Changing lesions: mole on her left lower leg that seems to be getting darker, mole on right hairline, and some scabbing on her back.   Social History: lives in [place]. Has 3 kids - 7, 5, and 3 years old. 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   On review of systems, there are no further skin complaints, patient is feeling otherwise well.  See patient intake sheet.  ROS of the following were done and are negative: Constitutional, Eyes, Ears, Nose,   Mouth, Throat, Cardiovascular, Respiratory, GI, Genitourinary, Musculoskeletal,   Psychiatric, Endocrine, Allergic/Immunologic.    This document serves as a record of the services and decisions personally performed and made by Michelle Elizondo, MS, PA-C. It was created on her behalf by Stacy Hinkle, a trained medical scribe. The creation of this document is based on the provider's statements to the medical scribe.  Stacy Hinkle 12:47 PM March 5, 2020    PHYSICAL EXAM:   /73   Pulse 91   SpO2 99%   Skin exam performed as follows: Type 2 skin. Mood appropriate  Alert and Oriented X 3. Well developed, well nourished in no distress.  General  appearance: Normal  Head including face: Normal  Eyes: conjunctiva and lids: Normal  Mouth: Lips, teeth, gums: Normal  Neck: Normal  Chest-breast/axillae: Normal  Back: Normal  Spleen and liver: Normal  Cardiovascular: Exam of peripheral vascular system by observation for swelling, varicosities, edema: Normal  Genitalia: groin, buttocks: Normal  Extremities: digits/nails (clubbing): Normal  Eccrine and Apocrine glands: Normal  Right upper extremity: Normal  Left upper extremity: Normal  Right lower extremity: Normal  Left lower extremity: Normal  Skin: Scalp and body hair: See below    Pt deferred exam of breasts, groin, buttocks: No    Other physical findings:  1. Multiple pigmented macules on extremities and trunk  2. Multiple pigmented macules on face, trunk and extremities  3. Multiple vascular papules on trunk, arms and legs  4. Multiple scattered keratotic plaques  5. 4mm tan papule with grey line on left shin        Except as noted above, no other signs of skin cancer or melanoma.     ASSESSMENT/PLAN:   Benign Full skin cancer screening today. . Patient with history of none  Advised on monthly self exams and 1 year  Patient Education: Appropriate brochures given.    1. Multiple benign appearing nevi on arms, legs and trunk. Discussed ABCDEs of melanoma and sunscreen.   2. Multiple lentigos on arms, legs and trunk. Advised benign, no treatment needed.  3. Multiple scattered angiomas. Advised benign, no treatment needed.   4. Seborrheic keratosis on arms, legs and trunk. Advised benign, no treatment needed.  5. R/o atypical nevus on left shin. Incisional biopsy performed.  Area cleaned and anesthetized with 1% lidocaine with epinephrine.  Dermablade used to remove the lesion and sent to pathology. Bleeding was cauterized. Pt tolerated procedure well with no complications.   6. Germania to follow up with Primary Care provider regarding elevated blood pressure.            Follow-up: yearly FSE / PRN sooner    1.)  Patient was asked about new and changing moles. YES  2.) Patient received a complete physical skin examination: YES  3.) Patient was counseled to perform a monthly self skin examination: YES  Scribed By: Michelle Elizondo MS, PAAuroraC        Again, thank you for allowing me to participate in the care of your patient.        Sincerely,        Michelle Elizondo PA-C

## 2020-03-09 LAB — COPATH REPORT: NORMAL

## 2020-03-10 ENCOUNTER — HOSPITAL ENCOUNTER (OUTPATIENT)
Dept: MRI IMAGING | Facility: CLINIC | Age: 40
Discharge: HOME | End: 2020-03-10
Attending: INTERNAL MEDICINE | Admitting: INTERNAL MEDICINE
Payer: COMMERCIAL

## 2020-03-10 DIAGNOSIS — K76.9 LIVER LESION, RIGHT LOBE: ICD-10-CM

## 2020-03-10 PROCEDURE — 25500064 ZZH RX 255 OP 636: Performed by: INTERNAL MEDICINE

## 2020-03-10 PROCEDURE — A9581 GADOXETATE DISODIUM INJ: HCPCS | Performed by: INTERNAL MEDICINE

## 2020-03-10 PROCEDURE — 74183 MRI ABD W/O CNTR FLWD CNTR: CPT

## 2020-03-10 RX ADMIN — GADOXETATE DISODIUM 8 ML: 181.43 INJECTION, SOLUTION INTRAVENOUS at 13:25

## 2020-08-06 DIAGNOSIS — Z86.19 HISTORY OF COLD SORES: ICD-10-CM

## 2020-08-06 RX ORDER — ACYCLOVIR 200 MG/1
200 CAPSULE ORAL 2 TIMES DAILY
Qty: 180 CAPSULE | Refills: 0 | Status: SHIPPED | OUTPATIENT
Start: 2020-08-06 | End: 2020-11-12

## 2020-08-18 ENCOUNTER — OFFICE VISIT (OUTPATIENT)
Dept: FAMILY MEDICINE | Facility: CLINIC | Age: 40
End: 2020-08-18
Payer: COMMERCIAL

## 2020-08-18 VITALS
DIASTOLIC BLOOD PRESSURE: 68 MMHG | OXYGEN SATURATION: 98 % | SYSTOLIC BLOOD PRESSURE: 104 MMHG | WEIGHT: 121.8 LBS | BODY MASS INDEX: 20.74 KG/M2 | TEMPERATURE: 97.4 F | HEART RATE: 108 BPM | RESPIRATION RATE: 16 BRPM

## 2020-08-18 DIAGNOSIS — F43.9 STRESS: ICD-10-CM

## 2020-08-18 DIAGNOSIS — Z20.822 SUSPECTED COVID-19 VIRUS INFECTION: ICD-10-CM

## 2020-08-18 DIAGNOSIS — L29.89 OTHER PRURITUS: ICD-10-CM

## 2020-08-18 DIAGNOSIS — L25.9 CONTACT DERMATITIS, UNSPECIFIED CONTACT DERMATITIS TYPE, UNSPECIFIED TRIGGER: Primary | ICD-10-CM

## 2020-08-18 PROCEDURE — 99214 OFFICE O/P EST MOD 30 MIN: CPT | Performed by: NURSE PRACTITIONER

## 2020-08-18 PROCEDURE — U0003 INFECTIOUS AGENT DETECTION BY NUCLEIC ACID (DNA OR RNA); SEVERE ACUTE RESPIRATORY SYNDROME CORONAVIRUS 2 (SARS-COV-2) (CORONAVIRUS DISEASE [COVID-19]), AMPLIFIED PROBE TECHNIQUE, MAKING USE OF HIGH THROUGHPUT TECHNOLOGIES AS DESCRIBED BY CMS-2020-01-R: HCPCS | Performed by: NURSE PRACTITIONER

## 2020-08-18 RX ORDER — HYDROXYZINE HYDROCHLORIDE 25 MG/1
25 TABLET, FILM COATED ORAL 3 TIMES DAILY PRN
Qty: 12 TABLET | Refills: 0 | Status: SHIPPED | OUTPATIENT
Start: 2020-08-18 | End: 2021-10-29

## 2020-08-18 RX ORDER — PREDNISONE 20 MG/1
TABLET ORAL
Qty: 20 TABLET | Refills: 0 | Status: SHIPPED | OUTPATIENT
Start: 2020-08-18 | End: 2021-10-29

## 2020-08-18 ASSESSMENT — ANXIETY QUESTIONNAIRES
2. NOT BEING ABLE TO STOP OR CONTROL WORRYING: NOT AT ALL
3. WORRYING TOO MUCH ABOUT DIFFERENT THINGS: SEVERAL DAYS
7. FEELING AFRAID AS IF SOMETHING AWFUL MIGHT HAPPEN: NOT AT ALL
IF YOU CHECKED OFF ANY PROBLEMS ON THIS QUESTIONNAIRE, HOW DIFFICULT HAVE THESE PROBLEMS MADE IT FOR YOU TO DO YOUR WORK, TAKE CARE OF THINGS AT HOME, OR GET ALONG WITH OTHER PEOPLE: NOT DIFFICULT AT ALL
5. BEING SO RESTLESS THAT IT IS HARD TO SIT STILL: NOT AT ALL
6. BECOMING EASILY ANNOYED OR IRRITABLE: SEVERAL DAYS
GAD7 TOTAL SCORE: 3
1. FEELING NERVOUS, ANXIOUS, OR ON EDGE: SEVERAL DAYS

## 2020-08-18 ASSESSMENT — PATIENT HEALTH QUESTIONNAIRE - PHQ9
SUM OF ALL RESPONSES TO PHQ QUESTIONS 1-9: 2
5. POOR APPETITE OR OVEREATING: NOT AT ALL

## 2020-08-18 ASSESSMENT — ENCOUNTER SYMPTOMS
FEVER: 0
FATIGUE: 0
CONSTITUTIONAL NEGATIVE: 1

## 2020-08-18 NOTE — PATIENT INSTRUCTIONS
Contact Dermatitis:  1. Remove irritant and cleanse skin thoroughly with soap and water.  2. For itching, you can use Benadryl. This may cause drowsiness.  3. Other treatments to help with symptom relief: oatmeal baths, calamine lotion, vinegar in water 50:50 solution.  4. Take prednisone as prescribed.  5. Take atarax as needed for itching or anxiety.    COVID testing done in office. We will call you with the results in 2-5 business days.

## 2020-08-18 NOTE — PROGRESS NOTES
Subjective     Germania Hayden is a 40 year old female who presents to clinic today for the following health issues:    HPI       Rash  Onset: x 10 days    Description:   Location: Started on neck, rt ear, arms, right hip. spreading  Character: round, blotchy, raised, painful, burning, red  Itching (Pruritis): YES and burning    Progression of Symptoms:  worsening    Accompanying Signs & Symptoms:  Fever: no   Body aches or joint pain: no   Sore throat symptoms: no   Recent cold symptoms: no     History:   Previous similar rash: no     Precipitating factors:   Exposure to similar rash: no   New exposures: possibly clothes detergant, grasses and trees. Patient was on medrol dos pack for poison ivy and it did not help.  Recent travel: YES- [place]    Alleviating factors:  nothing    Therapies Tried and outcome: Claritin, benadryl, calamine lotion, medrol dos pack( 6 days)- did not help.    Additional provider notes: Was looking for Milkweed for her butterflies in the field. Unsure if she was exposed to poison ivy or if she had a reaction to milkweed. Then went out of town to parent's house. Rash developed while she was there. She also was looking for milkweed at her parent's house as well. She went to an acute care clinic and was prescribed a medrol dose pack which she states initially helped symptoms but symptoms returned and worsened after she finished medication.      Stress/anxiety: states they are trying to decide whether to move closer to aging parents. Decision has been stressful and they still don't know yet what they are going to do. COVID pandemic has made it a little more stressful.     Patient Active Problem List   Diagnosis     Vulvar varicose veins     Uterovaginal prolapse     H/O cold sores     Moderate recurrent major depression (H)     Past Surgical History:   Procedure Laterality Date     ARTHROSCOPIC RECONSTRUCTION ANTERIOR CRUCIATE LIGAMENT  x3    twice on L knee and once on Rt knee     C BREAST  AUGMENTATION       ESOPHAGOSCOPY, GASTROSCOPY, DUODENOSCOPY (EGD), COMBINED N/A 10/10/2019    Procedure: ESOPHAGOGASTRODUODENOSCOPY, WITH BIOPSY;  Surgeon: Shirlene Wilson MD;  Location: [facility] OR     wisdom teeth         Social History     Tobacco Use     Smoking status: Never Smoker     Smokeless tobacco: Never Used   Substance Use Topics     Alcohol use: Yes     Alcohol/week: 0.0 standard drinks     Comment: rare     Family History   Problem Relation Age of Onset     Cancer Paternal Grandmother 60        ovarian     Breast Cancer Paternal Grandmother         stage 4     Cancer Maternal Grandmother         lymphoma     Cancer Maternal Grandfather         lung ,bone     Cardiovascular Paternal Grandfather         MI     Mental Illness Brother         schizoaffective disorder     Depression Mother      Colon Polyps Mother      Gastrointestinal Disease Brother         ulcerative colitis     Melanoma No family hx of          Current Outpatient Medications   Medication Sig Dispense Refill     acyclovir (ZOVIRAX) 200 MG capsule Take 1 capsule (200 mg) by mouth 2 times daily Due for office visit for further refills: [phone] 180 capsule 0     hydrOXYzine (ATARAX) 25 MG tablet Take 1 tablet (25 mg) by mouth 3 times daily as needed for itching 12 tablet 0     predniSONE (DELTASONE) 20 MG tablet Take 3 tabs by mouth daily x 3 days, then 2 tabs daily x 3 days, then 1 tab daily x 3 days, then 1/2 tab daily x 3 days. 20 tablet 0     Multiple Vitamins-Minerals (MULTIVITAMIN PO)        vitamin D3 (CHOLECALCIFEROL) 2000 units (50 mcg) tablet Take 1 tablet by mouth daily       No Known Allergies  BP Readings from Last 3 Encounters:   08/18/20 104/68   03/05/20 103/73   01/08/20 100/68    Wt Readings from Last 3 Encounters:   08/18/20 55.2 kg (121 lb 12.8 oz)   01/08/20 54.5 kg (120 lb 3.2 oz)   12/19/19 54.3 kg (119 lb 12.8 oz)                    Reviewed and updated as needed this visit by Provider  Yelena   Allergies  Meds  Problems  Med Hx  Surg Hx  Fam Hx         Review of Systems   Constitutional: Negative.  Negative for fatigue and fever.   Skin: Positive for rash (pruritic rash to neck, face, arms, right hip/flank, legs).            Objective    /68   Pulse 108   Temp 97.4  F (36.3  C) (Tympanic)   Resp 16   Wt 55.2 kg (121 lb 12.8 oz)   LMP 08/11/2020 (Exact Date)   SpO2 98%   BMI 20.74 kg/m    Body mass index is 20.74 kg/m .  Physical Exam  Vitals signs and nursing note reviewed.   Constitutional:       General: She is not in acute distress.     Appearance: Normal appearance. She is normal weight. She is not ill-appearing or toxic-appearing.   Skin:     Comments: Diffuse rash in various stages (some papular, vesicular, some flaking)   Neurological:      Mental Status: She is alert.            Diagnostic Test Results:  Labs reviewed in Epic        Assessment & Plan     1. Contact dermatitis, unspecified contact dermatitis type, unspecified trigger  She likely was not treated long enough last time for rash and had a rebound reaction. Prednisone taper prescribed. Side effects, risks and benefits of medication were discussed with patient. Discussed OTC recommendations for symptom mgmt.     - predniSONE (DELTASONE) 20 MG tablet; Take 3 tabs by mouth daily x 3 days, then 2 tabs daily x 3 days, then 1 tab daily x 3 days, then 1/2 tab daily x 3 days.  Dispense: 20 tablet; Refill: 0    2. Other pruritus  Atarax prescribed for pruritis as well as for it's benefit with anxiety.     - hydrOXYzine (ATARAX) 25 MG tablet; Take 1 tablet (25 mg) by mouth 3 times daily as needed for itching  Dispense: 12 tablet; Refill: 0    3. Stress  Patient has a lot of stress in life currently. Atarax PRN for anxiety as patient reports it should only be temporary stress.    4. Suspected COVID-19 virus infection  Patient concerned about possible COVID rash. Requested PCR testing in office.    - Symptomatic COVID-19 Virus  (Coronavirus) by PCR; Future       Patient Instructions   Contact Dermatitis:  1. Remove irritant and cleanse skin thoroughly with soap and water.  2. For itching, you can use Benadryl. This may cause drowsiness.  3. Other treatments to help with symptom relief: oatmeal baths, calamine lotion, vinegar in water 50:50 solution.  4. Take prednisone as prescribed.  5. Take atarax as needed for itching or anxiety.    COVID testing done in office. We will call you with the results in 2-5 business days.       Return if symptoms worsen or fail to improve.    WILLIAMS [facility]reto [facility]

## 2020-08-19 LAB
SARS-COV-2 RNA SPEC QL NAA+PROBE: NOT DETECTED
SPECIMEN SOURCE: NORMAL

## 2020-08-19 ASSESSMENT — ANXIETY QUESTIONNAIRES: GAD7 TOTAL SCORE: 3

## 2020-11-12 DIAGNOSIS — Z86.19 HISTORY OF COLD SORES: ICD-10-CM

## 2020-11-12 RX ORDER — ACYCLOVIR 200 MG/1
200 CAPSULE ORAL 2 TIMES DAILY
Qty: 180 CAPSULE | Refills: 0 | Status: SHIPPED | OUTPATIENT
Start: 2020-11-12 | End: 2021-03-04

## 2020-11-12 NOTE — TELEPHONE ENCOUNTER
Pending Prescriptions:                       Disp   Refills    acyclovir (ZOVIRAX) 200 MG capsule        180 ca*0            Sig: Take 1 capsule (200 mg) by mouth 2 times daily           Due for office visit for further refills:           [phone]    Patients OB who has been filling it said that she has a PCP she should be getting it through her.       [facility] [address] [facility] [address] [facility] [address]  Phone: [phone] Fax: [phone]    Kanchan David on 11/12/2020 at 9:13 AM

## 2020-12-14 ENCOUNTER — HEALTH MAINTENANCE LETTER (OUTPATIENT)
Age: 40
End: 2020-12-14

## 2021-01-14 ENCOUNTER — OFFICE VISIT (OUTPATIENT)
Dept: FAMILY MEDICINE | Facility: CLINIC | Age: 41
End: 2021-01-14
Payer: COMMERCIAL

## 2021-01-14 VITALS
HEART RATE: 96 BPM | HEIGHT: 65 IN | WEIGHT: 124.7 LBS | RESPIRATION RATE: 16 BRPM | DIASTOLIC BLOOD PRESSURE: 68 MMHG | OXYGEN SATURATION: 100 % | BODY MASS INDEX: 20.78 KG/M2 | TEMPERATURE: 97.5 F | SYSTOLIC BLOOD PRESSURE: 102 MMHG

## 2021-01-14 DIAGNOSIS — N64.4 BREAST PAIN, LEFT: Primary | ICD-10-CM

## 2021-01-14 DIAGNOSIS — N63.0 FIBROUS BREAST LUMPS: ICD-10-CM

## 2021-01-14 PROCEDURE — 99213 OFFICE O/P EST LOW 20 MIN: CPT | Performed by: NURSE PRACTITIONER

## 2021-01-14 RX ORDER — BUPROPION HYDROCHLORIDE 150 MG/1
TABLET ORAL
COMMUNITY
Start: 2020-11-12 | End: 2021-04-22

## 2021-01-14 ASSESSMENT — MIFFLIN-ST. JEOR: SCORE: 1242.55

## 2021-01-14 NOTE — PROGRESS NOTES
"  Assessment & Plan     Breast pain, left     - MA Diagnostic w Implants Bilateral; Future  - US Breast Left Complete 4 Quadrants; Future    Fibrous breast lumps     - MA Diagnostic w Implants Bilateral; Future  - US Breast Left Complete 4 Quadrants; Future                    See Patient Instructions    Return if symptoms worsen or fail to improve.    Alexandra Hart NP  [facility] CONCHITA Solo is a 40 year old who presents to clinic today for the following health issues     HPI     Breast pain      Duration: was dx with a fibroadenoma in the left breast approx 4 years ago.     Description (location/character/radiation): left breast    Intensity:  Mild/moderate.    Accompanying signs and symptoms: states that it is pain/tenderness - states that around certain times of the month it is more pronounced - today it is not that bad. Denies a lump or mass that she can find.    History (similar episodes/previous evaluation): hx fibroadenoma in the left breast . States that she did have imaging uploaded into her chart for this in the past from [facility].     Precipitating or alleviating factors: None    Therapies tried and outcome: None     History of mammogram and ultrasound with biopsy in 2017 - pathology showed fibroadenoma.  Recommended routine follow up at age 42.    Family history + for PGM ovarian cancer and breast cancer in 60's    Normal paps UTD.  Has implants bilateral.  Reports implants have \"never felt right\"  No leakage or fevers or warmth at site.     Review of Systems   Constitutional, HEENT, cardiovascular, pulmonary, GI, , musculoskeletal, neuro, skin, endocrine and psych systems are negative, except as otherwise noted.      Objective    There were no vitals taken for this visit.  There is no height or weight on file to calculate BMI.  Physical Exam   GENERAL: healthy, alert and no distress  BREAST: no palpable axillary masses or adenopathy and no masses, left " breast slightly large in size and lower. Implants intact, left breast with fibrous changes left and small pea size mass X 2 3 and 4 o clock.

## 2021-01-28 ENCOUNTER — ANCILLARY PROCEDURE (OUTPATIENT)
Dept: MAMMOGRAPHY | Facility: CLINIC | Age: 41
End: 2021-01-28
Attending: NURSE PRACTITIONER
Payer: COMMERCIAL

## 2021-01-28 ENCOUNTER — ANCILLARY PROCEDURE (OUTPATIENT)
Dept: ULTRASOUND IMAGING | Facility: CLINIC | Age: 41
End: 2021-01-28
Attending: NURSE PRACTITIONER
Payer: COMMERCIAL

## 2021-01-28 DIAGNOSIS — N64.4 BREAST PAIN, LEFT: ICD-10-CM

## 2021-01-28 DIAGNOSIS — N63.0 FIBROUS BREAST LUMPS: ICD-10-CM

## 2021-01-28 PROCEDURE — 76642 ULTRASOUND BREAST LIMITED: CPT | Mod: LT | Performed by: RADIOLOGY

## 2021-01-28 PROCEDURE — 77066 DX MAMMO INCL CAD BI: CPT | Performed by: RADIOLOGY

## 2021-01-28 PROCEDURE — G0279 TOMOSYNTHESIS, MAMMO: HCPCS | Performed by: RADIOLOGY

## 2021-03-01 DIAGNOSIS — Z86.19 HISTORY OF COLD SORES: ICD-10-CM

## 2021-03-01 NOTE — TELEPHONE ENCOUNTER
"Requested Prescriptions   Pending Prescriptions Disp Refills     acyclovir (ZOVIRAX) 200 MG capsule [Pharmacy Med Name: Acyclovir 200 MG Oral Capsule] 180 capsule 0     Sig: TAKE 1 CAPSULE BY MOUTH TWICE DAILY . APPOINTMENT REQUIRED FOR FUTURE REFILLS       Antivirals for Herpes Protocol Failed - 3/1/2021  3:31 PM        Failed - Normal serum creatinine on file in past 12 months     Recent Labs   Lab Test 08/19/19  1605   CR 0.60       Ok to refill medication if creatinine is low          Passed - Patient is age 12 or older        Passed - Recent (12 mo) or future (30 days) visit within the authorizing provider's specialty     Patient has had an office visit with the authorizing provider or a provider within the authorizing providers department within the previous 12 mos or has a future within next 30 days. See \"Patient Info\" tab in inbasket, or \"Choose Columns\" in Meds & Orders section of the refill encounter.              Passed - Medication is active on med list             "

## 2021-03-02 ENCOUNTER — ALLIED HEALTH/NURSE VISIT (OUTPATIENT)
Dept: FAMILY MEDICINE | Facility: CLINIC | Age: 41
End: 2021-03-02
Payer: COMMERCIAL

## 2021-03-02 DIAGNOSIS — Z11.1 SCREENING EXAMINATION FOR PULMONARY TUBERCULOSIS: Primary | ICD-10-CM

## 2021-03-02 PROCEDURE — 86580 TB INTRADERMAL TEST: CPT

## 2021-03-02 PROCEDURE — 99207 PR NO CHARGE NURSE ONLY: CPT

## 2021-03-04 ENCOUNTER — ALLIED HEALTH/NURSE VISIT (OUTPATIENT)
Dept: FAMILY MEDICINE | Facility: CLINIC | Age: 41
End: 2021-03-04
Payer: COMMERCIAL

## 2021-03-04 DIAGNOSIS — Z11.1 SCREENING EXAMINATION FOR PULMONARY TUBERCULOSIS: Primary | ICD-10-CM

## 2021-03-04 LAB
PPDINDURATION: 0 MM (ref 0–5)
PPDREDNESS: 0 MM

## 2021-03-04 PROCEDURE — 99207 PR NO CHARGE NURSE ONLY: CPT

## 2021-03-04 RX ORDER — ACYCLOVIR 200 MG/1
CAPSULE ORAL
Qty: 180 CAPSULE | Refills: 0 | Status: SHIPPED | OUTPATIENT
Start: 2021-03-04 | End: 2021-05-06

## 2021-03-04 NOTE — NURSING NOTE
Mantoux result:  Lab Results   Component Value Date    PPDREDNESS 0 03/04/2021    PPDINDURATIO 0 03/04/2021     Is induration greater than 5mm?  No    Antonietta FITZPATRICK, MSN, RN

## 2021-03-04 NOTE — LETTER
March 4, 2021      Germania Hayden  [id number]  [place] [id number]        To Whom It May Concern,     Mantoux results: Negative, 0  No induration.  No swelling.  No redness.        Sincerely,        TRISTAN AKINS/ROBYN RN

## 2021-04-17 ENCOUNTER — HEALTH MAINTENANCE LETTER (OUTPATIENT)
Age: 41
End: 2021-04-17

## 2021-04-22 ENCOUNTER — MYC MEDICAL ADVICE (OUTPATIENT)
Dept: FAMILY MEDICINE | Facility: CLINIC | Age: 41
End: 2021-04-22

## 2021-04-22 DIAGNOSIS — F33.1 MODERATE RECURRENT MAJOR DEPRESSION (H): Primary | ICD-10-CM

## 2021-04-22 RX ORDER — BUPROPION HYDROCHLORIDE 150 MG/1
TABLET ORAL
Qty: 90 TABLET | Refills: 1 | Status: SHIPPED | OUTPATIENT
Start: 2021-04-22 | End: 2021-10-29

## 2021-05-05 DIAGNOSIS — Z86.19 HISTORY OF COLD SORES: ICD-10-CM

## 2021-05-06 RX ORDER — ACYCLOVIR 200 MG/1
CAPSULE ORAL
Qty: 180 CAPSULE | Refills: 0 | Status: SHIPPED | OUTPATIENT
Start: 2021-05-06 | End: 2021-09-10

## 2021-05-06 NOTE — TELEPHONE ENCOUNTER
"Requested Prescriptions   Pending Prescriptions Disp Refills     acyclovir (ZOVIRAX) 200 MG capsule [Pharmacy Med Name: Acyclovir 200 MG Oral Capsule] 180 capsule 0     Sig: TAKE 1 CAPSULE BY MOUTH TWICE DAILY . APPOINTMENT REQUIRED FOR FUTURE REFILLS       Antivirals for Herpes Protocol Failed - 5/5/2021 10:10 AM        Failed - Normal serum creatinine on file in past 12 months     Recent Labs   Lab Test 08/19/19  1605   CR 0.60       Ok to refill medication if creatinine is low          Passed - Patient is age 12 or older        Passed - Recent (12 mo) or future (30 days) visit within the authorizing provider's specialty     Patient has had an office visit with the authorizing provider or a provider within the authorizing providers department within the previous 12 mos or has a future within next 30 days. See \"Patient Info\" tab in inbasket, or \"Choose Columns\" in Meds & Orders section of the refill encounter.              Passed - Medication is active on med list             "

## 2021-09-07 DIAGNOSIS — Z86.19 HISTORY OF COLD SORES: ICD-10-CM

## 2021-09-10 RX ORDER — ACYCLOVIR 200 MG/1
CAPSULE ORAL
Qty: 180 CAPSULE | Refills: 0 | Status: SHIPPED | OUTPATIENT
Start: 2021-09-10 | End: 2021-10-29

## 2021-10-02 ENCOUNTER — HEALTH MAINTENANCE LETTER (OUTPATIENT)
Age: 41
End: 2021-10-02

## 2021-10-07 ENCOUNTER — IMMUNIZATION (OUTPATIENT)
Dept: FAMILY MEDICINE | Facility: CLINIC | Age: 41
End: 2021-10-07
Payer: COMMERCIAL

## 2021-10-07 PROCEDURE — 90471 IMMUNIZATION ADMIN: CPT

## 2021-10-07 PROCEDURE — 90686 IIV4 VACC NO PRSV 0.5 ML IM: CPT

## 2021-10-18 ENCOUNTER — MYC MEDICAL ADVICE (OUTPATIENT)
Dept: FAMILY MEDICINE | Facility: CLINIC | Age: 41
End: 2021-10-18

## 2021-10-29 ENCOUNTER — OFFICE VISIT (OUTPATIENT)
Dept: FAMILY MEDICINE | Facility: CLINIC | Age: 41
End: 2021-10-29
Payer: COMMERCIAL

## 2021-10-29 VITALS
OXYGEN SATURATION: 98 % | TEMPERATURE: 98.5 F | HEART RATE: 91 BPM | BODY MASS INDEX: 20.29 KG/M2 | HEIGHT: 65 IN | DIASTOLIC BLOOD PRESSURE: 68 MMHG | WEIGHT: 121.8 LBS | SYSTOLIC BLOOD PRESSURE: 112 MMHG | RESPIRATION RATE: 16 BRPM

## 2021-10-29 DIAGNOSIS — Z12.31 ENCOUNTER FOR SCREENING MAMMOGRAM FOR BREAST CANCER: ICD-10-CM

## 2021-10-29 DIAGNOSIS — N81.4 UTEROVAGINAL PROLAPSE: ICD-10-CM

## 2021-10-29 DIAGNOSIS — Z86.19 H/O COLD SORES: ICD-10-CM

## 2021-10-29 DIAGNOSIS — F33.1 MODERATE RECURRENT MAJOR DEPRESSION (H): Primary | ICD-10-CM

## 2021-10-29 DIAGNOSIS — N63.0 FIBROUS BREAST LUMPS: ICD-10-CM

## 2021-10-29 PROCEDURE — 99214 OFFICE O/P EST MOD 30 MIN: CPT | Performed by: NURSE PRACTITIONER

## 2021-10-29 RX ORDER — BUPROPION HYDROCHLORIDE 150 MG/1
TABLET ORAL
Qty: 90 TABLET | Refills: 3 | Status: SHIPPED | OUTPATIENT
Start: 2021-10-29 | End: 2022-01-18

## 2021-10-29 RX ORDER — ACYCLOVIR 200 MG/1
CAPSULE ORAL
Qty: 180 CAPSULE | Refills: 3 | Status: SHIPPED | OUTPATIENT
Start: 2021-10-29 | End: 2023-01-26

## 2021-10-29 ASSESSMENT — ANXIETY QUESTIONNAIRES
6. BECOMING EASILY ANNOYED OR IRRITABLE: NOT AT ALL
GAD7 TOTAL SCORE: 3
5. BEING SO RESTLESS THAT IT IS HARD TO SIT STILL: NOT AT ALL
8. IF YOU CHECKED OFF ANY PROBLEMS, HOW DIFFICULT HAVE THESE MADE IT FOR YOU TO DO YOUR WORK, TAKE CARE OF THINGS AT HOME, OR GET ALONG WITH OTHER PEOPLE?: NOT DIFFICULT AT ALL
2. NOT BEING ABLE TO STOP OR CONTROL WORRYING: NOT AT ALL
3. WORRYING TOO MUCH ABOUT DIFFERENT THINGS: SEVERAL DAYS
7. FEELING AFRAID AS IF SOMETHING AWFUL MIGHT HAPPEN: NOT AT ALL
1. FEELING NERVOUS, ANXIOUS, OR ON EDGE: SEVERAL DAYS
GAD7 TOTAL SCORE: 3
7. FEELING AFRAID AS IF SOMETHING AWFUL MIGHT HAPPEN: NOT AT ALL
GAD7 TOTAL SCORE: 3
4. TROUBLE RELAXING: SEVERAL DAYS

## 2021-10-29 ASSESSMENT — PATIENT HEALTH QUESTIONNAIRE - PHQ9
SUM OF ALL RESPONSES TO PHQ QUESTIONS 1-9: 0
10. IF YOU CHECKED OFF ANY PROBLEMS, HOW DIFFICULT HAVE THESE PROBLEMS MADE IT FOR YOU TO DO YOUR WORK, TAKE CARE OF THINGS AT HOME, OR GET ALONG WITH OTHER PEOPLE: NOT DIFFICULT AT ALL
SUM OF ALL RESPONSES TO PHQ QUESTIONS 1-9: 0

## 2021-10-29 ASSESSMENT — PAIN SCALES - GENERAL: PAINLEVEL: NO PAIN (0)

## 2021-10-29 ASSESSMENT — MIFFLIN-ST. JEOR: SCORE: 1224.39

## 2021-10-29 NOTE — PATIENT INSTRUCTIONS
Patient Education     Prevention Guidelines, Women Ages 18 to 39  Screening tests and vaccines are an important part of managing your health. A screening test is done to find possible disorders or diseases in people who don't have any symptoms. The goal is to find a disease early so lifestyle changes can be made and you can be watched more closely to reduce the risk of disease, or to detect it early enough to treat it most effectively. Screening tests are not considered diagnostic, but are used to determine if more testing is needed. Health counseling is essential, too. Below are guidelines for these, for women ages 18 to 39. Talk with your healthcare provider to make sure you re up-to-date on what you need.   Screening Who needs it How often   Alcohol misuse All women in this age group  At routine exams   Blood pressure All women in this age group  Yearly checkup if your blood pressure is normal   Normal blood pressure is less than 120/80 mm Hg   If your blood pressure reading is higher than normal, follow the advice of your healthcare provider    Breast cancer All women in this age group should talk with their healthcare providers about the need for clinical breast exams (CBE)1  Clinical breast exam every 3 years1    Cervical cancer Women ages 21 and older  Women between ages 21 and 29 should have a Pap test every 3 years; women between ages 30 and 65 are advised to have a Pap test plus an HPV test every 5 years    Chlamydia Sexually active women ages 24 and younger, and women at increased risk for infection  Every 3 years if you're at risk or have symptoms    Depression All women in this age group  At routine exams   Diabetes mellitus, type 2  Adults with no symptoms who are overweight or obese and have 1 or more other risk factors for diabetes  At least every 3 years. Also, testing for diabetes during pregnancy after the 24th week.     Gonorrhea Sexually active women at increased risk for infection  At routine  exams   Hepatitis C Anyone at increased risk  At routine exams   HIV All women At routine exams3     Obesity All women in this age group  At routine exams   Syphilis Women at increased risk for infection should talk with their healthcare provider  At routine exams   Tuberculosis Women at increased risk for infection should talk with their healthcare provider  Ask your healthcare provider    Vision All women in this age group  At least 1 complete exam in your 20s, and 2 in your 30s    Vaccine Who needs it How often   Chickenpox (varicella)  All women in this age group who have no record of this infection or vaccine  2 doses; the second dose should be given 4 to 8 weeks after the first dose    Hepatitis A Women at increased risk for infection should talk with their healthcare provider  2 doses given at least 6 months apart    Hepatitis B Women at increased risk for infection should talk with their healthcare provider  3 doses over 6 months; second dose should be given 1 month after the first dose; the third dose should be given at least 2 months after the second dose and at least 4 months after the first dose    Haemophilus influenzae  Type B (HIB)  Women at increased risk for infection should talk with their healthcare provider  1 to 3 doses   Human papillomavirus (HPV)  All women in this age group up to age 26  3 doses; the second dose should be given 1 to 2 months after the first dose and the third dose given 6 months after the first dose    Influenza (flu) All women in this age group  Once a year   Measles, mumps, rubella (MMR)  All women in this age group who have no record of these infections or vaccines  1 or 2 doses   Meningococcal Women at increased risk for infection should talk with their healthcare provider  1 or more doses   Pneumococcal conjugate vaccine (PCV13) and pneumococcal polysaccharide vaccine (PPSV23)  Women at increased risk for infection should talk with their healthcare provider  PCV13: 1  dose ages 19 to 65 (protects against 13 types of pneumococcal bacteria)   PPSV23: 1 to 2 doses through age 64, or 1 dose at 65 or older (protects against 23 types of pneumococcal bacteria)    Tetanus/diphtheria/pertussis (Td/Tdap) booster All women in this age group  Td every 10 years, or a one-time dose of Tdap instead of a Td booster after age 18, then Td every 10 years    Counseling Who needs it How often   BRCA gene mutation testing for breast and ovarian cancer susceptibility  Women with increased risk for having gene mutation  When your risk is known    Breast cancer and chemoprevention  Women at high risk for breast cancer  When your risk is known    Diet and exercise Women who are overweight or obese  When diagnosed, and then at routine exams    Domestic violence Women at the age in which they are able to have children  At routine exams   Sexually transmitted infection prevention  Women who are sexually active  At routine exams   Skin cancer Prevention of skin cancer in fair-skinned adults  At routine exams   Use of tobacco and the health effects it can cause  All women in this age group  Every visit   1 According to the ACS, women ages 20 to 39 years should have a clinical breast exam (CBE) as part of their routine health exam every 3 years. Breast self-exams are an option for women starting in their 20s. But the U.S. Preventive Services Task Force (USPSTF) does not recommend CBE.   2 Those who are 18 years old and not up-to-date on their childhood vaccines should get all appropriate catch-up vaccines recommended by the CDC.   3 The USPSTF recommends that all people ages 15 to 65 years be screened for HIV and those younger or older people at increased risk. The CDC recommends that everyone between the ages of 13 and 64 get tested for HIV at least once as part of routine health care.   [employer] last reviewed this educational content on 10/1/2017    8862-3204 [employer]. All rights reserved.  This information is not intended as a substitute for professional medical care. Always follow your healthcare professional's instructions.

## 2021-10-29 NOTE — PROGRESS NOTES
Assessment & Plan     Moderate recurrent major depression (H)  Stable.    - buPROPion (WELLBUTRIN XL) 150 MG 24 hr tablet  Dispense: 90 tablet; Refill: 3    Uterovaginal prolapse  Discussed options, prevention and referral.    - Ob/Gyn Referral       History of cold sores     - acyclovir (ZOVIRAX) 200 MG capsule  Dispense: 180 capsule; Refill: 3    Fibrous breast lumps  Discussed yearly mammogram - no new symptoms or findings.    - MA Screen Bilateral w/Caesar    Encounter for screening mammogram for breast cancer     - MA Screen Bilateral w/Caesar            See Patient Instructions    No follow-ups on file.    Alexandra Hart NP  [facility]    Total times spent with patient 30 minutes of which > 50% of the time was spent counseling and coordination of care      Davi Solo is a 41 year old who presents for the following health issues     HPI     Depression and Anxiety Follow-Up    How are you doing with your depression since your last visit? Improved     How are you doing with your anxiety since your last visit?  Improved     Are you having other symptoms that might be associated with depression or anxiety? No    Have you had a significant life event? No     Do you have any concerns with your use of alcohol or other drugs? No    Social History     Tobacco Use     Smoking status: Never Smoker     Smokeless tobacco: Never Used   Vaping Use     Vaping Use: Never used   Substance Use Topics     Alcohol use: Yes     Alcohol/week: 0.0 standard drinks     Comment: rare     Drug use: No     PHQ 2/18/2020 8/18/2020 10/29/2021   PHQ-9 Total Score 2 2 0   Q9: Thoughts of better off dead/self-harm past 2 weeks Not at all Not at all Not at all     NOAM-7 SCORE 2/18/2020 8/18/2020 10/29/2021   Total Score 3 (minimal anxiety) - 3 (minimal anxiety)   Total Score 3 3 3     Last PHQ-9 10/29/2021   1.  Little interest or pleasure in doing things 0   2.  Feeling down, depressed, or hopeless 0   3.   "Trouble falling or staying asleep, or sleeping too much 0   4.  Feeling tired or having little energy 0   5.  Poor appetite or overeating 0   6.  Feeling bad about yourself 0   7.  Trouble concentrating 0   8.  Moving slowly or restless 0   Q9: Thoughts of better off dead/self-harm past 2 weeks 0   PHQ-9 Total Score 0   Difficulty at work, home, or with people -     NOAM-7  10/29/2021   1. Feeling nervous, anxious, or on edge 1   2. Not being able to stop or control worrying 0   3. Worrying too much about different things 1   4. Trouble relaxing 1   5. Being so restless that it is hard to sit still 0   6. Becoming easily annoyed or irritable 0   7. Feeling afraid, as if something awful might happen 0   NOAM-7 Total Score 3   If you checked any problems, how difficult have they made it for you to do your work, take care of things at home, or get along with other people? -       Suicide Assessment Five-step Evaluation and Treatment (SAFE-T)         How many servings of fruits and vegetables do you eat daily?  4 or more    On average, how many sweetened beverages do you drink each day (Examples: soda, juice, sweet tea, etc.  Do NOT count diet or artificially sweetened beverages)?   0    How many days per week do you exercise enough to make your heart beat faster? 3 or less    How many minutes a day do you exercise enough to make your heart beat faster? 20 - 29    How many days per week do you miss taking your medication? 0    Taking Acyclovir twice daily for history of cold sores.    Review of Systems   Constitutional, HEENT, cardiovascular, pulmonary, GI, , musculoskeletal, neuro, skin, endocrine and psych systems are negative, except as otherwise noted.  POS for uterovaginal prolapse - difficulty with exercise, urinary symptoms.      Objective    /68 (BP Location: Left arm, Patient Position: Sitting, Cuff Size: Adult Regular)   Pulse 91   Temp 98.5  F (36.9  C) (Tympanic)   Resp 16   Ht 1.645 m (5' 4.75\")  "  Wt 55.2 kg (121 lb 12.8 oz)   LMP 10/08/2021 (Within Days)   SpO2 98%   Breastfeeding No   BMI 20.43 kg/m    Body mass index is 20.43 kg/m .  Physical Exam   GENERAL: healthy, alert and no distress  NECK: no adenopathy, no asymmetry, masses, or scars and thyroid normal to palpation  RESP: lungs clear to auscultation - no rales, rhonchi or wheezes  CV: regular rate and rhythm, normal S1 S2, no S3 or S4, no murmur, click or rub, no peripheral edema and peripheral pulses strong  ABDOMEN: soft, nontender, no hepatosplenomegaly, no masses and bowel sounds normal  MS: no gross musculoskeletal defects noted, no edema  PSYCH: mentation appears normal, affect normal/bright, anxious and judgement and insight intact                Answers for HPI/ROS submitted by the patient on 10/29/2021  If you checked off any problems, how difficult have these problems made it for you to do your work, take care of things at home, or get along with other people?: Not difficult at all  PHQ9 TOTAL SCORE: 0  NOAM 7 TOTAL SCORE: 3

## 2021-10-30 ASSESSMENT — ANXIETY QUESTIONNAIRES: GAD7 TOTAL SCORE: 3

## 2021-10-30 ASSESSMENT — PATIENT HEALTH QUESTIONNAIRE - PHQ9: SUM OF ALL RESPONSES TO PHQ QUESTIONS 1-9: 0

## 2021-11-22 ENCOUNTER — OFFICE VISIT (OUTPATIENT)
Dept: UROLOGY | Facility: CLINIC | Age: 41
End: 2021-11-22
Attending: NURSE PRACTITIONER
Payer: COMMERCIAL

## 2021-11-22 VITALS
SYSTOLIC BLOOD PRESSURE: 108 MMHG | BODY MASS INDEX: 20.46 KG/M2 | HEART RATE: 111 BPM | DIASTOLIC BLOOD PRESSURE: 72 MMHG | WEIGHT: 122 LBS

## 2021-11-22 DIAGNOSIS — N81.11 CYSTOCELE, MIDLINE: ICD-10-CM

## 2021-11-22 DIAGNOSIS — N81.6 RECTOCELE: Primary | ICD-10-CM

## 2021-11-22 DIAGNOSIS — N81.4 UTEROVAGINAL PROLAPSE: ICD-10-CM

## 2021-11-22 PROCEDURE — 99205 OFFICE O/P NEW HI 60 MIN: CPT | Performed by: OBSTETRICS & GYNECOLOGY

## 2021-11-22 PROCEDURE — G0463 HOSPITAL OUTPT CLINIC VISIT: HCPCS

## 2021-11-22 ASSESSMENT — PATIENT HEALTH QUESTIONNAIRE - PHQ9: 5. POOR APPETITE OR OVEREATING: SEVERAL DAYS

## 2021-11-22 ASSESSMENT — ANXIETY QUESTIONNAIRES
1. FEELING NERVOUS, ANXIOUS, OR ON EDGE: SEVERAL DAYS
5. BEING SO RESTLESS THAT IT IS HARD TO SIT STILL: NOT AT ALL
7. FEELING AFRAID AS IF SOMETHING AWFUL MIGHT HAPPEN: NOT AT ALL
IF YOU CHECKED OFF ANY PROBLEMS ON THIS QUESTIONNAIRE, HOW DIFFICULT HAVE THESE PROBLEMS MADE IT FOR YOU TO DO YOUR WORK, TAKE CARE OF THINGS AT HOME, OR GET ALONG WITH OTHER PEOPLE: NOT DIFFICULT AT ALL
GAD7 TOTAL SCORE: 3
3. WORRYING TOO MUCH ABOUT DIFFERENT THINGS: SEVERAL DAYS
2. NOT BEING ABLE TO STOP OR CONTROL WORRYING: NOT AT ALL
6. BECOMING EASILY ANNOYED OR IRRITABLE: NOT AT ALL

## 2021-11-22 ASSESSMENT — PAIN SCALES - GENERAL: PAINLEVEL: NO PAIN (0)

## 2021-11-22 NOTE — PROGRESS NOTES
2021    Referring Provider: Alexandra Hart, NP  [address] [place] [address]    Primary Care Provider: Alexandra Hart    CC: vaginal bulge    HPI:  Germania Hayden is a 41 year old  female who presents for evaluation of pelvic organ prolapse.   She has a known uterovaginal prolapse which was evaluated by her OBGYN at [facility] a couple of years ago and at that time, she was not too symptomatic and had resorted to expectant management. Today, she says she has been having more pelvic pressure and bulge symptoms which is bothersome to her. No active protrusion or need to reduce prolapse. She is physically very active and feels that this is curtailing her ability to be active.      Urinary Symptoms/Voiding function  Says she is voiding a bit more frequency than usual and also has slower urine stream. She voids 8 times during the day and none at night. Does not splint to void. She denies any recurrent UTIs or gross hematuria. No bothersome stress or urgency incontinence.     Gastrointestinal Symptoms:  Denies chronic diarrhea, constipation. Denies bothersome fecal or flatal incontinence. Denies defecatory difficulties although has to sometimes lean forward to evacuate fully      Sexual function/Pelvic floor pain/GYN:   Sexually active, no pain with intercourse. Regular monthly cycles. Neg pap and HPV in 3/29/19      Relevant Medical History:    Diabetes? no  High Blood pressure? no     Recurrent UTIs? no  Sleep Apnea? no  Obesity? no  History of Blood clots? no  Other : See med hx    Surgical History:      Past Surgical History:   Procedure Laterality Date     ARTHROSCOPIC RECONSTRUCTION ANTERIOR CRUCIATE LIGAMENT  x3    twice on L knee and once on Rt knee     C BREAST AUGMENTATION       ESOPHAGOSCOPY, GASTROSCOPY, DUODENOSCOPY (EGD), COMBINED N/A 10/10/2019    Procedure: ESOPHAGOGASTRODUODENOSCOPY, WITH BIOPSY;  Surgeon: Shirlene Wilson MD;  Location:  OR      MAMMOPLASTY AUGMENTATION Bilateral 2015     wisdom teeth         OB/Gyn History:  OB History    Para Term  AB Living   3 3 3 0 0 3   SAB IAB Ectopic Multiple Live Births   0 0 0 0 3      # Outcome Date GA Lbr Ryan/2nd Weight Sex Delivery Anes PTL Lv   3 Term 11/10/16 40w0d 01:31 / 00:08 3.204 kg (7 lb 1 oz) M Vag-Spont INT N LORNE      Name: CRUZITO VILLELA      Apgar1: 7  Apgar5: 9   2 Term 14 39w3d  3.175 kg (7 lb) F Vag-Spont None  LORNE      Name: Star      Apgar1: 9  Apgar5: 10   1 Term 12 40w1d 15:18 / 00:12 3.263 kg (7 lb 3.1 oz) F Vag-Vacuum EPI N LORNE      Name: Maximino Lozano      Apgar1: 8  Apgar5: 9       Medications/Vitamins/Supplements:   Current Outpatient Medications   Medication     acyclovir (ZOVIRAX) 200 MG capsule     buPROPion (WELLBUTRIN XL) 150 MG 24 hr tablet     Multiple Vitamins-Minerals (MULTIVITAMIN PO)     No current facility-administered medications for this visit.         Medical History:      Past Medical History:   Diagnosis Date     ASCUS favor benign 2011    neg HPV, repeat Pap + HPV cotesting in 3 years     Chickenpox      Depression      Eating disorder     bulemia- age 18 to 32     Uterovaginal prolapse 10/27/2017     ROS  Social History    Social History     Socioeconomic History     Marital status:      Spouse name: Eris     Number of children: 2     Years of education: Not on file     Highest education level: Not on file   Occupational History     Employer: [employer]   Tobacco Use     Smoking status: Never Smoker     Smokeless tobacco: Never Used   Vaping Use     Vaping Use: Never used   Substance and Sexual Activity     Alcohol use: Yes     Alcohol/week: 0.0 standard drinks     Comment: rare     Drug use: No     Sexual activity: Yes     Partners: Male   Other Topics Concern     Parent/sibling w/ CABG, MI or angioplasty before 65F 55M? No   Social History Narrative     Not on file     Social Determinants of  Health     Financial Resource Strain: Not on file   Food Insecurity: Not on file   Transportation Needs: Not on file   Physical Activity: Not on file   Stress: Not on file   Social Connections: Not on file   Intimate Partner Violence: Not on file   Housing Stability: Not on file       Family History  Family History   Problem Relation Age of Onset     Cancer Paternal Grandmother 60        ovarian     Breast Cancer Paternal Grandmother 85.00     Cancer Maternal Grandmother         lymphoma     Cancer Maternal Grandfather         lung ,bone     Cardiovascular Paternal Grandfather         MI     Mental Illness Brother         schizoaffective disorder     Depression Mother      Colon Polyps Mother      Gastrointestinal Disease Brother         ulcerative colitis     Melanoma No family hx of        Allergy    No Known Allergies    Current Outpatient Medications   Medication     acyclovir (ZOVIRAX) 200 MG capsule     buPROPion (WELLBUTRIN XL) 150 MG 24 hr tablet     Multiple Vitamins-Minerals (MULTIVITAMIN PO)     No current facility-administered medications for this visit.       Physical Exam: /72   Pulse 111   Wt 55.3 kg (122 lb)   LMP 11/01/2021   BMI 20.46 kg/m      There were no vitals taken for this visit. No LMP recorded. There is no height or weight on file to calculate BMI.    Gen:  is alert, comfortable in no acute distress,   Abdomen: Abdomen is soft, non-tender, non-distended,   Lungs: non-labored breathing.       Pelvic Exam:   Normal external female genitalia. The urethra was normal.    Vagina: Stage 2 primarily posterior defect ( rectocele) with some apical and anterior components. No abnormal discharge  Uterus: Normal size, non tender. Normal appearing cervix  Ovaries: not palpable, no masses  Vulva: no lesion , some discoloration/irritation around anal opening. Pruritic.   Rectal: rectocele as above. No visible or palpable hemorrhoids    Pelvic floor strength: 2/5 kegels.    Pelvic floor muscles:  No levator myalgia. Normal tone    POPQ EXAM FOR PROLAPSE SEVERITY  (Aa):   -1 (Ba):   -1 (standing) (C ):    -6   (GH):   4 (PB):  2 (TVL):  10   (Ap):   0 (Bp):  0 (D):   -8     ICS Stage (1-4):  2    Voiding trial:    VOID 600 ml  PVR 49 mL by Bladder ultrasound    Labs:   Color Urine (no units)   Date Value   08/19/2019 Straw     Appearance Urine (no units)   Date Value   08/19/2019 Clear     Glucose Urine (mg/dL)   Date Value   08/19/2019 Negative     Bilirubin Urine (no units)   Date Value   08/19/2019 Negative     Ketones Urine (mg/dL)   Date Value   08/19/2019 5 (A)     Specific Gravity Urine (no units)   Date Value   08/19/2019 1.006     pH Urine (pH)   Date Value   08/19/2019 7.0     Protein Albumin Urine (mg/dL)   Date Value   08/19/2019 Negative     Urobilinogen Urine (EU/dL)   Date Value   04/08/2016 0.2     Nitrite Urine (no units)   Date Value   08/19/2019 Negative     Leukocyte Esterase Urine (no units)   Date Value   08/19/2019 Negative     CBC RESULTS:   Recent Labs   Lab Test 08/19/19  1605   WBC 5.5   RBC 4.56   HGB 13.3   HCT 42.5   MCV 93   MCH 29.2   MCHC 31.3*   RDW 12.8            A/P: Germania Haydne is a 41 year old F with   Germania was seen today for consult.    Diagnoses and all orders for this visit:    Rectocele  -     [facility] Referral; Future    Uterovaginal prolapse  -     Ob/Gyn Referral  -     [facility] Referral; Future    Cystocele, midline  -     [facility] Referral; Future      Has mild to mod symptoms of prolpase  Stage 2 primarily rectocele with some apical and anterior components as well  We discussed various options including expectant management, pelvic floor physical therapy, pessary or surgery. At this point, she would try pelvic PT first and if symptoms are not improve in 3-6 months, she will make a virtual appointment with me to discuss other options. She did try pessary in the past and says this did not help ( likely because her symptoms are  mostly from her rectocele which is more distal from where the pessary will be).     All questions answered today. Referral to Pelvic PT placed    Information provided on the following as detailed below    1. Expectant management:  You could do certain things to help stabilize your pelvic floor problems such as doing kegel exercises twice a day ( 20 in the morning, 20 in the evening), avoiding heavy lifting and constipation. Otherwise, if you are not too bothered by this, you do not need to do anything else. I have referred you for pelvic PT to help you learn the proper techniques for pelvic floor muscle strenghening. There is a chance that this problem could progress however and if it does and becomes bothersome ( such as urinary frequency, urgency, difficulty emptying your bowels or bladder, more severe sensation of pressure or bulge, protrusion of vaginal tissue etc), then you can consider the followin.  Pessary: Looks like you have tried this before and it did not work. If you like to try this again, we can make an appointment for fitting.     3. Surgery ( vaginal non mesh procedure vs minimally invasive abdominal option with mesh). Both surgical options may require removing your uterus.   Remember, with surgery, there is risk of bleeding, infection, injury to surrounding organs, vascular, bowel, cardiac or respiratory complications secondary to surgery or anaesthesia, persistent pain that may be chronic as well as unanticipated neurologic complications and even death. With mesh procedure, there is additional risk of mesh erosion. This may necessitate removing mesh either vaginally or abdominally. Even with surgery, there is a risk of recurrence of prolapse (or failure of surgery) that may necessitate repeat surgery. Our abdominal mesh procedure in general may have longer duration of cure which may or may not be attainable with vaginal procedures that depend on your own tissue for repair. However mesh  augmented abdominal repairs may be longer surgeries.      After surgery, there will be restriction to lifting to 10 lbs or less for 2 months and vaginal rest with nothing in the vagina for 2 months.     If you decide to pursue surgery, please call and make either a phone or in person appointment with me so we can discuss further.       I spent a total of 60  minutes with  Germania CLARKE Jackelyn  on the date of the encounter in chart review, face to face patient visit, review of tests, documentation and/or discussion with other providers about the issues documented above.     Amy Eubanks MD, [facility]  , Department of OBGYN  Female Pelvic Medicine and Reconstructive Surgery ( Urogynecology)  CC  Patient Care Team:  Alexandra Hart NP as PCP - General (Nurse Practitioner - Family)  Alexandra Hart NP as Assigned PCP  Amy Eubanks MD as MD (OB/Gyn)  ALEXANDRA HART

## 2021-11-22 NOTE — PATIENT INSTRUCTIONS
Dear Germania,  It was very nice to meet you today.  As we discussed, you have a stage 2 uterovaginal prolapse with a rectocele and mild cystocele     Today we discussed the following options  1. Expectant management:  You could do certain things to help stabilize your pelvic floor problems such as doing kegel exercises twice a day ( 20 in the morning, 20 in the evening), avoiding heavy lifting and constipation. Otherwise, if you are not too bothered by this, you do not need to do anything else. I have referred you for pelvic PT to help you learn the proper techniques for pelvic floor muscle strenghening. There is a chance that this problem could progress however and if it does and becomes bothersome ( such as urinary frequency, urgency, difficulty emptying your bowels or bladder, more severe sensation of pressure or bulge, protrusion of vaginal tissue etc), then you can consider the followin.  Pessary: Looks like you have tried this before and it did not work. If you like to try this again, we can make an appointment for fitting.     3. Surgery ( vaginal non mesh procedure vs minimally invasive abdominal option with mesh). Both surgical options may require removing your uterus.   Remember, with surgery, there is risk of bleeding, infection, injury to surrounding organs, vascular, bowel, cardiac or respiratory complications secondary to surgery or anaesthesia, persistent pain that may be chronic as well as unanticipated neurologic complications and even death. With mesh procedure, there is additional risk of mesh erosion. This may necessitate removing mesh either vaginally or abdominally. Even with surgery, there is a risk of recurrence of prolapse (or failure of surgery) that may necessitate repeat surgery. Our abdominal mesh procedure in general may have longer duration of cure which may or may not be attainable with vaginal procedures that depend on your own tissue for repair. However mesh augmented  abdominal repairs may be longer surgeries.      After surgery, there will be restriction to lifting to 10 lbs or less for 2 months and vaginal rest with nothing in the vagina for 2 months.     If you decide to pursue surgery, please call and make either a phone or in person appointment with me so we can discuss further.    Urogynecology Advice nurses ( contact information):     Provider:   Amy Eubanks MD, MCR     What is Pelvic Organ Prolapse?    Pelvic organ prolapse is a medical condition that occurs when the normal support of the vagina is lost, resulting in  sagging  or dropping of the bladder, urethra, cervix and rectum. As the prolapse of the vagina and uterus progresses, women can feel bulging tissue protruding through the opening of the vagina.     Causes and Risk Factors  By studying large numbers of women with and without prolapse, researchers and urogynecologists have identified certain risk factors that predispose, cause, promote or worsen pelvic organ prolapse. The strength of our bones, muscles and connective tissue are influenced by our genes and our race. Some women are born with weaker tissues and are therefore at risk to develop prolapse.  women are more likely than  women to develop pelvic organ prolapse. Loss of pelvic support can occur when any part of the pelvic floor is injured during vaginal delivery, surgery, pelvic radiation or back and pelvic fractures during falls or motor vehicle accidents. Hysterectomy and other procedures done to treat pelvic organ prolapse also are associated with future development of prolapse. Some other conditions that promote prolapse include: constipation and chronic straining, smoking, chronic coughing and heavy lifting. Obesity, like smoking, is one of the few modifiable risk factors. Women who are obese have a 40 to 75% increased risk of pelvic organ prolapse. Aging, menopause, debilitating nerve and muscle diseases contribute to the  deterioration of pelvic floor strength and the development of prolapse.   Incidence  We do not know exactly how common pelvic organ prolapse is because research is limited to women who seek health care. It is estimated that nearly 50% of all women between the ages of 50 and 79 have some form of prolapse. The lifetime risk that a woman will have surgery for the correction of prolapse or urinary incontinence in the [place] is about 11%. We also know that only one-third of these women will undergo repeat corrective surgery for these conditions. Approximately 300,000 procedures for correction of pelvic organ prolapse are performed each year in the [place]. We believe that is just the tip of the iceberg as many women manage their prolapse without surgery.     Symptoms  Some loss of support is a very common finding upon physical exam in women, many of whom do not have bothersome symptoms. Those women who are uncomfortable often describe the very first signs as subtle--such as an inability to keep a tampon inside the vagina, dampness in underwear or discomfort due to dryness during intercourse.   As the prolapse gets worse, some women complain of:     A bulging, pressure or heavy sensation in the vagina that worsens by the end of the day or during bowel movements     The feeling that they are  sitting on a ball      Needing to push stool out of the rectum by placing their fingers into the vagina during bowel movement     Difficulty starting to urinate, a weak or spraying stream of urine     Urinary frequency or the sensation that they are not emptying their bladder well     The need to lift up the bulging vagina or uterus to start urination     Urine leakage with intercourse     Types of Prolapse   Anterior Vaginal Prolapse (also known as cystocele)  This type of prolapse occurs when the wall between the vagina and the bladder stretches or detaches from its attachment on the pelvic bones. This loss of support  allows the bladder to prolapse or fall down into the vagina.   Most women do not have symptoms when the anterior vaginal prolapse is mild. As it progresses outside the opening of the vagina, the prolapsed bladder may not empty well which can lead to urinary frequency, night time voiding, loss of bladder control and recurrent bladder infections. Strengthening pelvic muscles may improve the support to the bladder and neighboring organs and reduce symptoms. In addition, women can get temporary support by wearing a device called a vaginal pessary. It works much like a knee or ankle brace would support a weak joint. When these efforts are inadequate surgery is available to elevate the bladder and other internal organs to their proper position.     Posterior Vaginal Prolapse (also known as rectocele)  Weakening and stretching of the back wall of the vagina allows the rectum to bulge into and out of the vagina. Most often, the damage to the back wall of the vagina occurs during vaginal childbirth, although not everyone who has delivered a child vaginally will develop a rectocele. Mild rectoceles rarely cause symptoms. However, straining with constipation puts significant pressure on the weak vaginal wall and can further thin it out. Avoiding constipation may prevent progression and also reduce symptoms from the rectocele. Some women may find benefit from pelvic floor muscle strengthening and vaginal pessaries. When these low risk interventions are insufficient to relieve symptoms, surgery is performed to reinforce the posterior vaginal wall. This picture shows what a rectocele looks like from the outside.     Uterine Prolapse  When the supporting ligaments and muscles of the pelvic floor that keep the uterus in the pelvis are damaged, the cervix and uterus descend into and eventually out of the vagina. Often, uterine prolapse is associated with loss of vaginal wall support (cystocele, rectocele). When the cervix protrudes  outside the vagina, it can develop ulcers from rubbing on underwear or protective pads. There is a risk that these ulcers will bleed and become infected. This picture shows what uterine prolapse looks like from the outside. As with other forms of prolapse, it is not until the uterine descent is bothersome that treatment is necessary. Women who have uterine prolapse often report pelvic pressure, the need to sit or lay down to relieve the discomfort, a sensation that their insides are falling out, difficulty emptying their bladder and urine leakage. Strengthening the pelvic muscles with Kegel exercises, avoiding heavy lifting, constipation, and weight gain may reduce the risk of progression of uterine descent. Additional treatment options include pessary devices which provide support when worn or surgery.     Vaginal Prolapse after Hysterectomy  If a woman has already had a hysterectomy, the very top of the vagina (where the uterus used to be) can become detached from its supporting ligaments. This can results in the tube of the vagina turning inside out. This condition is also known as vaginal  vault  prolapse. Depending upon how extensively the top of the vagina is turning inside out, one or several pelvic organs (such as the bladder, small and large bowel) will prolapse into the protruding bulge. Symptoms depend on which organs prolapse. When the bladder is involved, women report difficulty in starting to urinate, and emptying their bladder well. If it is the bowel then many report the need to push up the vaginal bulge and strain to have a bowel movement. Skin sores may develop if the bulge is rubbing on pads or underwear. A pessary may provide support for the bulge otherwise surgery is recommended. This is a picture of a vaginal vault prolapse.     Rectal Prolapse  The rectum is the name given to the last 6 inches of the colon. Like the vagina and uterus, the rectum is normally securely attached to the bony pelvis  by ligaments and muscles. Infrequently, the supporting structures stretch or detach from the rectal wall which results in the rectum relapsing through the anus. This looks like red, often donut shaped soft tissue coming through the anus. Early on, it is most often noticed on the toilet after a bowel movement, and can be confused with a large hemorrhoid. Conditions associated with straining such as chronic constipation or diarrhea, nerve and muscle weakness (paralysis or multiple sclerosis) and advancing age are risk factors for rectal prolapse. Women with rectal prolapse often report the following symptoms: pain during bowel movements, mucus or blood discharge from the protruding tissue, loss of control of bowel movements, and soft, red tissue protruding from the anus. It is very important to be clear in describing where the bulging tissue is coming from (opening of the anus or the vagina) when you seek help as both conditions may be present simultaneously. Treatment for a rectocele and rectal prolapse are different.     Source:  [employer]  Original publication date: May, 2008;  Website:  http://[email or web address]/p/[email or web address]/[email or web address]/[email or web address]

## 2021-11-22 NOTE — LETTER
2021       RE: Germania Hayden  [address]  [place] [address]     Dear Colleague,    Thank you for referring your patient, Germania Hayden, to the [facility] [place] at [facility]. Please see a copy of my visit note below.    2021    Referring Provider: Alexandra Hart NP  [address] [place] [address]    Primary Care Provider: Alexandra Hart    CC: vaginal bulge    HPI:  Germania Hayden is a 41 year old  female who presents for evaluation of pelvic organ prolapse.   She has a known uterovaginal prolapse which was evaluated by her OBGYN at [facility] a couple of years ago and at that time, she was not too symptomatic and had resorted to expectant management. Today, she says she has been having more pelvic pressure and bulge symptoms which is bothersome to her. No active protrusion or need to reduce prolapse. She is physically very active and feels that this is curtailing her ability to be active.      Urinary Symptoms/Voiding function  Says she is voiding a bit more frequency than usual and also has slower urine stream. She voids 8 times during the day and none at night. Does not splint to void. She denies any recurrent UTIs or gross hematuria. No bothersome stress or urgency incontinence.     Gastrointestinal Symptoms:  Denies chronic diarrhea, constipation. Denies bothersome fecal or flatal incontinence. Denies defecatory difficulties although has to sometimes lean forward to evacuate fully      Sexual function/Pelvic floor pain/GYN:   Sexually active, no pain with intercourse. Regular monthly cycles. Neg pap and HPV in 3/29/19      Relevant Medical History:    Diabetes? no  High Blood pressure? no     Recurrent UTIs? no  Sleep Apnea? no  Obesity? no  History of Blood clots? no  Other : See med hx    Surgical History:      Past Surgical History:   Procedure Laterality Date      ARTHROSCOPIC RECONSTRUCTION ANTERIOR CRUCIATE LIGAMENT  x3    twice on L knee and once on Rt knee     C BREAST AUGMENTATION       ESOPHAGOSCOPY, GASTROSCOPY, DUODENOSCOPY (EGD), COMBINED N/A 10/10/2019    Procedure: ESOPHAGOGASTRODUODENOSCOPY, WITH BIOPSY;  Surgeon: Shirlene Wilson MD;  Location: [facility] OR     MAMMOPLASTY AUGMENTATION Bilateral 2015     wisdom teeth         OB/Gyn History:  OB History    Para Term  AB Living   3 3 3 0 0 3   SAB IAB Ectopic Multiple Live Births   0 0 0 0 3      # Outcome Date GA Lbr Ryan/2nd Weight Sex Delivery Anes PTL Lv   3 Term 11/10/16 40w0d 01:31 / 00:08 3.204 kg (7 lb 1 oz) M Vag-Spont INT N LORNE      Name: CRUZITO VILLELA      Apgar1: 7  Apgar5: 9   2 Term 14 39w3d  3.175 kg (7 lb) F Vag-Spont None  LORNE      Name: Star      Apgar1: 9  Apgar5: 10   1 Term 12 40w1d 15:18 / 00:12 3.263 kg (7 lb 3.1 oz) F Vag-Vacuum EPI N LORNE      Name: Maximino Marta      Apgar1: 8  Apgar5: 9       Medications/Vitamins/Supplements:   Current Outpatient Medications   Medication     acyclovir (ZOVIRAX) 200 MG capsule     buPROPion (WELLBUTRIN XL) 150 MG 24 hr tablet     Multiple Vitamins-Minerals (MULTIVITAMIN PO)     No current facility-administered medications for this visit.         Medical History:      Past Medical History:   Diagnosis Date     ASCUS favor benign 2011    neg HPV, repeat Pap + HPV cotesting in 3 years     Chickenpox      Depression      Eating disorder     bulemia- age 18 to 32     Uterovaginal prolapse 10/27/2017     ROS  Social History    Social History     Socioeconomic History     Marital status:      Spouse name: Eris     Number of children: 2     Years of education: Not on file     Highest education level: Not on file   Occupational History     Employer: [employer]   Tobacco Use     Smoking status: Never Smoker     Smokeless tobacco: Never Used   Vaping Use     Vaping Use: Never used   Substance  and Sexual Activity     Alcohol use: Yes     Alcohol/week: 0.0 standard drinks     Comment: rare     Drug use: No     Sexual activity: Yes     Partners: Male   Other Topics Concern     Parent/sibling w/ CABG, MI or angioplasty before 65F 55M? No   Social History Narrative     Not on file     Social Determinants of Health     Financial Resource Strain: Not on file   Food Insecurity: Not on file   Transportation Needs: Not on file   Physical Activity: Not on file   Stress: Not on file   Social Connections: Not on file   Intimate Partner Violence: Not on file   Housing Stability: Not on file       Family History  Family History   Problem Relation Age of Onset     Cancer Paternal Grandmother 60        ovarian     Breast Cancer Paternal Grandmother 85.00     Cancer Maternal Grandmother         lymphoma     Cancer Maternal Grandfather         lung ,bone     Cardiovascular Paternal Grandfather         MI     Mental Illness Brother         schizoaffective disorder     Depression Mother      Colon Polyps Mother      Gastrointestinal Disease Brother         ulcerative colitis     Melanoma No family hx of        Allergy    No Known Allergies    Current Outpatient Medications   Medication     acyclovir (ZOVIRAX) 200 MG capsule     buPROPion (WELLBUTRIN XL) 150 MG 24 hr tablet     Multiple Vitamins-Minerals (MULTIVITAMIN PO)     No current facility-administered medications for this visit.       Physical Exam: /72   Pulse 111   Wt 55.3 kg (122 lb)   LMP 11/01/2021   BMI 20.46 kg/m      There were no vitals taken for this visit. No LMP recorded. There is no height or weight on file to calculate BMI.    Gen:  is alert, comfortable in no acute distress,   Abdomen: Abdomen is soft, non-tender, non-distended,   Lungs: non-labored breathing.       Pelvic Exam:   Normal external female genitalia. The urethra was normal.    Vagina: Stage 2 primarily posterior defect ( rectocele) with some apical and anterior components. No  abnormal discharge  Uterus: Normal size, non tender. Normal appearing cervix  Ovaries: not palpable, no masses  Vulva: no lesion , some discoloration/irritation around anal opening. Pruritic.   Rectal: rectocele as above. No visible or palpable hemorrhoids    Pelvic floor strength: 2/5 kegels.    Pelvic floor muscles: No levator myalgia. Normal tone    POPQ EXAM FOR PROLAPSE SEVERITY  (Aa):   -1 (Ba):   -1 (standing) (C ):    -6   (GH):   4 (PB):  2 (TVL):  10   (Ap):   0 (Bp):  0 (D):   -8     ICS Stage (1-4):  2    Voiding trial:    VOID 600 ml  PVR 49 mL by Bladder ultrasound    Labs:   Color Urine (no units)   Date Value   08/19/2019 Straw     Appearance Urine (no units)   Date Value   08/19/2019 Clear     Glucose Urine (mg/dL)   Date Value   08/19/2019 Negative     Bilirubin Urine (no units)   Date Value   08/19/2019 Negative     Ketones Urine (mg/dL)   Date Value   08/19/2019 5 (A)     Specific Gravity Urine (no units)   Date Value   08/19/2019 1.006     pH Urine (pH)   Date Value   08/19/2019 7.0     Protein Albumin Urine (mg/dL)   Date Value   08/19/2019 Negative     Urobilinogen Urine (EU/dL)   Date Value   04/08/2016 0.2     Nitrite Urine (no units)   Date Value   08/19/2019 Negative     Leukocyte Esterase Urine (no units)   Date Value   08/19/2019 Negative     CBC RESULTS:   Recent Labs   Lab Test 08/19/19  1605   WBC 5.5   RBC 4.56   HGB 13.3   HCT 42.5   MCV 93   MCH 29.2   MCHC 31.3*   RDW 12.8            A/P: Germania Hayden is a 41 year old F with   Germania was seen today for consult.    Diagnoses and all orders for this visit:    Rectocele  -     [facility] Referral; Future    Uterovaginal prolapse  -     Ob/Gyn Referral  -     [facility] Referral; Future    Cystocele, midline  -     [facility] Referral; Future      Has mild to mod symptoms of prolpase  Stage 2 primarily rectocele with some apical and anterior components as well  We discussed various options including  expectant management, pelvic floor physical therapy, pessary or surgery. At this point, she would try pelvic PT first and if symptoms are not improve in 3-6 months, she will make a virtual appointment with me to discuss other options. She did try pessary in the past and says this did not help ( likely because her symptoms are mostly from her rectocele which is more distal from where the pessary will be).     All questions answered today. Referral to Pelvic PT placed    Information provided on the following as detailed below    1. Expectant management:  You could do certain things to help stabilize your pelvic floor problems such as doing kegel exercises twice a day ( 20 in the morning, 20 in the evening), avoiding heavy lifting and constipation. Otherwise, if you are not too bothered by this, you do not need to do anything else. I have referred you for pelvic PT to help you learn the proper techniques for pelvic floor muscle strenghening. There is a chance that this problem could progress however and if it does and becomes bothersome ( such as urinary frequency, urgency, difficulty emptying your bowels or bladder, more severe sensation of pressure or bulge, protrusion of vaginal tissue etc), then you can consider the followin.  Pessary: Looks like you have tried this before and it did not work. If you like to try this again, we can make an appointment for fitting.     3. Surgery ( vaginal non mesh procedure vs minimally invasive abdominal option with mesh). Both surgical options may require removing your uterus.   Remember, with surgery, there is risk of bleeding, infection, injury to surrounding organs, vascular, bowel, cardiac or respiratory complications secondary to surgery or anaesthesia, persistent pain that may be chronic as well as unanticipated neurologic complications and even death. With mesh procedure, there is additional risk of mesh erosion. This may necessitate removing mesh either vaginally or  abdominally. Even with surgery, there is a risk of recurrence of prolapse (or failure of surgery) that may necessitate repeat surgery. Our abdominal mesh procedure in general may have longer duration of cure which may or may not be attainable with vaginal procedures that depend on your own tissue for repair. However mesh augmented abdominal repairs may be longer surgeries.      After surgery, there will be restriction to lifting to 10 lbs or less for 2 months and vaginal rest with nothing in the vagina for 2 months.     If you decide to pursue surgery, please call and make either a phone or in person appointment with me so we can discuss further.       I spent a total of 60  minutes with  Germania Hayden  on the date of the encounter in chart review, face to face patient visit, review of tests, documentation and/or discussion with other providers about the issues documented above.     Amy Eubansk MD, [facility]  , Department of OBGYN  Female Pelvic Medicine and Reconstructive Surgery ( Urogynecology)  CC  Patient Care Team:  Alyssa Hart NP as PCP - General (Nurse Practitioner - Family)  Alyssa aHrt NP as Assigned PCP  Amy Eubanks MD as MD (OB/Gyn)  ALYSSA HART

## 2021-11-23 ASSESSMENT — PATIENT HEALTH QUESTIONNAIRE - PHQ9: SUM OF ALL RESPONSES TO PHQ QUESTIONS 1-9: 3

## 2021-11-23 ASSESSMENT — ANXIETY QUESTIONNAIRES: GAD7 TOTAL SCORE: 3

## 2021-11-29 ENCOUNTER — MYC MEDICAL ADVICE (OUTPATIENT)
Dept: FAMILY MEDICINE | Facility: CLINIC | Age: 41
End: 2021-11-29
Payer: COMMERCIAL

## 2021-11-29 DIAGNOSIS — N81.4 UTEROVAGINAL PROLAPSE: ICD-10-CM

## 2021-11-29 DIAGNOSIS — L68.0 HIRSUTISM: Primary | ICD-10-CM

## 2021-11-29 RX ORDER — SPIRONOLACTONE 50 MG/1
50 TABLET, FILM COATED ORAL 2 TIMES DAILY
Qty: 180 TABLET | Refills: 1 | Status: SHIPPED | OUTPATIENT
Start: 2021-11-29 | End: 2022-02-01

## 2021-11-29 NOTE — TELEPHONE ENCOUNTER
Rob Morris,    Please see medical message.     Update on prolapse and patient is requesting new RX for Spironolactone for hirsutism.     Homa Rivas RN on 11/29/2021 at 1:40 PM

## 2022-01-06 ENCOUNTER — THERAPY VISIT (OUTPATIENT)
Dept: PHYSICAL THERAPY | Facility: CLINIC | Age: 42
End: 2022-01-06
Attending: OBSTETRICS & GYNECOLOGY
Payer: COMMERCIAL

## 2022-01-06 DIAGNOSIS — N81.4 UTEROVAGINAL PROLAPSE: ICD-10-CM

## 2022-01-06 DIAGNOSIS — N81.89 PELVIC FLOOR WEAKNESS IN FEMALE: Primary | ICD-10-CM

## 2022-01-06 DIAGNOSIS — N81.6 RECTOCELE: ICD-10-CM

## 2022-01-06 DIAGNOSIS — R27.8 MUSCULAR INCOORDINATION: ICD-10-CM

## 2022-01-06 DIAGNOSIS — N81.11 CYSTOCELE, MIDLINE: ICD-10-CM

## 2022-01-06 PROBLEM — K62.3 RECTAL PROLAPSE: Status: ACTIVE | Noted: 2021-11-22

## 2022-01-06 PROCEDURE — 97112 NEUROMUSCULAR REEDUCATION: CPT | Mod: GP | Performed by: PHYSICAL THERAPIST

## 2022-01-06 PROCEDURE — 97535 SELF CARE MNGMENT TRAINING: CPT | Mod: GP | Performed by: PHYSICAL THERAPIST

## 2022-01-06 PROCEDURE — 97161 PT EVAL LOW COMPLEX 20 MIN: CPT | Mod: GP | Performed by: PHYSICAL THERAPIST

## 2022-01-06 NOTE — PROGRESS NOTES
Physical Therapy Initial Evaluation  Subjective:  The history is provided by the patient. No  was used.   Therapist Generated HPI Evaluation  Problem details: Pt notes that after the birth of her 2nd child, she started to notice more prolapse sx's. [facility]. In terms of sx's, if she jumps, she will get urine leakage. Doesn't feel like she completely empties with her bladder and has more frequency than she used to. Feels she's limited in terms of her physical activities such as running because there's so much pressure there. Periods are very painful and achy in the perineal area. 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 Nothing has actually protruded out but is right at the opening at times. Tends to be more when she's on her feet and done strenuous activities. Will have to shift with bowel movements due to prolapse..         Type of problem:  Pelvic dysfunction and incontinence.    This is a new condition.  Condition occurred with:  Insidious onset and after pregnancy.    Site of Pain: pressue in pubic and perineal areas.  Pain is described as aching and is intermittent.  Pain is worse in the P.M..  Since onset symptoms are unchanged.  Symptoms are exacerbated by jumping and running  and relieved by nothing.      Restrictions due to condition include:  Working in normal job without restrictions.  Barriers include:  None as reported by patient.    Patient Health History         Pain is reported as 0/10 on pain scale.  General health as reported by patient is excellent.  Pertinent medical history includes: depression.   Red flags:  None as reported by patient.  Medical allergies: none.   Surgeries include:  None.     Other medications details: Acyclovir, spironolactone.    Current occupation is RN/student.                                     SUBJECTIVE:      Verbal permission from patient to do internal pelvis muscle assessment?Yes  Verbal permission to complete external perineal assessment? Yes Would you like someone else in the room as a chaperone? no      Urination:  Do you leak on the way to the bathroom or with a strong urge to void? No   Do you leak with cough,sneeze, jumping, running?Yes, jumping running, strenous activities  Any other activities that cause leaking? No   Do you have triggers that make you feel you can't wait to go to the bathroom? No what are they NA.  Type of pad and number used per day? None  When you leak what is the amount? Small  How long can you delay the need to urinate? 3-4 hours. Goes to the bathroom every 2-3 hours on average  How many times do you get up to urinate at night? 0   Can you stop the flow of urine when on the toilet? Yes  Is the volume of urine passed usually: medium. (8sec rule= 250ml with average bladder storing 400-600ml)  Do you feel empty when you are done? No  Do you strain to pass urine? No  Do you have a slow or hesitant urinary stream? Yes  Do you have difficulty initiating the urine stream? No  Is urination painful? No  How many bladder infections have you had in last 12 months? 0  Fluid intake(one glass is 8oz or one cup) 6-8glasses/day, 1-2 Coffee, diet coke caffinated glasses/day  0 alcohol glasses/day.    Bowel habits:  Frequency of bowel movements? 7 times a week  Consistancy of stool? soft formed, Hernando Stool Scale 3-4  Do you ignore the urge to defecate? No  Do you strain to pass stool? Yes but more shifting than straining    Aggrevating factors:  Is loss of stool associated with an activity (lifting, coughing, running) or a food)  No  Are there any foods that increase or decrease your symptoms?  No  Do you have any food allergies?  No      Sensation:   Can you tell if there is solid, liquid, or gas in the rectum?  Yes  Do you feel the urge to move your bowels?  Yes  Is the urge very strong and difficult to control? Yes Or weak/absent? No  Do you feel the rectum is empty with you  finish a bowel movement?   Yes    Do you have abdominal pain?  No but sometimes it just feels full  Describe the quality of the pain: Fullness  What makes your abdominal pain worse? strenuous activities and end of the day  What makes your abdominal pain better? nothing  Do you ever have pain that wakes you at night? no    Do you have rectal pain, pressure, or burning?  Yes, hemorrhoids, burning with defecation.     Pelvic Pain:  Do you have any pelvic pain with intercourse, exams, use of tampons? No painful but tampons don't stay in and fall out.  Is initial penetration during intercourse painful? No  Is deeper penetration painful? No  Do you use lubricant? No What kind? NA  ?  Given birth? Yes Any complications? No  # of vaginal delieveries? 3  # of C-sections?0  # of episiotomies?1, grade 2.  Are you sexually active?Yes  Have you ever been worried for your physical safety? No    Do you have any depression, anxiety, panic attacks, excessive stress?  No  Any abdominal or pelvic surgeries? No  Are you having any regular exercise?Yes, Y, bike, elliptical  Have you practiced the PF(kegel) exercises for 4 or more weeks? No  Thyroid checked? No (related to hair loss, flu-like symptoms, wt gain/loss, fatigue, menopause)  Changed diet lately? No    OBSERVATION  Lumbar Posture: Negative  Pelvic symmetry: Negative  Introitus: loose and descended perineal body       ROM  Single leg standing unilateral hip flexion PSIS:Positive for poor load transfer  Standing forward flexion PSIS:Negative  Passive Hip ROM:Negative  KRISTIE:Negative  KRISTIE with OP:Negative    MUSCLE PERFORMANCE  Active SLR:Positive for poor load transfer and weight shift  Baseline PF tone:hypo  PF Tone with cough: bulge  Valsalva: bulge  PF Response quality: sluggish  PF Power: Center: 2 Stronger on right/left left.  Endurance: Maximum contraction in seconds: 2  # of endurance contractions before fatigue: 2  Quick contraction repetitions prior to fatigue:  3.  Specificity/accessory muscles: overused adductors initially and unable to coordinate contraction witih abdominals, Synergy with abdominals: unable.  Prolapse: Cyctocele/Rectocele/Uterine grade: prolapse of bladder and rectum to hymenal remants      PALPATION: Pain: Denies pain to palpation but poor muscle bulk noted throughout.      Objective:  System                                 Pelvic Dysfunction Evaluation:        Flexibility:      Tightness not present at:  Adductors; Iliopsoas; Hamstrings; Piriformis or Gluteals    Abdominal Wall:  Abdominal wall pelvic: slight tension noted in lower abdominal region.        Pelvic Clock Exam:    Ischiocavernosis pain:  -  Bulbocavernosis pain:  -  Transverse Perineal:  -  Levator ANI:  -  Perineal Body:  -      External Assessment:  External assessment pelvic: slight gapping introitus.      Bearing Down/Coughing:  Cystocele and rectocele      Muscle Contraction/Perineal Mobility:  Slight lift, no urogential triangle descent and substitution  Internal Assessment:    Sensory Exam:  Normal  Contraction/Grade:  Weak squeeze, 2 second hold (2)                               General     ROS    Assessment/Plan:    Patient is a 41 year old female with pelvic complaints.    Patient has the following significant findings with corresponding treatment plan.                Diagnosis 1:  Pelvic organ prolapse with pelvic floor weakness and dysfunction  Pain -  manual therapy, self management, education and home program  Decreased strength - therapeutic exercise, therapeutic activities and home program  Impaired muscle performance - biofeedback, neuro re-education and home program  Decreased function - therapeutic activities and home program    Therapy Evaluation Codes:   1) History comprised of:   Personal factors that impact the plan of care:      None.    Comorbidity factors that impact the plan of care are:      Depression.     Medications impacting care: acyclovir,  spironolactone.  2) Examination of Body Systems comprised of:   Body structures and functions that impact the plan of care:      Pelvis.   Activity limitations that impact the plan of care are:      Jumping, Lifting, Sports, Standing and prolapse.  3) Clinical presentation characteristics are:   Stable/Uncomplicated.  4) Decision-Making    Low complexity using standardized patient assessment instrument and/or measureable assessment of functional outcome.  Cumulative Therapy Evaluation is: Low complexity.    Previous and current functional limitations:  (See Goal Flow Sheet for this information)    Short term and Long term goals: (See Goal Flow Sheet for this information)     Communication ability:  Patient appears to be able to clearly communicate and understand verbal and written communication and follow directions correctly.  Treatment Explanation - The following has been discussed with the patient:   RX ordered/plan of care  Anticipated outcomes  Possible risks and side effects  This patient would benefit from PT intervention to resume normal activities.   Rehab potential is good.    Frequency:  1 X week, once daily  Duration:  for 6-8 weeks  Discharge Plan:  Achieve all LTG.  Independent in home treatment program.  Reach maximal therapeutic benefit.    Please refer to the daily flowsheet for treatment today, total treatment time and time spent performing 1:1 timed codes.

## 2022-01-11 ENCOUNTER — THERAPY VISIT (OUTPATIENT)
Dept: PHYSICAL THERAPY | Facility: CLINIC | Age: 42
End: 2022-01-11
Payer: COMMERCIAL

## 2022-01-11 DIAGNOSIS — N81.11 CYSTOCELE, MIDLINE: ICD-10-CM

## 2022-01-11 DIAGNOSIS — N81.89 PELVIC FLOOR WEAKNESS IN FEMALE: ICD-10-CM

## 2022-01-11 DIAGNOSIS — R27.8 MUSCULAR INCOORDINATION: ICD-10-CM

## 2022-01-11 DIAGNOSIS — K62.3 RECTAL PROLAPSE: ICD-10-CM

## 2022-01-11 PROCEDURE — 97110 THERAPEUTIC EXERCISES: CPT | Mod: GP | Performed by: PHYSICAL THERAPIST

## 2022-01-11 PROCEDURE — 97112 NEUROMUSCULAR REEDUCATION: CPT | Mod: GP | Performed by: PHYSICAL THERAPIST

## 2022-01-18 ENCOUNTER — THERAPY VISIT (OUTPATIENT)
Dept: PHYSICAL THERAPY | Facility: CLINIC | Age: 42
End: 2022-01-18
Payer: COMMERCIAL

## 2022-01-18 ENCOUNTER — VIRTUAL VISIT (OUTPATIENT)
Dept: FAMILY MEDICINE | Facility: CLINIC | Age: 42
End: 2022-01-18
Payer: COMMERCIAL

## 2022-01-18 DIAGNOSIS — F33.1 MODERATE RECURRENT MAJOR DEPRESSION (H): ICD-10-CM

## 2022-01-18 DIAGNOSIS — R27.8 MUSCULAR INCOORDINATION: ICD-10-CM

## 2022-01-18 DIAGNOSIS — N81.89 PELVIC FLOOR WEAKNESS IN FEMALE: ICD-10-CM

## 2022-01-18 DIAGNOSIS — K62.3 RECTAL PROLAPSE: ICD-10-CM

## 2022-01-18 DIAGNOSIS — L68.0 HIRSUTISM: Primary | ICD-10-CM

## 2022-01-18 DIAGNOSIS — N81.11 CYSTOCELE, MIDLINE: ICD-10-CM

## 2022-01-18 PROCEDURE — 97110 THERAPEUTIC EXERCISES: CPT | Mod: GP | Performed by: PHYSICAL THERAPIST

## 2022-01-18 PROCEDURE — 99213 OFFICE O/P EST LOW 20 MIN: CPT | Mod: 95 | Performed by: NURSE PRACTITIONER

## 2022-01-18 PROCEDURE — 97112 NEUROMUSCULAR REEDUCATION: CPT | Mod: GP | Performed by: PHYSICAL THERAPIST

## 2022-01-18 NOTE — PROGRESS NOTES
Germania is a 41 year old who is being evaluated via a billable video visit.      How would you like to obtain your AVS? MyChart  If the video visit is dropped, the invitation should be resent by: Text to cell phone: [phone]  Will anyone else be joining your video visit? No     Video Start Time: 8:49 AM    Assessment & Plan     Hirsutism  No improvement - will increase dose after 3 months on therapy.  Mychart message if no improvement.  Continue at same dose.  Check labs in 1-2 months.    - Basic metabolic panel  (Ca, Cl, CO2, Creat, Gluc, K, Na, BUN)    Moderate recurrent major depression (H)  Stable - stopped Wellbutrin. Discussed alternative medication options for anxiety/depression.          Patient Instructions   Discussed Spironolactone dosing - will see if symptoms over improve over the next few months.  If not will [employer] message with update and may increase to 100 mg twice daily.    Also, discussed alternative to Wellbutrin (stopped this) for anxiety/depression.  Can try Sertraline (Zoloft) or Citalopram (Celexa).    Follow up as problems    Labs ordered.  LONNY Kelly              No follow-ups on file.    Alexandra Hart NP  [facility]    Subjective   Germania is a 41 year old who presents for the following health issues     HPI     Medication recheck - Spironolactone.      How many servings of fruits and vegetables do you eat daily?  2-3    On average, how many sweetened beverages do you drink each day (Examples: soda, juice, sweet tea, etc.  Do NOT count diet or artificially sweetened beverages)?   1    How many days per week do you exercise enough to make your heart beat faster? 3 or less    How many minutes a day do you exercise enough to make your heart beat faster? 20 - 29    How many days per week do you miss taking your medication? 0    Depression Followup    How are you doing with your depression since your last visit? Improved - less     Are you having other  symptoms that might be associated with depression? No    Have you had a significant life event?  No     Are you feeling anxious or having panic attacks?   No    Do you have any concerns with your use of alcohol or other drugs? No    Social History     Tobacco Use     Smoking status: Never Smoker     Smokeless tobacco: Never Used   Vaping Use     Vaping Use: Never used   Substance Use Topics     Alcohol use: Yes     Alcohol/week: 0.0 standard drinks     Comment: rare     Drug use: No     PHQ 8/18/2020 10/29/2021 11/22/2021   PHQ-9 Total Score 2 0 3   Q9: Thoughts of better off dead/self-harm past 2 weeks Not at all Not at all Not at all     NOAM-7 SCORE 8/18/2020 10/29/2021 11/22/2021   Total Score - 3 (minimal anxiety) -   Total Score 3 3 3     Last PHQ-9 11/22/2021   1.  Little interest or pleasure in doing things 1   2.  Feeling down, depressed, or hopeless 1   3.  Trouble falling or staying asleep, or sleeping too much 0   4.  Feeling tired or having little energy 0   5.  Poor appetite or overeating 0   6.  Feeling bad about yourself 1   7.  Trouble concentrating 0   8.  Moving slowly or restless 0   Q9: Thoughts of better off dead/self-harm past 2 weeks 0   PHQ-9 Total Score 3   Difficulty at work, home, or with people Not difficult at all     NOAM-7  11/22/2021   1. Feeling nervous, anxious, or on edge 1   2. Not being able to stop or control worrying 0   3. Worrying too much about different things 1   4. Trouble relaxing 1   5. Being so restless that it is hard to sit still 0   6. Becoming easily annoyed or irritable 0   7. Feeling afraid, as if something awful might happen 0   NOAM-7 Total Score 3   If you checked any problems, how difficult have they made it for you to do your work, take care of things at home, or get along with other people? Not difficult at all       Suicide Assessment Five-step Evaluation and Treatment (SAFE-T)      How many servings of fruits and vegetables do you eat daily?  2-3    On  average, how many sweetened beverages do you drink each day (Examples: soda, juice, sweet tea, etc.  Do NOT count diet or artificially sweetened beverages)?   0    How many days per week do you exercise enough to make your heart beat faster? 4    How many minutes a day do you exercise enough to make your heart beat faster? 10 - 19    How many days per week do you miss taking your medication? 0      Review of Systems   Constitutional, HEENT, cardiovascular, pulmonary, GI, , musculoskeletal, neuro, skin, endocrine and psych systems are negative, except as otherwise noted.      Objective           Vitals:  No vitals were obtained today due to virtual visit.    Physical Exam   GENERAL: Healthy, alert and no distress  EYES: Eyes grossly normal to inspection.  No discharge or erythema, or obvious scleral/conjunctival abnormalities.  RESP: No audible wheeze, cough, or visible cyanosis.  No visible retractions or increased work of breathing.    SKIN: Visible skin clear. No significant rash, abnormal pigmentation or lesions.  NEURO: Cranial nerves grossly intact.  Mentation and speech appropriate for age.  PSYCH: Mentation appears normal, affect normal/bright, judgement and insight intact, normal speech and appearance well-groomed.            Video-Visit Details    Type of service:  Video Visit    Video End Time:9:05 am    Originating Location (pt. Location): Home    Distant Location (provider location):  [facility]     Platform used for Video Visit: [employer]

## 2022-01-18 NOTE — PATIENT INSTRUCTIONS
Discussed Spironolactone dosing - will see if symptoms over improve over the next few months.  If not will Mychart message with update and may increase to 100 mg twice daily.    Also, discussed alternative to Wellbutrin (stopped this) for anxiety/depression.  Can try Sertraline (Zoloft) or Citalopram (Celexa).    Follow up as problems    Labs ordered.  LONNY Kelly

## 2022-01-25 NOTE — PROGRESS NOTES
ERP at bedside for re-eval.    Please call with results and let her know they are normal.  Thanks!    Radha Pool MD, MPH

## 2022-01-31 ENCOUNTER — MYC MEDICAL ADVICE (OUTPATIENT)
Dept: FAMILY MEDICINE | Facility: CLINIC | Age: 42
End: 2022-01-31
Payer: COMMERCIAL

## 2022-01-31 DIAGNOSIS — L68.0 HIRSUTISM: Primary | ICD-10-CM

## 2022-01-31 DIAGNOSIS — F33.1 MODERATE RECURRENT MAJOR DEPRESSION (H): ICD-10-CM

## 2022-02-01 ENCOUNTER — THERAPY VISIT (OUTPATIENT)
Dept: PHYSICAL THERAPY | Facility: CLINIC | Age: 42
End: 2022-02-01
Payer: COMMERCIAL

## 2022-02-01 DIAGNOSIS — N81.11 CYSTOCELE, MIDLINE: ICD-10-CM

## 2022-02-01 DIAGNOSIS — N81.89 PELVIC FLOOR WEAKNESS IN FEMALE: ICD-10-CM

## 2022-02-01 DIAGNOSIS — R27.8 MUSCULAR INCOORDINATION: ICD-10-CM

## 2022-02-01 DIAGNOSIS — K62.3 RECTAL PROLAPSE: ICD-10-CM

## 2022-02-01 PROCEDURE — 97112 NEUROMUSCULAR REEDUCATION: CPT | Mod: GP | Performed by: PHYSICAL THERAPIST

## 2022-02-01 PROCEDURE — 97530 THERAPEUTIC ACTIVITIES: CPT | Mod: GP | Performed by: PHYSICAL THERAPIST

## 2022-02-01 RX ORDER — CITALOPRAM HYDROBROMIDE 10 MG/1
10 TABLET ORAL DAILY
Qty: 30 TABLET | Refills: 0 | Status: SHIPPED | OUTPATIENT
Start: 2022-02-01 | End: 2022-02-22

## 2022-02-01 RX ORDER — SPIRONOLACTONE 100 MG/1
100 TABLET, FILM COATED ORAL 2 TIMES DAILY
Qty: 60 TABLET | Refills: 11 | Status: SHIPPED | OUTPATIENT
Start: 2022-02-01 | End: 2022-07-28

## 2022-02-01 NOTE — PROGRESS NOTES
Subjective:  HPI  Physical Exam                    Objective:  System    Physical Exam    General     ROS    Assessment/Plan:    PROGRESS  REPORT    Progress reporting period is from 1/6/22 to 2/1/22.       SUBJECTIVE  Subjective changes noted by patient:  Pt reports that there may be some slight improvement in her sx's but nothing drastic. Feels like her urine stream may have changed a little bit but doesn't know how to describe the change. Doesn't feel like she has to shift quite as much with bowel movements. Exercises are going well and feels like they are doable however only getting to them about 3-4 times/wk. Does feel like she's getting stronger and getting more control of the PF muscles.     Changes in function:  Yes, but overall minimal  Adverse reaction to treatment or activity: None    OBJECTIVE  Changes noted in objective findings:  Yes, MMT: 2/5 with decreased contraction on the R. Pt continues to overuse adductors with attempt at jed the pelvic floor. Able to contract TA well however with TA contraction, bulge of the PF felt indicating poor coordination of the PF muscles yet.        ASSESSMENT/PLAN  Updated problem list and treatment plan: Diagnosis 1:  Pelvic organ prolapse with pelvic floor weakness and dysfunction. Pt has not made significant changes in sx's or objective measure of strength and coordination. Did discuss use of proprioceptive devices today as well as increasing frequency of HEP and importance in doing these regularly.  Decreased strength - therapeutic exercise, therapeutic activities and home program  Impaired muscle performance - biofeedback, neuro re-education and home program  Decreased function - therapeutic activities and home program  STG/LTGs have been met or progress has been made towards goals:  Yes, but minimal  Assessment of Progress: The patient's condition has potential to improve.  Self Management Plans:  Patient has been instructed in a home treatment  program.  Patient  has been instructed in self management of symptoms.  I have re-evaluated this patient and find that the nature, scope, duration and intensity of the therapy is appropriate for the medical condition of the patient.  Germania continues to require the following intervention to meet STG and LTG's:  PT    Recommendations:  This patient would benefit from continued therapy.     Frequency:  1 X every other week, once daily  Duration:  for 2-3 months        Please refer to the daily flowsheet for treatment today, total treatment time and time spent performing 1:1 timed codes.

## 2022-02-02 ENCOUNTER — LAB (OUTPATIENT)
Dept: LAB | Facility: CLINIC | Age: 42
End: 2022-02-02
Payer: COMMERCIAL

## 2022-02-02 DIAGNOSIS — L68.0 HIRSUTISM: ICD-10-CM

## 2022-02-02 LAB
ANION GAP SERPL CALCULATED.3IONS-SCNC: 5 MMOL/L (ref 3–14)
BUN SERPL-MCNC: 19 MG/DL (ref 7–30)
CALCIUM SERPL-MCNC: 9.3 MG/DL (ref 8.5–10.1)
CHLORIDE BLD-SCNC: 105 MMOL/L (ref 94–109)
CO2 SERPL-SCNC: 29 MMOL/L (ref 20–32)
CREAT SERPL-MCNC: 0.74 MG/DL (ref 0.52–1.04)
GFR SERPL CREATININE-BSD FRML MDRD: >90 ML/MIN/1.73M2
GLUCOSE BLD-MCNC: 86 MG/DL (ref 70–99)
POTASSIUM BLD-SCNC: 4.3 MMOL/L (ref 3.4–5.3)
SODIUM SERPL-SCNC: 139 MMOL/L (ref 133–144)

## 2022-02-02 PROCEDURE — 36415 COLL VENOUS BLD VENIPUNCTURE: CPT

## 2022-02-02 PROCEDURE — 80048 BASIC METABOLIC PNL TOTAL CA: CPT

## 2022-02-02 NOTE — LETTER
February 4, 2022      Germania Valverdesai  [address]  [place] [address]        Dear ,    We are writing to inform you of your test results.    Your test results fall within the expected range(s) or remain unchanged from previous results.  Please continue with current treatment plan.    Resulted Orders   Basic metabolic panel  (Ca, Cl, CO2, Creat, Gluc, K, Na, BUN)   Result Value Ref Range    Sodium 139 133 - 144 mmol/L    Potassium 4.3 3.4 - 5.3 mmol/L    Chloride 105 94 - 109 mmol/L    Carbon Dioxide (CO2) 29 20 - 32 mmol/L    Anion Gap 5 3 - 14 mmol/L    Urea Nitrogen 19 7 - 30 mg/dL    Creatinine 0.74 0.52 - 1.04 mg/dL    Calcium 9.3 8.5 - 10.1 mg/dL    Glucose 86 70 - 99 mg/dL    GFR Estimate >90 >60 mL/min/1.73m2      Comment:      Effective December 21, 2021 eGFRcr in adults is calculated using the 2021 CKD-EPI creatinine equation which includes age and gender (Ramya et al., NEJM, DOI: 10.1056/ZIFUjg5350609)       If you have any questions or concerns, please call the clinic at the number listed above.       Sincerely,      Alexandra Hart NP

## 2022-02-22 ENCOUNTER — VIRTUAL VISIT (OUTPATIENT)
Dept: FAMILY MEDICINE | Facility: CLINIC | Age: 42
End: 2022-02-22
Payer: COMMERCIAL

## 2022-02-22 DIAGNOSIS — F33.1 MODERATE RECURRENT MAJOR DEPRESSION (H): Primary | ICD-10-CM

## 2022-02-22 DIAGNOSIS — F43.23 SITUATIONAL MIXED ANXIETY AND DEPRESSIVE DISORDER: ICD-10-CM

## 2022-02-22 PROCEDURE — 99213 OFFICE O/P EST LOW 20 MIN: CPT | Mod: 95 | Performed by: NURSE PRACTITIONER

## 2022-02-22 RX ORDER — CITALOPRAM HYDROBROMIDE 10 MG/1
10 TABLET ORAL DAILY
Qty: 30 TABLET | Refills: 6 | Status: SHIPPED | OUTPATIENT
Start: 2022-02-22 | End: 2022-04-12 | Stop reason: SINTOL

## 2022-02-22 ASSESSMENT — ANXIETY QUESTIONNAIRES
IF YOU CHECKED OFF ANY PROBLEMS ON THIS QUESTIONNAIRE, HOW DIFFICULT HAVE THESE PROBLEMS MADE IT FOR YOU TO DO YOUR WORK, TAKE CARE OF THINGS AT HOME, OR GET ALONG WITH OTHER PEOPLE: SOMEWHAT DIFFICULT
GAD7 TOTAL SCORE: 8
6. BECOMING EASILY ANNOYED OR IRRITABLE: MORE THAN HALF THE DAYS
2. NOT BEING ABLE TO STOP OR CONTROL WORRYING: SEVERAL DAYS
7. FEELING AFRAID AS IF SOMETHING AWFUL MIGHT HAPPEN: NOT AT ALL
1. FEELING NERVOUS, ANXIOUS, OR ON EDGE: SEVERAL DAYS
5. BEING SO RESTLESS THAT IT IS HARD TO SIT STILL: NOT AT ALL
3. WORRYING TOO MUCH ABOUT DIFFERENT THINGS: MORE THAN HALF THE DAYS

## 2022-02-22 ASSESSMENT — PATIENT HEALTH QUESTIONNAIRE - PHQ9
SUM OF ALL RESPONSES TO PHQ QUESTIONS 1-9: 7
5. POOR APPETITE OR OVEREATING: MORE THAN HALF THE DAYS

## 2022-02-22 ASSESSMENT — ENCOUNTER SYMPTOMS: NERVOUS/ANXIOUS: 1

## 2022-02-22 NOTE — PATIENT INSTRUCTIONS
Zariat me in 2-3 weeks if needing adjustment in dosing - would recommend 15 mg - 20 mg dose increase if continued issues with anxiety.    Refilled Citalopram today.    Alexandra Hart, LONNY      Patient Education     Adjustment Disorder  Life changes--work, family, parents, children--each can cause a great deal of stress in life. An adjustment disorder means you have trouble dealing with change and stress. This problem can have serious results. You may feel helpless, depressed, make bad decisions, or even feel like you want to hurt yourself.   Adjustment disorder can cause anxiety or depression. It's triggered by stresses such as:     Death of a loved one    Divorce    Marriage    General life changes such as changing or leaving a job    Moving    Illness or other health issue for you or a family member    Sex    Money  Symptoms may include:    Sadness or crying    Anxiety    Insomnia    Poor concentration    Trouble doing simple things    New problems at work or with family or friends    Loss of self-esteem    Sense of hopelessness    Feeling trapped or cut off from others  With this condition, it's common to feel sad, guilty, hopeless, and restless. These feelings may continue for weeks or months. It can be helpful to identify what's causing the additional stress and take steps to get extra support. If new stressful events don't happen, it's likely that you will gradually start feeling better.   Home care    If you have been given a prescription for medicine, take it as directed.    It helps to talk about your feelings and thoughts with family or friends who understand and support you.    Follow-up care  Follow up with your healthcare provider, or therapist as advised. Let them know if this condition doesn't improve or gets worse.   When to seek medical advice  Call your healthcare provider right away if any of these happen:    Worsening depression or anxiety    Feeling out of control    Thoughts of harming  yourself or another    Being unable to care for yourself  [employer]ake last reviewed this educational content on 4/1/2020 2000-2021 [employer]. All rights reserved. This information is not intended as a substitute for professional medical care. Always follow your healthcare professional's instructions.

## 2022-02-22 NOTE — PROGRESS NOTES
Germania is a 41 year old who is being evaluated via a billable video visit.      How would you like to obtain your AVS? MyChart  If the video visit is dropped, the invitation should be resent by: Text to cell phone: [phone]  Will anyone else be joining your video visit? No     Video Start Time: 8:45 AM    Assessment & Plan     Moderate recurrent major depression (H)  Some improvement.  Not a goal < 5 .  Continue for another 2-3 weeks and mychart me with update.  Continue same dose for now.    - citalopram (CELEXA) 10 MG tablet  Dispense: 30 tablet; Refill: 6    Situational mixed anxiety and depressive disorder  See AVS.  Initially had some sleep SE - those have improved some.  Continue to monitor SE update me in 2-3 weeks.       See Patient Instructions    Return in about 3 months (around 5/22/2022) for Recheck symptoms, Medication Follow up.    Alexandra Hart NP  [facility]    Subjective   Germania is a 41 year old who presents for the following health issues     Anxiety         Depression and Anxiety Follow-Up    How are you doing with your depression since your last visit? No change    How are you doing with your anxiety since your last visit?  No change    Are you having other symptoms that might be associated with depression or anxiety? No    Have you had a significant life event? No     Do you have any concerns with your use of alcohol or other drugs? No     Started Citalopram 10 mg in am - was having problems sleeping the first 1-2 weeks.  Sleep has improved in the past 5 days.    Social History     Tobacco Use     Smoking status: Never Smoker     Smokeless tobacco: Never Used   Vaping Use     Vaping Use: Never used   Substance Use Topics     Alcohol use: Yes     Alcohol/week: 0.0 standard drinks     Comment: rare     Drug use: No     PHQ 10/29/2021 11/22/2021 2/22/2022   PHQ-9 Total Score 0 3 7   Q9: Thoughts of better off dead/self-harm past 2 weeks Not at all Not at all Not at  all     NOAM-7 SCORE 10/29/2021 11/22/2021 2/22/2022   Total Score 3 (minimal anxiety) - -   Total Score 3 3 8     Last PHQ-9 2/22/2022   1.  Little interest or pleasure in doing things 1   2.  Feeling down, depressed, or hopeless 1   3.  Trouble falling or staying asleep, or sleeping too much 2   4.  Feeling tired or having little energy 1   5.  Poor appetite or overeating 0   6.  Feeling bad about yourself 1   7.  Trouble concentrating 1   8.  Moving slowly or restless 0   Q9: Thoughts of better off dead/self-harm past 2 weeks 0   PHQ-9 Total Score 7   Difficulty at work, home, or with people Somewhat difficult     NOAM-7  2/22/2022   1. Feeling nervous, anxious, or on edge 1   2. Not being able to stop or control worrying 1   3. Worrying too much about different things 2   4. Trouble relaxing 2   5. Being so restless that it is hard to sit still 0   6. Becoming easily annoyed or irritable 2   7. Feeling afraid, as if something awful might happen 0   NOAM-7 Total Score 8   If you checked any problems, how difficult have they made it for you to do your work, take care of things at home, or get along with other people? Somewhat difficult       Suicide Assessment Five-step Evaluation and Treatment (SAFE-T)       How many servings of fruits and vegetables do you eat daily?  4-5    On average, how many sweetened beverages do you drink each day (Examples: soda, juice, sweet tea, etc.  Do NOT count diet or artificially sweetened beverages)?   0    How many days per week do you exercise enough to make your heart beat faster? 3 or less    How many minutes a day do you exercise enough to make your heart beat faster? 20 - 29    How many days per week do you miss taking your medication? 0        Review of Systems   Psychiatric/Behavioral: The patient is nervous/anxious.       Constitutional, HEENT, cardiovascular, pulmonary, GI, , musculoskeletal, neuro, skin, endocrine and psych systems are negative, except as otherwise  noted.      Objective           Vitals:  No vitals were obtained today due to virtual visit.    Physical Exam   GENERAL: Healthy, alert and no distress  EYES: Eyes grossly normal to inspection.  No discharge or erythema, or obvious scleral/conjunctival abnormalities.  RESP: No audible wheeze, cough, or visible cyanosis.  No visible retractions or increased work of breathing.    SKIN: Visible skin clear. No significant rash, abnormal pigmentation or lesions.  NEURO: Cranial nerves grossly intact.  Mentation and speech appropriate for age.  PSYCH: Mentation appears normal, affect normal/bright, judgement and insight intact, normal speech and appearance well-groomed.       Video-Visit Details    Type of service:  Video Visit    Video End Time:8:56 AM    Originating Location (pt. Location): Home    Distant Location (provider location):  [facility]     Platform used for Video Visit: [employer]

## 2022-02-23 ASSESSMENT — ANXIETY QUESTIONNAIRES: GAD7 TOTAL SCORE: 8

## 2022-02-28 ENCOUNTER — THERAPY VISIT (OUTPATIENT)
Dept: PHYSICAL THERAPY | Facility: CLINIC | Age: 42
End: 2022-02-28
Payer: COMMERCIAL

## 2022-02-28 DIAGNOSIS — K62.3 RECTAL PROLAPSE: ICD-10-CM

## 2022-02-28 DIAGNOSIS — N81.89 PELVIC FLOOR WEAKNESS IN FEMALE: ICD-10-CM

## 2022-02-28 DIAGNOSIS — R27.8 MUSCULAR INCOORDINATION: ICD-10-CM

## 2022-02-28 DIAGNOSIS — N81.11 CYSTOCELE, MIDLINE: ICD-10-CM

## 2022-02-28 PROCEDURE — 97112 NEUROMUSCULAR REEDUCATION: CPT | Mod: GP | Performed by: PHYSICAL THERAPIST

## 2022-02-28 PROCEDURE — 97530 THERAPEUTIC ACTIVITIES: CPT | Mod: GP | Performed by: PHYSICAL THERAPIST

## 2022-02-28 NOTE — PROGRESS NOTES
Subjective:  HPI  Physical Exam                    Objective:  System    Physical Exam    General     ROS    Assessment/Plan:    PROGRESS  REPORT    Progress reporting period is from 1/6/22 to 2/28/22.       SUBJECTIVE  Subjective changes noted by patient:  Patient hasn't been in in over a month due to her schedule. Does note that overall her sx's are a little better in that when she's using tampons, the tampon is staying in better. Notes that during her period she does still notice the descent of the tissue. In terms of her exercises, she has only gotten to them about 2-3 times/wk however has been working on some of the kegels throughout the day. Notes that there are still times where she feels her stream isn't real strong. Leakage hasn't been a problem at this point at all. Notes that she has been shifting less with bowel movements as well.       Changes in function:  Yes, but minimal improvement  Adverse reaction to treatment or activity: None    OBJECTIVE  Changes noted in objective findings:  Pt consented to internal muscle assessment. Pt continues to have prolapse of anterior vaginal wall to level of hymen. Poor ability to contract PF. Initially when asked for a contraction, pt actually beared down. MMT was 1/5 today. Trialed engaging TA with PF and again bulge noted in PF. Poor coordination of TA as well noted. With changes in cuing and tapping through levator ani was able to generate a small more consistent contraction.        ASSESSMENT/PLAN  Updated problem list and treatment plan: Diagnosis 1:  Pelvic floor weakness with prolapse  Decreased ROM/flexibility - manual therapy, therapeutic exercise, therapeutic activity and home program  Decreased strength - therapeutic exercise, therapeutic activities and home program  Impaired muscle performance - biofeedback, neuro re-education and home program  Decreased function - therapeutic activities and home program  STG/LTGs have been met or progress has been made  towards goals:  Yes (See Goal flow sheet completed today.)  Assessment of Progress: The patient's condition has potential to improve.  Self Management Plans:  Patient has been instructed in a home treatment program.  Patient  has been instructed in self management of symptoms.  I have re-evaluated this patient and find that the nature, scope, duration and intensity of the therapy is appropriate for the medical condition of the patient.  Germania continues to require the following intervention to meet STG and LTG's:  PT    Recommendations:  This patient would benefit from continued therapy.     Frequency:  1 X every other week, once daily  Duration:  for 2 months        Please refer to the daily flowsheet for treatment today, total treatment time and time spent performing 1:1 timed codes.

## 2022-04-12 ENCOUNTER — OFFICE VISIT (OUTPATIENT)
Dept: FAMILY MEDICINE | Facility: CLINIC | Age: 42
End: 2022-04-12
Payer: COMMERCIAL

## 2022-04-12 VITALS
SYSTOLIC BLOOD PRESSURE: 104 MMHG | WEIGHT: 125.13 LBS | HEIGHT: 65 IN | RESPIRATION RATE: 16 BRPM | OXYGEN SATURATION: 100 % | DIASTOLIC BLOOD PRESSURE: 79 MMHG | HEART RATE: 89 BPM | TEMPERATURE: 98.1 F | BODY MASS INDEX: 20.85 KG/M2

## 2022-04-12 DIAGNOSIS — Z01.818 PRE-OPERATIVE GENERAL PHYSICAL EXAMINATION: Primary | ICD-10-CM

## 2022-04-12 DIAGNOSIS — F33.1 MODERATE RECURRENT MAJOR DEPRESSION (H): ICD-10-CM

## 2022-04-12 DIAGNOSIS — L68.0 HIRSUTISM: ICD-10-CM

## 2022-04-12 DIAGNOSIS — Z11.1 SCREENING EXAMINATION FOR PULMONARY TUBERCULOSIS: ICD-10-CM

## 2022-04-12 DIAGNOSIS — Z41.1 ENCOUNTER FOR COSMETIC SURGERY: ICD-10-CM

## 2022-04-12 PROCEDURE — 99214 OFFICE O/P EST MOD 30 MIN: CPT | Performed by: NURSE PRACTITIONER

## 2022-04-12 RX ORDER — BUPROPION HYDROCHLORIDE 150 MG/1
TABLET ORAL
Qty: 90 TABLET | Refills: 3 | Status: SHIPPED | OUTPATIENT
Start: 2022-04-12 | End: 2022-11-08

## 2022-04-12 RX ORDER — CYCLOBENZAPRINE HCL 5 MG
TABLET ORAL
COMMUNITY
Start: 2022-03-30 | End: 2022-07-28

## 2022-04-12 RX ORDER — CEPHALEXIN 500 MG/1
CAPSULE ORAL
COMMUNITY
Start: 2022-03-30 | End: 2022-07-28

## 2022-04-12 RX ORDER — OXYCODONE AND ACETAMINOPHEN 5; 325 MG/1; MG/1
TABLET ORAL
COMMUNITY
Start: 2022-03-30 | End: 2022-07-28

## 2022-04-12 NOTE — PROGRESS NOTES
[facility]  [address] [address]  Phone: [phone]  Primary Provider: Alyssa Hart  Pre-op Performing Provider: ALYSSA HART       PREOPERATIVE EVALUATION:  Today's date: 4/12/2022    Germania Hayden is a 41 year old female who presents for a preoperative evaluation.    Surgical Information:  Surgery/Procedure: liposuction on left and right ankles and calves.  Surgery Location: [facility]   Surgeon: Dr. Go  Surgery Date: 4/14/22  Time of Surgery: 9:30 am  Where patient plans to recover: At home with family  Fax number for surgical facility: [phone] and [phone]    Type of Anesthesia Anticipated: Local with MAC    Assessment & Plan     The proposed surgical procedure is considered INTERMEDIATE risk.    Pre-operative general physical examination       Encounter for cosmetic surgery       Moderate recurrent major depression (H)   Restart per patient request. Stopped Citalopram due to side effects.    - buPROPion (WELLBUTRIN XL) 150 MG 24 hr tablet  Dispense: 90 tablet; Refill: 3    Hirsutism     - Basic metabolic panel  (Ca, Cl, CO2, Creat, Gluc, K, Na, BUN)    Screening examination for pulmonary tuberculosis   TB testing required per school exam.  - TB INTRADERMAL TEST           Risks and Recommendations:  The patient has the following additional risks and recommendations for perioperative complications:   - No identified additional risk factors other than previously addressed    Medication Instructions:  Patient is to take all scheduled medications on the day of surgery EXCEPT for modifications listed below:  Spironolactone    RECOMMENDATION:  APPROVAL GIVEN to proceed with proposed procedure, without further diagnostic evaluation.          Subjective     HPI related to upcoming procedure: Ankle and calf cosmetic procedure requested - no pain or scarring reported.     Preop Questions 4/12/2022   1. Have you ever had a  heart attack or stroke? No   2. Have you ever had surgery on your heart or blood vessels, such as a stent placement, a coronary artery bypass, or surgery on an artery in your head, neck, heart, or legs? No   3. Do you have chest pain with activity? No   4. Do you have a history of  heart failure? No   5. Do you currently have a cold, bronchitis or symptoms of other infection? No   6. Do you have a cough, shortness of breath, or wheezing? No   7. Do you or anyone in your family have previous history of blood clots? YES - dad but from fall or injury   8. Do you or does anyone in your family have a serious bleeding problem such as prolonged bleeding following surgeries or cuts? No   9. Have you ever had problems with anemia or been told to take iron pills? No   10. Have you had any abnormal blood loss such as black, tarry or bloody stools, or abnormal vaginal bleeding? No   11. Have you ever had a blood transfusion? No   12. Are you willing to have a blood transfusion if it is medically needed before, during, or after your surgery? Yes   13. Have you or any of your relatives ever had problems with anesthesia? No   14. Do you have sleep apnea, excessive snoring or daytime drowsiness? No   15. Do you have any artifical heart valves or other implanted medical devices like a pacemaker, defibrillator, or continuous glucose monitor? No   16. Do you have artificial joints? No   17. Are you allergic to latex? No   18. Is there any chance that you may be pregnant? No       Health Care Directive:  Patient does not have a Health Care Directive or Living Will: Discussed advance care planning with patient; however, patient declined at this time.    Preoperative Review of :   reviewed - no record of controlled substances prescribed.       Status of Chronic Conditions:  See problem list for active medical problems.  Problems all longstanding and stable, except as noted/documented.  See ROS for pertinent symptoms related to  these conditions.      Review of Systems  Constitutional, neuro, ENT, endocrine, pulmonary, cardiac, gastrointestinal, genitourinary, musculoskeletal, integument and psychiatric systems are negative, except as otherwise noted.    Patient Active Problem List    Diagnosis Date Noted     Pelvic floor weakness in female 01/06/2022     Priority: Medium     Muscular incoordination 01/06/2022     Priority: Medium     Rectal prolapse 11/22/2021     Priority: Medium     Cystocele, midline 11/22/2021     Priority: Medium     Moderate recurrent major depression (H) 12/19/2019     Priority: Medium     H/O cold sores 04/19/2018     Priority: Medium     Uterovaginal prolapse 10/27/2017     Priority: Medium     Trial of size 4 ring with support pessary 3/2018 due to 2-3+ cystocele, 2+ cervical decensus, 1+ rectocele with strain  Consider future TOTAL VAGINAL HYSTERECTOMY, A & P, SACROSPINOUS VAULT SUSPENSION  Refit with size 6 ruing with support pessary ;3/29/2019         Vulvar varicose veins 04/21/2014     Priority: Medium      Past Medical History:   Diagnosis Date     ASCUS favor benign 12/2011    neg HPV, repeat Pap + HPV cotesting in 3 years     Chickenpox      Depression      Eating disorder     bulemia- age 18 to 32     Spider veins 5-6 years ago     Uterovaginal prolapse 10/27/2017     Past Surgical History:   Procedure Laterality Date     ARTHROSCOPIC RECONSTRUCTION ANTERIOR CRUCIATE LIGAMENT  x3    twice on L knee and once on Rt knee     ESOPHAGOSCOPY, GASTROSCOPY, DUODENOSCOPY (EGD), COMBINED N/A 10/10/2019    Procedure: ESOPHAGOGASTRODUODENOSCOPY, WITH BIOPSY;  Surgeon: Shirlene Wilson MD;  Location: [facility] OR     MAMMOPLASTY AUGMENTATION Bilateral 2015     wisdom teeth       ZZC BREAST AUGMENTATION       Current Outpatient Medications   Medication Sig Dispense Refill     acyclovir (ZOVIRAX) 200 MG capsule TAKE 1 CAPSULE BY MOUTH TWICE DAILY . 180 capsule 3     cephALEXin (KEFLEX) 500 MG capsule TAKE 1  CAPSULE BY MOUTH THREE TIMES DAILY FOR 7 DAYS       citalopram (CELEXA) 10 MG tablet Take 1 tablet (10 mg) by mouth daily 30 tablet 6     cyclobenzaprine (FLEXERIL) 5 MG tablet TAKE 1 TO 2 TABLETS BY MOUTH EVERY 8 HOURS AS NEEDED       Multiple Vitamins-Minerals (MULTIVITAMIN PO)        oxyCODONE-acetaminophen (PERCOCET) 5-325 MG tablet TAKE 1 TO 2 TABLETS BY MOUTH EVERY 4 TO 6 HOURS AS NEEDED FOR PAIN . DO NOT EXCEED 6 TABLETS PER 24 HOURS       spironolactone (ALDACTONE) 100 MG tablet Take 1 tablet (100 mg) by mouth 2 times daily 60 tablet 11       No Known Allergies     Social History     Tobacco Use     Smoking status: Never Smoker     Smokeless tobacco: Never Used   Substance Use Topics     Alcohol use: Yes     Alcohol/week: 0.0 standard drinks     Comment: rare     Family History   Problem Relation Age of Onset     Cancer Paternal Grandmother 60        ovarian     Breast Cancer Paternal Grandmother 85.00     Cancer Maternal Grandmother         lymphoma     Cancer Maternal Grandfather         lung ,bone     Cardiovascular Paternal Grandfather         MI     Mental Illness Brother         schizoaffective disorder     Depression Mother      Colon Polyps Mother      Gastrointestinal Disease Brother         ulcerative colitis     Melanoma No family hx of      History   Drug Use No         Objective     There were no vitals taken for this visit.    Physical Exam    GENERAL APPEARANCE: healthy, alert and no distress     EYES: EOMI, PERRL     HENT: ear canals and TM's normal and nose and mouth without ulcers or lesions     NECK: no adenopathy, no asymmetry, masses, or scars and thyroid normal to palpation     RESP: lungs clear to auscultation - no rales, rhonchi or wheezes     CV: regular rates and rhythm, normal S1 S2, no S3 or S4 and no murmur, click or rub     ABDOMEN:  soft, nontender, no HSM or masses and bowel sounds normal     MS: extremities normal- no gross deformities noted, no evidence of inflammation in  joints, FROM in all extremities.     SKIN: no suspicious lesions or rashes     NEURO: Normal strength and tone, sensory exam grossly normal, mentation intact and speech normal     PSYCH: mentation appears normal. and affect normal/bright     LYMPHATICS: No cervical adenopathy    Recent Labs   Lab Test 02/02/22  0927      POTASSIUM 4.3   CR 0.74        Diagnostics:  No labs were ordered during this visit.   No EKG required for low risk surgery (cataract, skin procedure, breast biopsy, etc).    Revised Cardiac Risk Index (RCRI):  The patient has the following serious cardiovascular risks for perioperative complications:   - No serious cardiac risks = 0 points     RCRI Interpretation: 0 points: Class I (very low risk - 0.4% complication rate)           Signed Electronically by: Alexandra Hart NP  Copy of this evaluation report is provided to requesting physician.

## 2022-04-13 ENCOUNTER — ALLIED HEALTH/NURSE VISIT (OUTPATIENT)
Dept: FAMILY MEDICINE | Facility: CLINIC | Age: 42
End: 2022-04-13
Payer: COMMERCIAL

## 2022-04-13 ENCOUNTER — LAB (OUTPATIENT)
Dept: LAB | Facility: CLINIC | Age: 42
End: 2022-04-13

## 2022-04-13 DIAGNOSIS — L68.0 HIRSUTISM: ICD-10-CM

## 2022-04-13 DIAGNOSIS — Z11.1 SCREENING EXAMINATION FOR PULMONARY TUBERCULOSIS: ICD-10-CM

## 2022-04-13 LAB
ANION GAP SERPL CALCULATED.3IONS-SCNC: 5 MMOL/L (ref 3–14)
BUN SERPL-MCNC: 22 MG/DL (ref 7–30)
CALCIUM SERPL-MCNC: 9.3 MG/DL (ref 8.5–10.1)
CHLORIDE BLD-SCNC: 103 MMOL/L (ref 94–109)
CO2 SERPL-SCNC: 29 MMOL/L (ref 20–32)
CREAT SERPL-MCNC: 0.71 MG/DL (ref 0.52–1.04)
GFR SERPL CREATININE-BSD FRML MDRD: >90 ML/MIN/1.73M2
GLUCOSE BLD-MCNC: 90 MG/DL (ref 70–99)
POTASSIUM BLD-SCNC: 3.9 MMOL/L (ref 3.4–5.3)
SODIUM SERPL-SCNC: 137 MMOL/L (ref 133–144)

## 2022-04-13 PROCEDURE — 86580 TB INTRADERMAL TEST: CPT

## 2022-04-13 PROCEDURE — 36415 COLL VENOUS BLD VENIPUNCTURE: CPT

## 2022-04-13 PROCEDURE — 80048 BASIC METABOLIC PNL TOTAL CA: CPT

## 2022-04-13 PROCEDURE — 99207 PR NO CHARGE NURSE ONLY: CPT

## 2022-04-13 NOTE — LETTER
April 15, 2022      Germania R Jackelyn  [address]  [place] [address]        Dear ,    We are writing to inform you of your test results.    {results letter list:861560}    Resulted Orders   Basic metabolic panel  (Ca, Cl, CO2, Creat, Gluc, K, Na, BUN)   Result Value Ref Range    Sodium 137 133 - 144 mmol/L    Potassium 3.9 3.4 - 5.3 mmol/L    Chloride 103 94 - 109 mmol/L    Carbon Dioxide (CO2) 29 20 - 32 mmol/L    Anion Gap 5 3 - 14 mmol/L    Urea Nitrogen 22 7 - 30 mg/dL    Creatinine 0.71 0.52 - 1.04 mg/dL    Calcium 9.3 8.5 - 10.1 mg/dL    Glucose 90 70 - 99 mg/dL    GFR Estimate >90 >60 mL/min/1.73m2      Comment:      Effective December 21, 2021 eGFRcr in adults is calculated using the 2021 CKD-EPI creatinine equation which includes age and gender (Ramya et al., NEJM, DOI: 10.1056/OAEHnu2199863)       If you have any questions or concerns, please call the clinic at the number listed above.       Sincerely,      Alexandra Hart NP

## 2022-04-13 NOTE — NURSING NOTE
Patient is here today for a Mantoux (TST) test placement.    Is there a current order in the chart? Yes    Reason for Mantoux (TST) in patient's own words: School    Patient needs form signed? No - form not needed per patient.    Instructed patient to wait for 15 minutes post injection and to report any reactions immediately to staff.    Told patient to return to clinic in 48-72 hours to have Mantoux (TST) read.

## 2022-04-13 NOTE — LETTER
[facility]  [address] [place]  [address]  [phone]    April 18, 2022      Germania Hayden  [address] [address]              Dear MsHossein Hayden,      The results of your recent lab tests were within normal limits. Enclosed is a copy of these results.  If you have any further questions or problems, please contact our office.      Recent Results (from the past 240 hour(s))   Basic metabolic panel  (Ca, Cl, CO2, Creat, Gluc, K, Na, BUN)    Collection Time: 04/13/22  1:55 PM   Result Value Ref Range    Sodium 137 133 - 144 mmol/L    Potassium 3.9 3.4 - 5.3 mmol/L    Chloride 103 94 - 109 mmol/L    Carbon Dioxide (CO2) 29 20 - 32 mmol/L    Anion Gap 5 3 - 14 mmol/L    Urea Nitrogen 22 7 - 30 mg/dL    Creatinine 0.71 0.52 - 1.04 mg/dL    Calcium 9.3 8.5 - 10.1 mg/dL    Glucose 90 70 - 99 mg/dL    GFR Estimate >90 >60 mL/min/1.73m2   TB INTRADERMAL TEST    Collection Time: 04/15/22  3:07 PM   Result Value Ref Range    PPD Induration 0 0 - 4.99 mm    PPD Redness Not Present    All of your lab results are normal.    Sincerely,      Alexandra Hart, LONNY/lrevonne

## 2022-04-15 ENCOUNTER — ALLIED HEALTH/NURSE VISIT (OUTPATIENT)
Dept: FAMILY MEDICINE | Facility: CLINIC | Age: 42
End: 2022-04-15
Payer: COMMERCIAL

## 2022-04-15 DIAGNOSIS — Z11.1 SCREENING EXAMINATION FOR PULMONARY TUBERCULOSIS: Primary | ICD-10-CM

## 2022-04-15 LAB
PPDINDURATION: 0 MM (ref 0–4.99)
PPDREDNESS: NORMAL

## 2022-04-15 PROCEDURE — 99207 PR NO CHARGE NURSE ONLY: CPT

## 2022-04-15 NOTE — PROGRESS NOTES
Patient is here today for a Mantoux (TST) test results.    Did patient return to clinic 48-72 hours from Mantoux (TST) placement:   Yes -     PPD Induration   Date Value Ref Range Status   04/15/2022 0 0 - 4.99 mm Final     PPD Redness   Date Value Ref Range Status   04/15/2022 Not Present  Final     Induration Size? 0    Patient needs form signed? No-printout of result provided per patient request.     Patient reports having previously had the BCG Vaccine: No    Does patient need a two step? No     Rachelle Jesus RN  [facility]

## 2022-05-09 NOTE — PROGRESS NOTES
Germania Hayden is a 38 year old year old female patient here today for skin check. She notes a spot on side of neck that has been getting bigger. Patient has no other skin complaints today.  Remainder of the HPI, Meds, PMH, Allergies, FH, and SH was reviewed in chart.    Pertinent Hx:   No personal history of skin cancer.   Past Medical History:   Diagnosis Date     ASCUS favor benign 12/2011    neg HPV, repeat Pap + HPV cotesting in 3 years     Chickenpox      Depression      Eating disorder     bulemia- age 18 to 32     Uterovaginal prolapse 10/27/2017       Past Surgical History:   Procedure Laterality Date     ARTHROSCOPIC RECONSTRUCTION ANTERIOR CRUCIATE LIGAMENT  x3    twice on L knee and once on Rt knee     C BREAST AUGMENTATION       wisdom teeth          Family History   Problem Relation Age of Onset     Cancer Paternal Grandmother 60        ovarian     Breast Cancer Paternal Grandmother         stage 4     Cancer Maternal Grandmother         lymphoma     Cancer Maternal Grandfather         lung ,bone     Cardiovascular Paternal Grandfather         MI     Gastrointestinal Disease Brother         ulcerative colitis     Melanoma No family hx of        Social History     Socioeconomic History     Marital status:      Spouse name: Eris     Number of children: 2     Years of education: Not on file     Highest education level: Not on file   Social Needs     Financial resource strain: Not on file     Food insecurity - worry: Not on file     Food insecurity - inability: Not on file     Transportation needs - medical: Not on file     Transportation needs - non-medical: Not on file   Occupational History     Employer: [employer]   Tobacco Use     Smoking status: Never Smoker     Smokeless tobacco: Never Used   Substance and Sexual Activity     Alcohol use: Yes     Alcohol/week: 0.0 oz     Comment: Occassional- quit with pregnancy     Drug use: No     Sexual activity: Yes     Partners:  Left message to call the office Male     Birth control/protection: Pill   Other Topics Concern     Parent/sibling w/ CABG, MI or angioplasty before 65F 55M? No   Social History Narrative     Not on file       Outpatient Encounter Medications as of 12/20/2018   Medication Sig Dispense Refill     Multiple Vitamins-Minerals (MULTIVITAMIN WOMEN PO)        acyclovir (ZOVIRAX) 200 MG capsule Take 1 capsule (200 mg) by mouth 2 times daily (Patient not taking: Reported on 12/20/2018) 180 capsule 3     [DISCONTINUED] Famotidine (PEPCID PO) Take 20 mg by mouth       [DISCONTINUED] levonorgestrel-ethinyl estradiol (AVIANE,ALESSE,LESSINA) 0.1-20 MG-MCG per tablet Take 1 tablet by mouth daily 84 tablet 3     No facility-administered encounter medications on file as of 12/20/2018.              Review Of Systems  Skin: As above  Eyes: negative  Ears/Nose/Throat: negative  Respiratory: No shortness of breath, dyspnea on exertion, cough, or hemoptysis  Cardiovascular: negative  Gastrointestinal: negative  Genitourinary: negative  Musculoskeletal: negative  Neurologic: negative  Psychiatric: negative  Hematologic/Lymphatic/Immunologic: negative  Endocrine: negative      O:   NAD, WDWN, Alert & Oriented, Mood & Affect wnl, Vitals stable   Here today alone   /70   Pulse 98   SpO2 99%    General appearance normal   Vitals stable   Alert, oriented and in no acute distress     Stuck on papules and brown macules on trunk and ext   Red papules on trunk  Brown papules and macule with regular pigment network and borders on torso and upper extremities   0.6 cm skin colored papule on right lateral neck  0.4 thin brown papule on right anterior neck  0.5 cm skin colored papule on right upper arm    The remainder of expanded problem focused exam was unremarkable; the following areas were examined:  scalp/hair, conjunctiva/lids, face, neck, lips/teeth, chest, digits/nails, RUE, LUE.      Eyes: Conjunctivae/lids:Normal     ENT: Lips, buccal mucosa, tongue:  normal    MSK:Normal    Pulm: Breathing Normal    Neuro/Psych: Orientation:Normal; Mood/Affect:Normal  A/P:  1. R/O inflamed nevus on right upper arm and right lateral neck   TANGENTIAL BIOPSY SENT OUT:  After consent, anesthesia with LEC and prep, tangential excision performed and specimen sent out for permanent section histology.  No complications and routine wound care. Patient told to call our office in 1-2 weeks for result.      2. R/O seborrheic keratosis on right anterior neck TANGENTIAL BIOPSY SENT OUT:  After consent, anesthesia with LEC and prep, tangential excision performed and specimen sent out for permanent section histology.  No complications and routine wound care. Patient told to call our office in 1-2 weeks for result.      3. Seborrheic keratosis, lentigo, angioma, benign nevi   BENIGN LESIONS DISCUSSED WITH PATIENT:  I discussed the specifics of tumor, prognosis, and genetics of benign lesions.  I explained that treatment of these lesions would be purely cosmetic and not medically neccessary.  I discussed with patient different removal options including excision, cautery and /or laser.      Nature and genetics of benign skin lesions dicussed with patient.  Signs and Symptoms of skin cancer discussed with patient.  ABCDEs of melanoma reviewed with patient.  Patient encouraged to perform monthly skin exams.  UV precautions reviewed with patient.  Risks of non-melanoma skin cancer discussed with patient   Return to clinic pending biopsy results.

## 2022-05-14 ENCOUNTER — HEALTH MAINTENANCE LETTER (OUTPATIENT)
Age: 42
End: 2022-05-14

## 2022-05-19 NOTE — PROGRESS NOTES
[facility] Dermatology Note  Encounter Date: May 20, 2022  Office Visit     Dermatology Problem List:  1. Benign Biopsy   - Blue nevus - left shin bx 3/5/20   - DF - right flank bx 6/17/19   - Intradermal melanocytic nevus, right lateral neck bx 12/20/18  - Elongate rete ridges and increased basal keratinocyte pigment,   consistent with lentigo, right anterior neck bx 12/20/18  - Intradermal melanocytic nevus, right upper arm bx 12/20/18  - DF - right upper arm and right posterior shoulder bx 9/16/14    Family history: Sister with NMSC  ____________________________________________    Assessment & Plan:    #Comedone versus dilated pore on the glabella, similar on the left chin. Recommend trial of tretinoin   - Start tretinoin 0.025% cream to face. Instructed to apply topical acne medication once every other day and increase to nightly as tolerate.  Waiting 20-30 minutes after washing affected area(s) will decrease irritation. Method of application, side effects and expected results were discussed. The patient will apply pea size amount to the entire face, avoid areas around the eyes, corners of nose and mouth. Discussed side effects including irritation. Appropriate handout provided.     # Right lower cheek: fleshy skin colored papule with dark macule distally, possibly cyst with comedone or make up entrapped or regrowing darker hair follicle   - Recommend second opinion, Dr. Redman, schedulr recheck    # Multiple clinically benign nevi.   - No further intervention needed.     # Cherry angioma(s).   - No further intervention needed.    - Reviewed cosmetic treatment with PDL briefly. Patient will contact clinic if she'd like to schedule.      Procedures Performed:   None.    Follow-up: Within 10-12- weekswith Dr. Redman for a spot check, or earlier for new or changing lesions    Staff and Scribe:     Scribe Disclosure:   Jakub PULIDO, am serving as a scribe to document services personally  "performed by this physician, Dr. Amira Monae, based on data collection and the provider's statements to me.     Provider Disclosure:   The documentation recorded by the scribe accurately reflects the services I personally performed and the decisions made by me.    Amira Monae MD    Department of Dermatology  [facility]: Phone: [phone], Fax:[phone]  [facility]: Phone: [phone], Fax: [phone]      ____________________________________________    CC: Skin Check (No personal hx skin cancer, dad and sister NMSC. Not immunosuppressed. Areas of concern: face)    HPI:  Ms. Germania Hayden is a(n) 42 year old female who presents today as a new patient for a skin check.    Last seen on 3/5/20 with Mikhail PATRICK for a skin check. At that time, a bx was performed on the left shin.     Today, she has an area of concern on the face. Over the past 6 months, she has noticed increased \"bumps\" on the face. She has been using an OTC retinol from [facility].    Patient is otherwise feeling well, without additional skin concerns.    Labs Reviewed:  N/A    Physical Exam:  Vitals: LMP 04/11/2022 (Exact Date)   SKIN: Total skin excluding the undergarment areas was performed. The exam included the head/face, neck, both arms, chest, back, abdomen, both legs, digits and/or nails. Declines further exam  -nails are painted  - Comedone like pore on the glabella and chin   -no abnormal lesions on temple  - Right lower cheek: fleshy skin colored papule with darker corner on dermoscopy  - There are dome shaped bright red papules on the trunk and extremities.   - Multiple regular brown pigmented macules and papules are identified on the trunk and extremities.   - No other lesions of concern on areas examined.     Medications:  Current Outpatient Medications   Medication     acyclovir (ZOVIRAX) 200 MG capsule     " buPROPion (WELLBUTRIN XL) 150 MG 24 hr tablet     cephALEXin (KEFLEX) 500 MG capsule     cyclobenzaprine (FLEXERIL) 5 MG tablet     Multiple Vitamins-Minerals (MULTIVITAMIN PO)     oxyCODONE-acetaminophen (PERCOCET) 5-325 MG tablet     spironolactone (ALDACTONE) 100 MG tablet     No current facility-administered medications for this visit.      Past Medical History:   Patient Active Problem List   Diagnosis     Vulvar varicose veins     Uterovaginal prolapse     H/O cold sores     Moderate recurrent major depression (H)     Rectal prolapse     Cystocele, midline     Pelvic floor weakness in female     Muscular incoordination     Past Medical History:   Diagnosis Date     ASCUS favor benign 12/2011    neg HPV, repeat Pap + HPV cotesting in 3 years     Chickenpox      Depression      Eating disorder     bulemia- age 18 to 32     Spider veins 5-6 years ago     Uterovaginal prolapse 10/27/2017        CC Referred Self, MD  No address on file on close of this encounter.

## 2022-05-20 ENCOUNTER — OFFICE VISIT (OUTPATIENT)
Dept: DERMATOLOGY | Facility: CLINIC | Age: 42
End: 2022-05-20
Payer: COMMERCIAL

## 2022-05-20 DIAGNOSIS — L57.8 PHOTOAGING OF SKIN: ICD-10-CM

## 2022-05-20 DIAGNOSIS — D18.01 CHERRY ANGIOMA: Primary | ICD-10-CM

## 2022-05-20 DIAGNOSIS — D22.61 MULTIPLE BENIGN MELANOCYTIC NEVI OF UPPER AND LOWER EXTREMITIES AND TRUNK: ICD-10-CM

## 2022-05-20 DIAGNOSIS — D22.62 MULTIPLE BENIGN MELANOCYTIC NEVI OF UPPER AND LOWER EXTREMITIES AND TRUNK: ICD-10-CM

## 2022-05-20 DIAGNOSIS — D22.72 MULTIPLE BENIGN MELANOCYTIC NEVI OF UPPER AND LOWER EXTREMITIES AND TRUNK: ICD-10-CM

## 2022-05-20 DIAGNOSIS — D22.71 MULTIPLE BENIGN MELANOCYTIC NEVI OF UPPER AND LOWER EXTREMITIES AND TRUNK: ICD-10-CM

## 2022-05-20 DIAGNOSIS — D22.5 MULTIPLE BENIGN MELANOCYTIC NEVI OF UPPER AND LOWER EXTREMITIES AND TRUNK: ICD-10-CM

## 2022-05-20 PROCEDURE — 99203 OFFICE O/P NEW LOW 30 MIN: CPT | Performed by: DERMATOLOGY

## 2022-05-20 RX ORDER — TRETINOIN 0.25 MG/G
CREAM TOPICAL
Qty: 45 G | Refills: 0 | Status: SHIPPED | OUTPATIENT
Start: 2022-05-20 | End: 2023-10-06

## 2022-05-20 ASSESSMENT — PAIN SCALES - GENERAL: PAINLEVEL: NO PAIN (0)

## 2022-05-20 NOTE — PROGRESS NOTES
Germania Hayden's goals for this visit include:   Chief Complaint   Patient presents with     Skin Check     No personal hx skin cancer, dad and sister NMSC. Not immunosuppressed. Areas of concern: face       She requests these members of her care team be copied on today's visit information: no    PCP: Alexandra Hart    Referring Provider:  Referred Self, MD  No address on file    LMP 04/11/2022 (Exact Date)     Do you need any medication refills at today's visit? No  Leah Soria LPN

## 2022-05-20 NOTE — LETTER
5/20/2022         RE: Germania aHyden  [address]  [facility] [address]        Dear Colleague,    Thank you for referring your patient, Germania Hayden, to the [facility]. Please see a copy of my visit note below.    [facility] Dermatology Note  Encounter Date: May 20, 2022  Office Visit     Dermatology Problem List:  1. Benign Biopsy   - Blue nevus - left shin bx 3/5/20   - DF - right flank bx 6/17/19   - Intradermal melanocytic nevus, right lateral neck bx 12/20/18  - Elongate rete ridges and increased basal keratinocyte pigment,   consistent with lentigo, right anterior neck bx 12/20/18  - Intradermal melanocytic nevus, right upper arm bx 12/20/18  - DF - right upper arm and right posterior shoulder bx 9/16/14    Family history: Sister with NMSC  ____________________________________________    Assessment & Plan:    #Comedone versus dilated pore on the glabella, similar on the left chin. Recommend trial of tretinoin   - Start tretinoin 0.025% cream to face. Instructed to apply topical acne medication once every other day and increase to nightly as tolerate.  Waiting 20-30 minutes after washing affected area(s) will decrease irritation. Method of application, side effects and expected results were discussed. The patient will apply pea size amount to the entire face, avoid areas around the eyes, corners of nose and mouth. Discussed side effects including irritation. Appropriate handout provided.     # Right lower cheek: fleshy skin colored papule with dark macule distally, possibly cyst with comedone or make up entrapped or regrowing darker hair follicle   - Recommend second opinion, lorri Harrisr recheck    # Multiple clinically benign nevi.   - No further intervention needed.     # Cherry angioma(s).   - No further intervention needed.    - Reviewed cosmetic treatment with PDL briefly. Patient will contact clinic if she'd like to  "schedule.      Procedures Performed:   None.    Follow-up: Within 10-12- weekswith Dr. Redman for a spot check, or earlier for new or changing lesions    Staff and Scribe:     Scribe Disclosure:   I, Jakub Porter, am serving as a scribe to document services personally performed by this physician, Dr. Amira Monae, based on data collection and the provider's statements to me.     Provider Disclosure:   The documentation recorded by the scribe accurately reflects the services I personally performed and the decisions made by me.    Amira Monae MD    Department of Dermatology  [facility]: Phone: [phone], Fax:[phone]  [facility]: Phone: [phone], Fax: [phone]      ____________________________________________    CC: Skin Check (No personal hx skin cancer, dad and sister NMSC. Not immunosuppressed. Areas of concern: face)    HPI:  Ms. Germania Hayden is a(n) 42 year old female who presents today as a new patient for a skin check.    Last seen on 3/5/20 with Mikhail PATRICK for a skin check. At that time, a bx was performed on the left shin.     Today, she has an area of concern on the face. Over the past 6 months, she has noticed increased \"bumps\" on the face. She has been using an OTC retinol from [facility].    Patient is otherwise feeling well, without additional skin concerns.    Labs Reviewed:  N/A    Physical Exam:  Vitals: LMP 04/11/2022 (Exact Date)   SKIN: Total skin excluding the undergarment areas was performed. The exam included the head/face, neck, both arms, chest, back, abdomen, both legs, digits and/or nails. Declines further exam  -nails are painted  - Comedone like pore on the glabella and chin   -no abnormal lesions on temple  - Right lower cheek: fleshy skin colored papule with darker corner on dermoscopy  - There are dome shaped bright red papules on the trunk and " extremities.   - Multiple regular brown pigmented macules and papules are identified on the trunk and extremities.   - No other lesions of concern on areas examined.     Medications:  Current Outpatient Medications   Medication     acyclovir (ZOVIRAX) 200 MG capsule     buPROPion (WELLBUTRIN XL) 150 MG 24 hr tablet     cephALEXin (KEFLEX) 500 MG capsule     cyclobenzaprine (FLEXERIL) 5 MG tablet     Multiple Vitamins-Minerals (MULTIVITAMIN PO)     oxyCODONE-acetaminophen (PERCOCET) 5-325 MG tablet     spironolactone (ALDACTONE) 100 MG tablet     No current facility-administered medications for this visit.      Past Medical History:   Patient Active Problem List   Diagnosis     Vulvar varicose veins     Uterovaginal prolapse     H/O cold sores     Moderate recurrent major depression (H)     Rectal prolapse     Cystocele, midline     Pelvic floor weakness in female     Muscular incoordination     Past Medical History:   Diagnosis Date     ASCUS favor benign 12/2011    neg HPV, repeat Pap + HPV cotesting in 3 years     Chickenpox      Depression      Eating disorder     bulemia- age 18 to 32     Spider veins 5-6 years ago     Uterovaginal prolapse 10/27/2017        CC Referred Self, MD  No address on file on close of this encounter.    Germania Hayden's goals for this visit include:   Chief Complaint   Patient presents with     Skin Check     No personal hx skin cancer, dad and sister NMSC. Not immunosuppressed. Areas of concern: face       She requests these members of her care team be copied on today's visit information: no    PCP: Alexandra Hart    Referring Provider:  Referred Self, MD  No address on file    LMP 04/11/2022 (Exact Date)     Do you need any medication refills at today's visit? No  Leah Soria LPN          Again, thank you for allowing me to participate in the care of your patient.        Sincerely,        Amira Monae MD

## 2022-05-23 ENCOUNTER — TELEPHONE (OUTPATIENT)
Dept: DERMATOLOGY | Facility: CLINIC | Age: 42
End: 2022-05-23
Payer: COMMERCIAL

## 2022-05-23 NOTE — TELEPHONE ENCOUNTER
5/23 2nd attempt. Provided phone number [phone] to schedule Spot check with melody Brannon in 10-12 weeks around august 1st.     Candace peck Procedure   Orthopedics, Podiatry, Sports Medicine, ENT/Eye Specialties  [facility]   [phone]

## 2022-05-29 ENCOUNTER — HOSPITAL ENCOUNTER (EMERGENCY)
Facility: CLINIC | Age: 42
Discharge: HOME | End: 2022-05-30
Attending: EMERGENCY MEDICINE | Admitting: EMERGENCY MEDICINE
Payer: COMMERCIAL

## 2022-05-29 DIAGNOSIS — R07.9 CHEST PAIN, UNSPECIFIED TYPE: ICD-10-CM

## 2022-05-29 PROCEDURE — 99284 EMERGENCY DEPT VISIT MOD MDM: CPT | Mod: 25 | Performed by: EMERGENCY MEDICINE

## 2022-05-29 PROCEDURE — 93010 ELECTROCARDIOGRAM REPORT: CPT | Performed by: EMERGENCY MEDICINE

## 2022-05-29 PROCEDURE — 93005 ELECTROCARDIOGRAM TRACING: CPT | Performed by: EMERGENCY MEDICINE

## 2022-05-30 VITALS
HEART RATE: 85 BPM | OXYGEN SATURATION: 98 % | DIASTOLIC BLOOD PRESSURE: 76 MMHG | WEIGHT: 125 LBS | RESPIRATION RATE: 11 BRPM | SYSTOLIC BLOOD PRESSURE: 101 MMHG | HEIGHT: 65 IN | BODY MASS INDEX: 20.83 KG/M2

## 2022-05-30 LAB
ANION GAP SERPL CALCULATED.3IONS-SCNC: 7 MMOL/L (ref 3–14)
BASOPHILS # BLD AUTO: 0 10E3/UL (ref 0–0.2)
BASOPHILS NFR BLD AUTO: 0 %
BUN SERPL-MCNC: 20 MG/DL (ref 7–30)
CALCIUM SERPL-MCNC: 9.1 MG/DL (ref 8.5–10.1)
CHLORIDE BLD-SCNC: 108 MMOL/L (ref 94–109)
CO2 SERPL-SCNC: 27 MMOL/L (ref 20–32)
CREAT SERPL-MCNC: 0.73 MG/DL (ref 0.52–1.04)
EOSINOPHIL # BLD AUTO: 0.2 10E3/UL (ref 0–0.7)
EOSINOPHIL NFR BLD AUTO: 2 %
ERYTHROCYTE [DISTWIDTH] IN BLOOD BY AUTOMATED COUNT: 13.1 % (ref 10–15)
GFR SERPL CREATININE-BSD FRML MDRD: >90 ML/MIN/1.73M2
GLUCOSE BLD-MCNC: 90 MG/DL (ref 70–99)
HCT VFR BLD AUTO: 40.8 % (ref 35–47)
HGB BLD-MCNC: 13.2 G/DL (ref 11.7–15.7)
IMM GRANULOCYTES # BLD: 0 10E3/UL
IMM GRANULOCYTES NFR BLD: 0 %
LYMPHOCYTES # BLD AUTO: 2.6 10E3/UL (ref 0.8–5.3)
LYMPHOCYTES NFR BLD AUTO: 29 %
MCH RBC QN AUTO: 30.3 PG (ref 26.5–33)
MCHC RBC AUTO-ENTMCNC: 32.4 G/DL (ref 31.5–36.5)
MCV RBC AUTO: 94 FL (ref 78–100)
MONOCYTES # BLD AUTO: 0.8 10E3/UL (ref 0–1.3)
MONOCYTES NFR BLD AUTO: 8 %
NEUTROPHILS # BLD AUTO: 5.5 10E3/UL (ref 1.6–8.3)
NEUTROPHILS NFR BLD AUTO: 61 %
NRBC # BLD AUTO: 0 10E3/UL
NRBC BLD AUTO-RTO: 0 /100
PLATELET # BLD AUTO: 263 10E3/UL (ref 150–450)
POTASSIUM BLD-SCNC: 3.8 MMOL/L (ref 3.4–5.3)
RBC # BLD AUTO: 4.36 10E6/UL (ref 3.8–5.2)
SODIUM SERPL-SCNC: 142 MMOL/L (ref 133–144)
TROPONIN I SERPL HS-MCNC: 3 NG/L
WBC # BLD AUTO: 9.1 10E3/UL (ref 4–11)

## 2022-05-30 PROCEDURE — 36415 COLL VENOUS BLD VENIPUNCTURE: CPT | Performed by: EMERGENCY MEDICINE

## 2022-05-30 PROCEDURE — 80048 BASIC METABOLIC PNL TOTAL CA: CPT | Performed by: EMERGENCY MEDICINE

## 2022-05-30 PROCEDURE — 85004 AUTOMATED DIFF WBC COUNT: CPT | Performed by: EMERGENCY MEDICINE

## 2022-05-30 PROCEDURE — 84484 ASSAY OF TROPONIN QUANT: CPT | Performed by: EMERGENCY MEDICINE

## 2022-05-30 ASSESSMENT — ENCOUNTER SYMPTOMS
SHORTNESS OF BREATH: 0
ABDOMINAL PAIN: 0
FEVER: 0

## 2022-05-30 NOTE — ED TRIAGE NOTES
Patient reports chest discomfort for 3 days.     Triage Assessment     Row Name 05/30/22 0002       Triage Assessment (Adult)    Airway WDL WDL       Respiratory WDL    Respiratory WDL WDL       Skin Circulation/Temperature WDL    Skin Circulation/Temperature WDL WDL       Cardiac WDL    Cardiac WDL WDL       Peripheral/Neurovascular WDL    Peripheral Neurovascular WDL WDL       Cognitive/Neuro/Behavioral WDL    Cognitive/Neuro/Behavioral WDL WDL

## 2022-05-30 NOTE — ED PROVIDER NOTES
History     Chief Complaint   Patient presents with     Chest Pain     HPI  Germania Hayden is a 42 year old female who has past medical history significant for anxiety, presenting to the emergency department with concerns regarding chest pain.  Symptoms present over the past 3 days.  She noticed increase amounts of almost spasm-like pains in the lower chest, feeling as if she is having muscle spasms, or perhaps skipped beats.  No severe pressure-like sensation.  No radiation elsewhere.  No lightheadedness, or dizziness.  No fever, chills, or shortness of breath.  No abdominal discomfort.  No history of similar types of pain previously.  No recent medication changes    Allergies:  No Known Allergies    Problem List:    Patient Active Problem List    Diagnosis Date Noted     Pelvic floor weakness in female 01/06/2022     Priority: Medium     Muscular incoordination 01/06/2022     Priority: Medium     Rectal prolapse 11/22/2021     Priority: Medium     Cystocele, midline 11/22/2021     Priority: Medium     Moderate recurrent major depression (H) 12/19/2019     Priority: Medium     H/O cold sores 04/19/2018     Priority: Medium     Uterovaginal prolapse 10/27/2017     Priority: Medium     Trial of size 4 ring with support pessary 3/2018 due to 2-3+ cystocele, 2+ cervical decensus, 1+ rectocele with strain  Consider future TOTAL VAGINAL HYSTERECTOMY, A & P, SACROSPINOUS VAULT SUSPENSION  Refit with size 6 ruing with support pessary ;3/29/2019         Vulvar varicose veins 04/21/2014     Priority: Medium        Past Medical History:    Past Medical History:   Diagnosis Date     ASCUS favor benign 12/2011     Chickenpox      Depression      Eating disorder      Spider veins 5-6 years ago     Uterovaginal prolapse 10/27/2017       Past Surgical History:    Past Surgical History:   Procedure Laterality Date     ARTHROSCOPIC RECONSTRUCTION ANTERIOR CRUCIATE LIGAMENT  x3    twice on L knee and once on Rt knee      "ESOPHAGOSCOPY, GASTROSCOPY, DUODENOSCOPY (EGD), COMBINED N/A 10/10/2019    Procedure: ESOPHAGOGASTRODUODENOSCOPY, WITH BIOPSY;  Surgeon: Shirlene Wilson MD;  Location: [facility] OR     MAMMOPLASTY AUGMENTATION Bilateral 2015     wisdom teeth       ZZC BREAST AUGMENTATION         Family History:    Family History   Problem Relation Age of Onset     Cancer Paternal Grandmother 60        ovarian     Breast Cancer Paternal Grandmother 85.00     Cancer Maternal Grandmother         lymphoma     Cancer Maternal Grandfather         lung ,bone     Cardiovascular Paternal Grandfather         MI     Mental Illness Brother         schizoaffective disorder     Depression Mother      Colon Polyps Mother      Gastrointestinal Disease Brother         ulcerative colitis     Melanoma No family hx of        Social History:  Marital Status:   [2]  Social History     Tobacco Use     Smoking status: Never Smoker     Smokeless tobacco: Never Used   Vaping Use     Vaping Use: Never used   Substance Use Topics     Alcohol use: Yes     Alcohol/week: 0.0 standard drinks     Comment: rare     Drug use: No        Medications:    acyclovir (ZOVIRAX) 200 MG capsule  buPROPion (WELLBUTRIN XL) 150 MG 24 hr tablet  cephALEXin (KEFLEX) 500 MG capsule  cyclobenzaprine (FLEXERIL) 5 MG tablet  Multiple Vitamins-Minerals (MULTIVITAMIN PO)  oxyCODONE-acetaminophen (PERCOCET) 5-325 MG tablet  spironolactone (ALDACTONE) 100 MG tablet  tretinoin (RETIN-A) 0.025 % external cream          Review of Systems   Constitutional: Negative for fever.   Respiratory: Negative for shortness of breath.    Cardiovascular: Positive for chest pain.   Gastrointestinal: Negative for abdominal pain.   All other systems reviewed and are negative.      Physical Exam   BP: 111/84  Pulse: 95  Resp: 14  Height: 165.1 cm (5' 5\")  Weight: 56.7 kg (125 lb)  SpO2: 99 %      Physical Exam  /76   Pulse 85   Resp 11   Ht 1.651 m (5' 5\")   Wt 56.7 kg (125 lb)   LMP " 04/11/2022 (Exact Date)   SpO2 98%   BMI 20.80 kg/m    General: alert, interactive, in no apparent distress  Head: atraumatic  Nose: no rhinorrhea or epistaxis  Ears: no external auditory canal discharge or bleeding.    Eyes: Sclera nonicteric. Conjunctiva noninjected.    Mouth: no tonsillar erythema, edema, or exudate  Neck: supple, no palp LAD  Lungs: CTAB  CV: RRR, S1/S2; peripheral pulses palpable and symmetric  Abdomen: soft, nt, nd, no guarding or rebound. Positive bowel sounds  Extremities: no cyanosis or edema  Skin: no rash or diaphoresis  Neuro:  strength 5/5 in UE and LEs bilaterally, sensation intact to light touch in UE and LEs bilaterally;       ED Course                 Procedures         EKG, reviewed by myself shows sinus tachycardia.  Rate 104 bpm.  Somewhat wandering baseline.  No acute ischemic appearing changes.  Nonspecific XA-L-gatphhx changes.    Critical Care time:  none               Results for orders placed or performed during the hospital encounter of 05/29/22 (from the past 24 hour(s))   CBC with platelets differential    Narrative    The following orders were created for panel order CBC with platelets differential.  Procedure                               Abnormality         Status                     ---------                               -----------         ------                     CBC with platelets and d...[[id number]]                      Final result                 Please view results for these tests on the individual orders.   Basic metabolic panel   Result Value Ref Range    Sodium 142 133 - 144 mmol/L    Potassium 3.8 3.4 - 5.3 mmol/L    Chloride 108 94 - 109 mmol/L    Carbon Dioxide (CO2) 27 20 - 32 mmol/L    Anion Gap 7 3 - 14 mmol/L    Urea Nitrogen 20 7 - 30 mg/dL    Creatinine 0.73 0.52 - 1.04 mg/dL    Calcium 9.1 8.5 - 10.1 mg/dL    Glucose 90 70 - 99 mg/dL    GFR Estimate >90 >60 mL/min/1.73m2   Troponin I   Result Value Ref Range    Troponin I High Sensitivity 3  <54 ng/L   CBC with platelets and differential   Result Value Ref Range    WBC Count 9.1 4.0 - 11.0 10e3/uL    RBC Count 4.36 3.80 - 5.20 10e6/uL    Hemoglobin 13.2 11.7 - 15.7 g/dL    Hematocrit 40.8 35.0 - 47.0 %    MCV 94 78 - 100 fL    MCH 30.3 26.5 - 33.0 pg    MCHC 32.4 31.5 - 36.5 g/dL    RDW 13.1 10.0 - 15.0 %    Platelet Count 263 150 - 450 10e3/uL    % Neutrophils 61 %    % Lymphocytes 29 %    % Monocytes 8 %    % Eosinophils 2 %    % Basophils 0 %    % Immature Granulocytes 0 %    NRBCs per 100 WBC 0 <1 /100    Absolute Neutrophils 5.5 1.6 - 8.3 10e3/uL    Absolute Lymphocytes 2.6 0.8 - 5.3 10e3/uL    Absolute Monocytes 0.8 0.0 - 1.3 10e3/uL    Absolute Eosinophils 0.2 0.0 - 0.7 10e3/uL    Absolute Basophils 0.0 0.0 - 0.2 10e3/uL    Absolute Immature Granulocytes 0.0 <=0.4 10e3/uL    Absolute NRBCs 0.0 10e3/uL       Medications - No data to display    Assessments & Plan (with Medical Decision Making)  42 year old female presenting to the emergency department with concerns regarding chest pain, with spasm-like sensation, and perhaps fluttering beats.  Patient placed on the monitor.  EKG unremarkable.  3-day history of symptoms, and troponin is negative.  Therefore acute myocardial infarction has been ruled out, I feel that this is less likely ACS.  Patient does have significant anxiety, and anxiety may be contributing as well.  Patient reassured, and encouraged to follow-up in clinic.  If ongoing symptoms would recommend Holter monitor as initial next test.  I discussed this with patient, and she voiced her understanding's.     I have reviewed the nursing notes.    I have reviewed the findings, diagnosis, plan and need for follow up with the patient.       Discharge Medication List as of 5/30/2022 12:30 AM          Final diagnoses:   Chest pain, unspecified type       5/29/2022   [facility] EMERGENCY DEPT     Devante Donahue MD  05/30/22 0121

## 2022-05-30 NOTE — DISCHARGE INSTRUCTIONS
Blood tests were normal.  Follow-up in clinic as needed.  Be seen if new or worsening symptoms develop.

## 2022-05-31 ENCOUNTER — PATIENT OUTREACH (OUTPATIENT)
Dept: FAMILY MEDICINE | Facility: CLINIC | Age: 42
End: 2022-05-31
Payer: COMMERCIAL

## 2022-06-01 NOTE — TELEPHONE ENCOUNTER
ED / Discharge Outreach Protocol    Patient Contact    Attempt # 1    Was call answered?  No.  Left message on voicemail with information to call me back. Kamini MADRIGAL RN

## 2022-06-02 NOTE — TELEPHONE ENCOUNTER
"  ED for acute condition Discharge Protocol    \"Hi, my name is Gloria Romero RN, a registered nurse, and I am calling from [facility].  I am calling to follow up and see how things are going for you after your recent emergency visit.\"    Tell me how you are doing now that you are home?\" Pt states that she is feeling much better!  No chest pain yesterday or today.  Will continue to monitor and return if further problems or symptoms.        Discharge Instructions    \"Let's review your discharge instructions.  What is/are the follow-up recommendations?  Pt. Response: Reviewed in detail with pt    \"Has an appointment with your primary care provider been scheduled?\"  No (not needed)    Medications    \"Tell me what changed about your medicines when you discharged?\"    No changes    \"What questions do you have about your medications?\"   None        Call Summary    \"What questions or concerns do you have about your recent visit and your follow-up care?\"     none    \"If you have questions or things don't continue to improve, we encourage you contact us through the main clinic number (give number).  Even if the clinic is not open, triage nurses are available 24/7 to help you.     We would like you to know that our clinic has extended hours (provide information).  We also have urgent care (provide details on closest location and hours/contact info)\"    \"Thank you for your time and take care!\"                "

## 2022-07-08 PROBLEM — K62.3 RECTAL PROLAPSE: Status: RESOLVED | Noted: 2021-11-22 | Resolved: 2022-07-08

## 2022-07-08 PROBLEM — N81.11 CYSTOCELE, MIDLINE: Status: RESOLVED | Noted: 2021-11-22 | Resolved: 2022-07-08

## 2022-07-08 PROBLEM — N81.89 PELVIC FLOOR WEAKNESS IN FEMALE: Status: RESOLVED | Noted: 2022-01-06 | Resolved: 2022-07-08

## 2022-07-08 PROBLEM — R27.8 MUSCULAR INCOORDINATION: Status: RESOLVED | Noted: 2022-01-06 | Resolved: 2022-07-08

## 2022-07-08 NOTE — PROGRESS NOTES
Patient did not return for further treatment and no additional progress was noted.  Please refer to the progress note and goal flowsheet completed on 2/28/22  for discharge information.

## 2022-07-27 ENCOUNTER — MYC MEDICAL ADVICE (OUTPATIENT)
Dept: FAMILY MEDICINE | Facility: CLINIC | Age: 42
End: 2022-07-27

## 2022-07-28 ENCOUNTER — VIRTUAL VISIT (OUTPATIENT)
Dept: FAMILY MEDICINE | Facility: CLINIC | Age: 42
End: 2022-07-28
Payer: COMMERCIAL

## 2022-07-28 DIAGNOSIS — T78.40XA ALLERGIC REACTION, INITIAL ENCOUNTER: Primary | ICD-10-CM

## 2022-07-28 PROCEDURE — 99213 OFFICE O/P EST LOW 20 MIN: CPT | Mod: 95 | Performed by: FAMILY MEDICINE

## 2022-07-28 RX ORDER — PREDNISONE 10 MG/1
30 TABLET ORAL DAILY
Qty: 9 TABLET | Refills: 0 | Status: SHIPPED | OUTPATIENT
Start: 2022-07-28 | End: 2022-07-31

## 2022-07-28 ASSESSMENT — PATIENT HEALTH QUESTIONNAIRE - PHQ9
10. IF YOU CHECKED OFF ANY PROBLEMS, HOW DIFFICULT HAVE THESE PROBLEMS MADE IT FOR YOU TO DO YOUR WORK, TAKE CARE OF THINGS AT HOME, OR GET ALONG WITH OTHER PEOPLE: NOT DIFFICULT AT ALL
SUM OF ALL RESPONSES TO PHQ QUESTIONS 1-9: 4
SUM OF ALL RESPONSES TO PHQ QUESTIONS 1-9: 4

## 2022-07-28 ASSESSMENT — ENCOUNTER SYMPTOMS: ALLERGIC REACTION: 1

## 2022-07-28 NOTE — PROGRESS NOTES
Germania is a 42 year old who is being evaluated via a billable video visit.      How would you like to obtain your AVS? MyChart  If the video visit is dropped, the invitation should be resent by: Text to cell phone: [phone]  Will anyone else be joining your video visit? No      Subjective   Germania is a 42 year old accompanied by her self, presenting for the following health issues:  Allergic Reaction (Swelling eyes, lips, started yesterday)      Allergic Reaction    History of Present Illness       Reason for visit:  Allergica Reaction.  Symptom onset:  1-3 days ago  Symptoms include:  Red, swollen, itchy face, eyelids, lips  Symptom intensity:  Moderate  Symptom progression:  Worsening  Had these symptoms before:  No  What makes it worse:  ?  What makes it better:  I've tried benadryl-seems to improve a littte    She eats 2-3 servings of fruits and vegetables daily.She consumes 0 sweetened beverage(s) daily.She exercises with enough effort to increase her heart rate 20 to 29 minutes per day.    She is taking medications regularly.    Today's PHQ-9         PHQ-9 Total Score: 4    PHQ-9 Q9 Thoughts of better off dead/self-harm past 2 weeks :   Not at all    How difficult have these problems made it for you to do your work, take care of things at home, or get along with other people: Not difficult at all  Reviewed above history.  Problem began yesterday morning, peaked overnight last night.  Swelling burning itchy eyelids, red splotchy face, lips feel a little irritated and may be slightly swollen, no tongue swelling, no shortness of breath.  This is happened before, this is a more severe problem than she has had in the past.  Has tried Benadryl, helps a little bit.    On exam, can see that eyelids are red and slightly swollen.  Has a picture of her cell from last night which is worse.    1. Allergic reaction, initial encounter  Local/topical, across face, cause unclear.  No sign of anaphylaxis or systemic  reaction  Agree with diphenhydramine, recommend nonsedating antihistamine during the day.  Offered steroid, she accepted.  We will keep course limited, 30 mg daily for 3 days.  Also suggested/offered allergist referral.  She indicates that she will consider that.  - predniSONE (DELTASONE) 10 MG tablet; Take 3 tablets (30 mg) by mouth daily for 3 days  Dispense: 9 tablet; Refill: 0      Video-Visit Details    Video Start Time: 1030    Type of service:  Video Visit    Video End Time:10:43 AM    Originating Location (pt. Location): Home    Distant Location (provider location):  [facility]     Platform used for Video Visit: [employer]indy[employer]    .  ..

## 2022-07-31 ENCOUNTER — OFFICE VISIT (OUTPATIENT)
Dept: URGENT CARE | Facility: URGENT CARE | Age: 42
End: 2022-07-31
Payer: COMMERCIAL

## 2022-07-31 VITALS
WEIGHT: 123 LBS | DIASTOLIC BLOOD PRESSURE: 73 MMHG | SYSTOLIC BLOOD PRESSURE: 103 MMHG | OXYGEN SATURATION: 100 % | BODY MASS INDEX: 20.47 KG/M2 | RESPIRATION RATE: 18 BRPM | HEART RATE: 96 BPM | TEMPERATURE: 97.8 F

## 2022-07-31 DIAGNOSIS — L30.9 DERMATITIS: Primary | ICD-10-CM

## 2022-07-31 PROCEDURE — 99213 OFFICE O/P EST LOW 20 MIN: CPT | Performed by: EMERGENCY MEDICINE

## 2022-07-31 RX ORDER — METHYLPREDNISOLONE 4 MG
TABLET, DOSE PACK ORAL
Qty: 21 TABLET | Refills: 0 | Status: SHIPPED | OUTPATIENT
Start: 2022-07-31 | End: 2022-11-08

## 2022-07-31 NOTE — PATIENT INSTRUCTIONS
Apply 1% hydrocortisone ointment twice daily to affected areas for 1 week only  Start Medrol Dosepak tomorrow  No excessive other chemicals on your face  Recheck if worse and be seen in follow-up in 1 week if no improvement

## 2022-07-31 NOTE — PROGRESS NOTES
CHIEF COMPLAINT: Periorbital swelling and redness      HPI: Patient is a 42-year-old female who has now noticed redness and itching around both eyes and some scaling and redness on her chin.  No new make-up.  She wonders whether she may have had contact with something in the garden.  No other body wide allergic symptoms.  No new facial detergents and that she only uses Clearasil facial wash and has done so for years.  She is currently finishing a 30 mg/day course of oral steroids.      ROS: See HPI otherwise normal.    No Known Allergies   Current Outpatient Medications   Medication Sig Dispense Refill     acyclovir (ZOVIRAX) 200 MG capsule TAKE 1 CAPSULE BY MOUTH TWICE DAILY . 180 capsule 3     buPROPion (WELLBUTRIN XL) 150 MG 24 hr tablet TAKE 1 TABLET BY MOUTH IN THE MORNING 90 tablet 3     methylPREDNISolone (MEDROL DOSEPAK) 4 MG tablet therapy pack Follow Package Directions 21 tablet 0     Multiple Vitamins-Minerals (MULTIVITAMIN PO)        predniSONE (DELTASONE) 10 MG tablet Take 3 tablets (30 mg) by mouth daily for 3 days 9 tablet 0     tretinoin (RETIN-A) 0.025 % external cream Use a pea sized amount every other night to nightly as tolerated 45 g 0         PE: Physical exam reveals a 42-year-old female to be no acute distress.  Examination of her face reveals localized erythema in the periorbital region which is somewhat scaly.  Her chin show some scaly skin as well.  No cellulitic changes.  No vesicles.        TREATMENT: None.      ASSESSMENT: Pattern of symptoms appears to be contact dermatitis in nature but etiology is unclear.      DIAGNOSIS: Contact dermatitis.      PLAN: Add low-dose cortisone ointment to the areas for no more than 1 week and add Medrol Dosepak with dermatology follow-up or PCP follow-up 1 week if no improvement, sooner if worse.

## 2022-09-03 ENCOUNTER — HEALTH MAINTENANCE LETTER (OUTPATIENT)
Age: 42
End: 2022-09-03

## 2022-09-19 ENCOUNTER — MYC MEDICAL ADVICE (OUTPATIENT)
Dept: FAMILY MEDICINE | Facility: CLINIC | Age: 42
End: 2022-09-19

## 2022-09-19 DIAGNOSIS — L68.0 HIRSUTISM: ICD-10-CM

## 2022-09-20 RX ORDER — SPIRONOLACTONE 50 MG/1
50 TABLET, FILM COATED ORAL 2 TIMES DAILY
Qty: 180 TABLET | Refills: 1 | Status: SHIPPED | OUTPATIENT
Start: 2022-09-20 | End: 2022-11-08

## 2022-11-07 ASSESSMENT — ANXIETY QUESTIONNAIRES
3. WORRYING TOO MUCH ABOUT DIFFERENT THINGS: MORE THAN HALF THE DAYS
8. IF YOU CHECKED OFF ANY PROBLEMS, HOW DIFFICULT HAVE THESE MADE IT FOR YOU TO DO YOUR WORK, TAKE CARE OF THINGS AT HOME, OR GET ALONG WITH OTHER PEOPLE?: SOMEWHAT DIFFICULT
7. FEELING AFRAID AS IF SOMETHING AWFUL MIGHT HAPPEN: SEVERAL DAYS
GAD7 TOTAL SCORE: 13
GAD7 TOTAL SCORE: 13
4. TROUBLE RELAXING: MORE THAN HALF THE DAYS
GAD7 TOTAL SCORE: 13
IF YOU CHECKED OFF ANY PROBLEMS ON THIS QUESTIONNAIRE, HOW DIFFICULT HAVE THESE PROBLEMS MADE IT FOR YOU TO DO YOUR WORK, TAKE CARE OF THINGS AT HOME, OR GET ALONG WITH OTHER PEOPLE: SOMEWHAT DIFFICULT
2. NOT BEING ABLE TO STOP OR CONTROL WORRYING: MORE THAN HALF THE DAYS
6. BECOMING EASILY ANNOYED OR IRRITABLE: MORE THAN HALF THE DAYS
7. FEELING AFRAID AS IF SOMETHING AWFUL MIGHT HAPPEN: SEVERAL DAYS
5. BEING SO RESTLESS THAT IT IS HARD TO SIT STILL: MORE THAN HALF THE DAYS
1. FEELING NERVOUS, ANXIOUS, OR ON EDGE: MORE THAN HALF THE DAYS

## 2022-11-07 ASSESSMENT — PATIENT HEALTH QUESTIONNAIRE - PHQ9
10. IF YOU CHECKED OFF ANY PROBLEMS, HOW DIFFICULT HAVE THESE PROBLEMS MADE IT FOR YOU TO DO YOUR WORK, TAKE CARE OF THINGS AT HOME, OR GET ALONG WITH OTHER PEOPLE: SOMEWHAT DIFFICULT
SUM OF ALL RESPONSES TO PHQ QUESTIONS 1-9: 13
SUM OF ALL RESPONSES TO PHQ QUESTIONS 1-9: 13

## 2022-11-08 ENCOUNTER — VIRTUAL VISIT (OUTPATIENT)
Dept: FAMILY MEDICINE | Facility: CLINIC | Age: 42
End: 2022-11-08
Payer: COMMERCIAL

## 2022-11-08 DIAGNOSIS — F33.1 MODERATE RECURRENT MAJOR DEPRESSION (H): Primary | ICD-10-CM

## 2022-11-08 DIAGNOSIS — F43.23 SITUATIONAL MIXED ANXIETY AND DEPRESSIVE DISORDER: ICD-10-CM

## 2022-11-08 DIAGNOSIS — H90.3 BILATERAL SENSORINEURAL HEARING LOSS: ICD-10-CM

## 2022-11-08 DIAGNOSIS — L68.0 HIRSUTISM: ICD-10-CM

## 2022-11-08 PROCEDURE — 96127 BRIEF EMOTIONAL/BEHAV ASSMT: CPT | Mod: 95 | Performed by: NURSE PRACTITIONER

## 2022-11-08 PROCEDURE — 99214 OFFICE O/P EST MOD 30 MIN: CPT | Mod: 95 | Performed by: NURSE PRACTITIONER

## 2022-11-08 RX ORDER — SPIRONOLACTONE 100 MG/1
100 TABLET, FILM COATED ORAL 2 TIMES DAILY
Qty: 60 TABLET | Refills: 3 | Status: SHIPPED | OUTPATIENT
Start: 2022-11-08 | End: 2022-12-06

## 2022-11-08 RX ORDER — HYDROXYZINE HYDROCHLORIDE 10 MG/1
10-20 TABLET, FILM COATED ORAL
Qty: 60 TABLET | Refills: 1 | Status: SHIPPED | OUTPATIENT
Start: 2022-11-08 | End: 2023-05-30

## 2022-11-08 RX ORDER — BUPROPION HYDROCHLORIDE 150 MG/1
300 TABLET ORAL EVERY MORNING
Qty: 180 TABLET | Refills: 1 | Status: SHIPPED | OUTPATIENT
Start: 2022-11-08 | End: 2023-06-08

## 2022-11-08 ASSESSMENT — ANXIETY QUESTIONNAIRES: GAD7 TOTAL SCORE: 13

## 2022-11-08 ASSESSMENT — PATIENT HEALTH QUESTIONNAIRE - PHQ9
10. IF YOU CHECKED OFF ANY PROBLEMS, HOW DIFFICULT HAVE THESE PROBLEMS MADE IT FOR YOU TO DO YOUR WORK, TAKE CARE OF THINGS AT HOME, OR GET ALONG WITH OTHER PEOPLE: SOMEWHAT DIFFICULT
SUM OF ALL RESPONSES TO PHQ QUESTIONS 1-9: 13

## 2022-11-08 NOTE — PROGRESS NOTES
Germania is a 42 year old who is being evaluated via a billable telephone visit.      What phone number would you like to be contacted at? [phone]  How would you like to obtain your AVS? MyChart    Assessment & Plan     Moderate recurrent major depression (H)  Worsening - reviewed PHQ-9  Increased Wellbutrin.  Follow-up in 2-3 weeks.    - buPROPion (WELLBUTRIN XL) 150 MG 24 hr tablet  Dispense: 180 tablet; Refill: 1  - hydrOXYzine (ATARAX) 10 MG tablet  Dispense: 60 tablet; Refill: 1  - spironolactone (ALDACTONE) 100 MG tablet  Dispense: 60 tablet; Refill: 3  - TSH with free T4 reflex    Situational mixed anxiety and depressive disorder   More anxiety lately.  Hydroxyzine added for anxiety and for sleep.  Mychart me with updates.    - hydrOXYzine (ATARAX) 10 MG tablet  Dispense: 60 tablet; Refill: 1  - spironolactone (ALDACTONE) 100 MG tablet  Dispense: 60 tablet; Refill: 3  - TSH with free T4 reflex    Bilateral sensorineural hearing loss  New onset - requesting Audiology referral.    - Adult Audiology  Referral    Hirsutism  Increased dose of Spironolactone.     - Basic metabolic panel  (Ca, Cl, CO2, Creat, Gluc, K, Na, BUN)            See Patient Instructions    Return in about 4 weeks (around 12/6/2022) for Recheck symptoms, Anxiety/Depression Follow up.    Alexandra Hart NP  [facility]    Subjective   Germania is a 42 year old, presenting for the following health issues:  Depression and Hirtuism      History of Present Illness       Mental Health Follow-up:  Patient presents to follow-up on Depression & Anxiety.Patient's depression since last visit has been:  Worse  The patient is not having other symptoms associated with depression.  Patient's anxiety since last visit has been:  Worse  The patient is having other symptoms associated with anxiety.  Any significant life events: No  Patient is feeling anxious or having panic attacks.  Patient has no concerns about alcohol or  drug use.    She eats 2-3 servings of fruits and vegetables daily.She consumes 1 sweetened beverage(s) daily.She exercises with enough effort to increase her heart rate 20 to 29 minutes per day.  She exercises with enough effort to increase her heart rate 3 or less days per week.   She is taking medications regularly.    Today's PHQ-9         PHQ-9 Total Score: 13    PHQ-9 Q9 Thoughts of better off dead/self-harm past 2 weeks :   Not at all    How difficult have these problems made it for you to do your work, take care of things at home, or get along with other people: Somewhat difficult  Today's NOAM-7 Score: 13     Struggling with sleeping - waking up with anxious/worried thoughts.  Depression worsened given worsened anxiety.    Medication Followup of Spironolactone     Taking Medication as prescribed: yes    Side Effects:  None    Medication Helping Symptoms:  NO-would like to discuss increasing dose    Not seeing effects from this.       Review of Systems   Constitutional, HEENT, cardiovascular, pulmonary, GI, , musculoskeletal, neuro, skin, endocrine and psych systems are negative, except as otherwise noted.      Objective           Vitals:  No vitals were obtained today due to virtual visit.    Physical Exam   healthy, alert and no distress  PSYCH: Alert and oriented times 3; coherent speech, normal   rate and volume, able to articulate logical thoughts, able   to abstract reason, no tangential thoughts, no hallucinations   or delusions  Her affect is normal and pleasant  RESP: No cough, no audible wheezing, able to talk in full sentences  Remainder of exam unable to be completed due to telephone visits             Phone call duration: 12 minutes

## 2022-11-08 NOTE — PATIENT INSTRUCTIONS
Referral for Audiology - # [phone].    Will trial increase in Wellbutrin to 300 mg once daily.  Added Hydroxyzine 10-20 mg at bedtime to help with sleep.    Mychart in 2-3 weeks with update on symptoms and/or improvement. If no improvement would recommend adding a daily medication for anxiety.    Follow up sooner if symptoms or problems arise.    Alexandra Hart, LONNY

## 2022-11-17 ENCOUNTER — OFFICE VISIT (OUTPATIENT)
Dept: OBGYN | Facility: CLINIC | Age: 42
End: 2022-11-17
Payer: COMMERCIAL

## 2022-11-17 VITALS
HEIGHT: 65 IN | RESPIRATION RATE: 18 BRPM | WEIGHT: 124 LBS | TEMPERATURE: 97.7 F | BODY MASS INDEX: 20.66 KG/M2 | HEART RATE: 98 BPM | SYSTOLIC BLOOD PRESSURE: 100 MMHG | DIASTOLIC BLOOD PRESSURE: 65 MMHG

## 2022-11-17 DIAGNOSIS — Z12.4 CERVICAL CANCER SCREENING: Primary | ICD-10-CM

## 2022-11-17 DIAGNOSIS — Z00.00 NORMAL BREAST EXAM: ICD-10-CM

## 2022-11-17 DIAGNOSIS — N89.8 VAGINAL DISCHARGE: ICD-10-CM

## 2022-11-17 LAB
CLUE CELLS: ABNORMAL
TRICHOMONAS, WET PREP: ABNORMAL
WBC'S/HIGH POWER FIELD, WET PREP: ABNORMAL
YEAST, WET PREP: ABNORMAL

## 2022-11-17 PROCEDURE — 87210 SMEAR WET MOUNT SALINE/INK: CPT | Performed by: ADVANCED PRACTICE MIDWIFE

## 2022-11-17 PROCEDURE — G0145 SCR C/V CYTO,THINLAYER,RESCR: HCPCS | Performed by: ADVANCED PRACTICE MIDWIFE

## 2022-11-17 PROCEDURE — 99213 OFFICE O/P EST LOW 20 MIN: CPT | Performed by: ADVANCED PRACTICE MIDWIFE

## 2022-11-17 PROCEDURE — 87624 HPV HI-RISK TYP POOLED RSLT: CPT | Performed by: ADVANCED PRACTICE MIDWIFE

## 2022-11-17 NOTE — NURSING NOTE
"Initial /65 (BP Location: Right arm, Patient Position: Chair, Cuff Size: Adult Regular)   Pulse 98   Temp 97.7  F (36.5  C) (Tympanic)   Resp 18   Ht 1.651 m (5' 5\")   Wt 56.2 kg (124 lb)   LMP 10/28/2022   BMI 20.63 kg/m   Estimated body mass index is 20.63 kg/m  as calculated from the following:    Height as of this encounter: 1.651 m (5' 5\").    Weight as of this encounter: 56.2 kg (124 lb). .      "

## 2022-11-17 NOTE — PROGRESS NOTES
SUBJECTIVE:   42 year old female complains of clear, more mucously vaginal discharge for about 2 weeks.  Denies abnormal vaginal bleeding or significant pelvic pain or  fever. No UTI symptoms. Denies history of known exposure to STD.  She is not currently sexually active and uses not hormonal birth control.  Reports no risk for pregnancy.  She had a pap over 3 years ago and would like to do a pap today if possible.  She also requests a breast exam.  She does do self breast exams and has had mammogram.      Patient's last menstrual period was 10/28/2022.    OBJECTIVE:   She appears well, afebrile.  Abdomen: benign, soft, nontender, no masses.  Pelvic Exam: normal vagina and vulva, normal cervix without lesions or tenderness, adenxa normal in size without tenderness, pap smear done, wet mount exam shows no signs of trich, BV or yeast.  Breasts: normal without suspicious masses, skin changes or axillary nodes.  History of breast augmentation.      ASSESSMENT:   no pathogens identified causing these Vaginal Discharge  Cervical cancer screening    PLAN:   GC and chlamydia genprobe declined.  Discussed reasons for discharge changes - Pelvic offered and declined today   Pap collected and wet prep collected   Treatment: No treatment needed.    ROV prn if symptoms persist or worsen.  Discussed normal breast exam.  Screening mammogram offered and declined today.  Reviewed screening recommendations.

## 2022-11-21 LAB
BKR LAB AP GYN ADEQUACY: NORMAL
BKR LAB AP GYN INTERPRETATION: NORMAL
BKR LAB AP HPV REFLEX: NORMAL
BKR LAB AP LMP: NORMAL
BKR LAB AP PREVIOUS ABNORMAL: NORMAL
PATH REPORT.COMMENTS IMP SPEC: NORMAL
PATH REPORT.COMMENTS IMP SPEC: NORMAL
PATH REPORT.RELEVANT HX SPEC: NORMAL

## 2022-11-23 LAB
HUMAN PAPILLOMA VIRUS 16 DNA: NEGATIVE
HUMAN PAPILLOMA VIRUS 18 DNA: NEGATIVE
HUMAN PAPILLOMA VIRUS FINAL DIAGNOSIS: NORMAL
HUMAN PAPILLOMA VIRUS OTHER HR: NEGATIVE

## 2022-12-06 ENCOUNTER — OFFICE VISIT (OUTPATIENT)
Dept: FAMILY MEDICINE | Facility: CLINIC | Age: 42
End: 2022-12-06
Payer: COMMERCIAL

## 2022-12-06 VITALS
TEMPERATURE: 97.6 F | DIASTOLIC BLOOD PRESSURE: 74 MMHG | BODY MASS INDEX: 20.79 KG/M2 | WEIGHT: 124.8 LBS | HEIGHT: 65 IN | OXYGEN SATURATION: 99 % | HEART RATE: 70 BPM | RESPIRATION RATE: 12 BRPM | SYSTOLIC BLOOD PRESSURE: 112 MMHG

## 2022-12-06 DIAGNOSIS — M21.969: ICD-10-CM

## 2022-12-06 DIAGNOSIS — L67.8 ABNORMAL FACIAL HAIR: ICD-10-CM

## 2022-12-06 DIAGNOSIS — Z01.818 PREOP GENERAL PHYSICAL EXAM: Primary | ICD-10-CM

## 2022-12-06 PROCEDURE — 99214 OFFICE O/P EST MOD 30 MIN: CPT | Performed by: NURSE PRACTITIONER

## 2022-12-06 RX ORDER — SPIRONOLACTONE 50 MG/1
50 TABLET, FILM COATED ORAL 2 TIMES DAILY
Qty: 180 TABLET | Refills: 3 | Status: SHIPPED | OUTPATIENT
Start: 2022-12-06 | End: 2023-03-22

## 2022-12-06 ASSESSMENT — PAIN SCALES - GENERAL: PAINLEVEL: NO PAIN (0)

## 2022-12-06 NOTE — PROGRESS NOTES
[facility]  [address] [address]  Phone: [phone]  Primary Provider: Alyssa Hart  Pre-op Performing Provider: ALYSSA HART      PREOPERATIVE EVALUATION:  Today's date: 12/6/2022    Germania Hayden is a 42 year old female who presents for a preoperative evaluation.    Surgical Information:  Surgery/Procedure: Liposuction of calf - Bilateral   Surgery Location: [facility]   Surgeon: Dr. Tonya Go   Surgery Date: 12/12/2022  Time of Surgery: 10:00AM  Where patient plans to recover: At home with family  Fax number for surgical facility: [facility] [phone] Dr. Go [phone]    Type of Anesthesia Anticipated: General    Assessment & Plan     The proposed surgical procedure is considered INTERMEDIATE risk.    Preop general physical exam       Acquired calf asymmetry, unspecified laterality       Abnormal facial hair     - spironolactone (ALDACTONE) 50 MG tablet  Dispense: 180 tablet; Refill: 3          Risks and Recommendations:  The patient has the following additional risks and recommendations for perioperative complications:   - No identified additional risk factors other than previously addressed    Medication Instructions:  Patient is on no chronic medications    RECOMMENDATION:  APPROVAL GIVEN to proceed with proposed procedure, without further diagnostic evaluation.       Subjective     HPI related to upcoming procedure:  Had liposuction for bilateral calves due to excessive fat/tissue April 2022.  Finding deformity and asymmetry in calves post op.    Preop Questions 12/5/2022   1. Have you ever had a heart attack or stroke? No   2. Have you ever had surgery on your heart or blood vessels, such as a stent placement, a coronary artery bypass, or surgery on an artery in your head, neck, heart, or legs? No   3. Do you have chest pain with activity? No   4. Do you have a history of  heart failure? No   5.  Do you currently have a cold, bronchitis or symptoms of other infection? No   6. Do you have a cough, shortness of breath, or wheezing? No   7. Do you or anyone in your family have previous history of blood clots? No   8. Do you or does anyone in your family have a serious bleeding problem such as prolonged bleeding following surgeries or cuts? No   9. Have you ever had problems with anemia or been told to take iron pills? No   10. Have you had any abnormal blood loss such as black, tarry or bloody stools, or abnormal vaginal bleeding? No   11. Have you ever had a blood transfusion? No   12. Are you willing to have a blood transfusion if it is medically needed before, during, or after your surgery? Yes   13. Have you or any of your relatives ever had problems with anesthesia? No   14. Do you have sleep apnea, excessive snoring or daytime drowsiness? No   15. Do you have any artifical heart valves or other implanted medical devices like a pacemaker, defibrillator, or continuous glucose monitor? No   16. Do you have artificial joints? No   17. Are you allergic to latex? No   18. Is there any chance that you may be pregnant? No       Health Care Directive:  Patient does not have a Health Care Directive or Living Will: Discussed advance care planning with patient; however, patient declined at this time.    Preoperative Review of :   reviewed - no record of controlled substances prescribed.       Status of Chronic Conditions:  See problem list for active medical problems.  Problems all longstanding and stable, except as noted/documented.  See ROS for pertinent symptoms related to these conditions.      Review of Systems  CONSTITUTIONAL: NEGATIVE for fever, chills, change in weight  INTEGUMENTARY/SKIN: NEGATIVE for worrisome rashes, moles or lesions  EYES: NEGATIVE for vision changes or irritation  ENT/MOUTH: NEGATIVE for ear, mouth and throat problems  RESP: NEGATIVE for significant cough or SOB  CV: NEGATIVE for  chest pain, palpitations or peripheral edema  GI: NEGATIVE for nausea, abdominal pain, heartburn, or change in bowel habits  : NEGATIVE for frequency, dysuria, or hematuria  MUSCULOSKELETAL: NEGATIVE for significant arthralgias or myalgia  NEURO: NEGATIVE for weakness, dizziness or paresthesias  ENDOCRINE: NEGATIVE for temperature intolerance, skin/hair changes  HEME: NEGATIVE for bleeding problems  PSYCHIATRIC: NEGATIVE for changes in mood or affect    Patient Active Problem List    Diagnosis Date Noted     Moderate recurrent major depression (H) 12/19/2019     Priority: Medium     H/O cold sores 04/19/2018     Priority: Medium     Uterovaginal prolapse 10/27/2017     Priority: Medium     Trial of size 4 ring with support pessary 3/2018 due to 2-3+ cystocele, 2+ cervical decensus, 1+ rectocele with strain  Consider future TOTAL VAGINAL HYSTERECTOMY, A & P, SACROSPINOUS VAULT SUSPENSION  Refit with size 6 ruing with support pessary ;3/29/2019         Vulvar varicose veins 04/21/2014     Priority: Medium      Past Medical History:   Diagnosis Date     ASCUS favor benign 12/2011    neg HPV, repeat Pap + HPV cotesting in 3 years     Chickenpox      Depression      Eating disorder     bulemia- age 18 to 32     Spider veins 5-6 years ago     Uterovaginal prolapse 10/27/2017     Past Surgical History:   Procedure Laterality Date     ARTHROSCOPIC RECONSTRUCTION ANTERIOR CRUCIATE LIGAMENT  x3    twice on L knee and once on Rt knee     ESOPHAGOSCOPY, GASTROSCOPY, DUODENOSCOPY (EGD), COMBINED N/A 10/10/2019    Procedure: ESOPHAGOGASTRODUODENOSCOPY, WITH BIOPSY;  Surgeon: Shirlene Wilson MD;  Location: [facility] OR     MAMMOPLASTY AUGMENTATION Bilateral 2015     wisdom teeth       ZZC BREAST AUGMENTATION       Current Outpatient Medications   Medication Sig Dispense Refill     acyclovir (ZOVIRAX) 200 MG capsule TAKE 1 CAPSULE BY MOUTH TWICE DAILY . 180 capsule 3     buPROPion (WELLBUTRIN XL) 150 MG 24 hr tablet Take 2  tablets (300 mg) by mouth every morning 180 tablet 1     hydrOXYzine (ATARAX) 10 MG tablet Take 1-2 tablets (10-20 mg) by mouth nightly as needed for itching 60 tablet 1     Multiple Vitamins-Minerals (MULTIVITAMIN PO) Take by mouth daily       spironolactone (ALDACTONE) 100 MG tablet Take 1 tablet (100 mg) by mouth 2 times daily 60 tablet 3     tretinoin (RETIN-A) 0.025 % external cream Use a pea sized amount every other night to nightly as tolerated 45 g 0       No Known Allergies     Social History     Tobacco Use     Smoking status: Never     Smokeless tobacco: Never   Substance Use Topics     Alcohol use: Yes     Comment: rare     Family History   Problem Relation Age of Onset     Cancer Paternal Grandmother 60        ovarian     Breast Cancer Paternal Grandmother      Cancer Maternal Grandmother         lymphoma     Cancer Maternal Grandfather         lung ,bone     Cardiovascular Paternal Grandfather         MI     Mental Illness Brother         schizoaffective disorder     Depression Mother      Colon Polyps Mother      Gastrointestinal Disease Brother         ulcerative colitis     Melanoma No family hx of      History   Drug Use No         Objective     LMP 11/18/2022 (Approximate)     Physical Exam    GENERAL APPEARANCE: healthy, alert and no distress     EYES: EOMI, PERRL     HENT: ear canals and TM's normal and nose and mouth without ulcers or lesions     NECK: no adenopathy, no asymmetry, masses, or scars and thyroid normal to palpation     RESP: lungs clear to auscultation - no rales, rhonchi or wheezes     CV: regular rates and rhythm, normal S1 S2, no S3 or S4 and no murmur, click or rub     ABDOMEN:  soft, nontender, no HSM or masses and bowel sounds normal     MS: extremities normal- no gross deformities noted, no evidence of inflammation in joints, FROM in all extremities.     SKIN: no suspicious lesions or rashes     NEURO: Normal strength and tone, sensory exam grossly normal, mentation intact  and speech normal     PSYCH: mentation appears normal. and affect normal/bright     LYMPHATICS: No cervical adenopathy    Recent Labs   Lab Test 05/30/22  0004 04/13/22  1355   HGB 13.2  --      --     137   POTASSIUM 3.8 3.9   CR 0.73 0.71        Diagnostics:  No labs were ordered during this visit.   No EKG required, no history of coronary heart disease, significant arrhythmia, peripheral arterial disease or other structural heart disease.    Revised Cardiac Risk Index (RCRI):  The patient has the following serious cardiovascular risks for perioperative complications:   - No serious cardiac risks = 0 points     RCRI Interpretation: 0 points: Class I (very low risk - 0.4% complication rate)           Signed Electronically by: Alexandra Hart NP  Copy of this evaluation report is provided to requesting physician.

## 2022-12-06 NOTE — PATIENT INSTRUCTIONS
Preparing for Your Surgery  Getting started  A nurse will call you to review your health history and instructions. They will give you an arrival time based on your scheduled surgery time. Please be ready to share:    Your doctor's clinic name and phone number    Your medical, surgical, and anesthesia history    A list of allergies and sensitivities    A list of medicines, including herbal treatments and over-the-counter drugs    Whether the patient has a legal guardian (ask how to send us the papers in advance)  Please tell us if you're pregnant--or if there's any chance you might be pregnant. Some surgeries may injure a fetus (unborn baby), so they require a pregnancy test. Surgeries that are safe for a fetus don't always need a test, and you can choose whether to have one.   If you have a child who's having surgery, please ask for a copy of Preparing for Your Child's Surgery.    Preparing for surgery    Within 10 to 30 days of surgery: Have a pre-op exam (sometimes called an H&P, or History and Physical). This can be done at a clinic or pre-operative center.  ? If you're having a , you may not need this exam. Talk to your care team.    At your pre-op exam, talk to your care team about all medicines you take. If you need to stop any medicines before surgery, ask when to start taking them again.  ? We do this for your safety. Many medicines can make you bleed too much during surgery. Some change how well surgery (anesthesia) drugs work.    Call your insurance company to let them know you're having surgery. (If you don't have insurance, call [phone].)    Call your clinic if there's any change in your health. This includes signs of a cold or flu (sore throat, runny nose, cough, rash, fever). It also includes a scrape or scratch near the surgery site.    If you have questions on the day of surgery, call your hospital or surgery center.  COVID testing  You may need to be tested for COVID-19 before having  surgery. If so, we will give you instructions (or click here).  Eating and drinking guidelines  For your safety: Unless your surgeon tells you otherwise, follow the guidelines below.    Eat and drink as usual until 8 hours before you arrive for surgery. After that, no food or milk.    Drink clear liquids until 2 hours before you arrive. These are liquids you can see through, like water, Gatorade, and Propel Water. They also include plain black coffee and tea (no cream or milk), candy, and breath mints. You can spit out gum when you arrive.    If you drink alcohol: Stop drinking it the night before surgery.    If your care team tells you to take medicine on the morning of surgery, it's okay to take it with a sip of water.  Preventing infection    Shower or bathe the night before and morning of your surgery. Follow the instructions your clinic gave you. (If no instructions, use regular soap.)    Don't shave or clip hair near your surgery site. We'll remove the hair if needed.    Don't smoke or vape the morning of surgery. You may chew nicotine gum up to 2 hours before surgery. A nicotine patch is okay.  ? Note: Some surgeries require you to completely quit smoking and nicotine. Check with your surgeon.    Your care team will make every effort to keep you safe from infection. We will:  ? Clean our hands often with soap and water (or an alcohol-based hand rub).  ? Clean the skin at your surgery site with a special soap that kills germs.  ? Give you a special gown to keep you warm. (Cold raises the risk of infection.)  ? Wear special hair covers, masks, gowns and gloves during surgery.  ? Give antibiotic medicine, if prescribed. Not all surgeries need antibiotics.  What to bring on the day of surgery    Photo ID and insurance card    Copy of your health care directive, if you have one    Glasses and hearing aids (bring cases)  ? You can't wear contacts during surgery    Inhaler and eye drops, if you use them (tell us  about these when you arrive)    CPAP machine or breathing device, if you use them    A few personal items, if spending the night    If you have . . .  ? A pacemaker, ICD (cardiac defibrillator) or other implant: Bring the ID card.  ? An implanted stimulator: Bring the remote control.  ? A legal guardian: Bring a copy of the certified (court-stamped) guardianship papers.  Please remove any jewelry, including body piercings. Leave jewelry and other valuables at home.  If you're going home the day of surgery    You must have a responsible adult drive you home. They should stay with you overnight as well.    If you don't have someone to stay with you, and you aren't safe to go home alone, we may keep you overnight. Insurance often won't pay for this.  After surgery  If it's hard to control your pain or you need more pain medicine, please call your surgeon's office.  Questions?   If you have any questions for your care team, list them here: _________________________________________________________________________________________________________________________________________________________________________ ____________________________________ ____________________________________ ____________________________________  For informational purposes only. Not to replace the advice of your health care provider. Copyright   2003, 2019 [facility]. All rights reserved. Clinically reviewed by Irene Chun MD. [employer] [id number] - REV 10/22.

## 2023-01-31 ENCOUNTER — OFFICE VISIT (OUTPATIENT)
Dept: AUDIOLOGY | Facility: CLINIC | Age: 43
End: 2023-01-31
Attending: NURSE PRACTITIONER
Payer: COMMERCIAL

## 2023-01-31 DIAGNOSIS — H90.0 CONDUCTIVE HEARING LOSS, BILATERAL: Primary | ICD-10-CM

## 2023-01-31 PROCEDURE — 92557 COMPREHENSIVE HEARING TEST: CPT | Performed by: AUDIOLOGIST

## 2023-01-31 PROCEDURE — 92550 TYMPANOMETRY & REFLEX THRESH: CPT | Performed by: AUDIOLOGIST

## 2023-01-31 NOTE — PROGRESS NOTES
AUDIOLOGY REPORT    SUBJECTIVE:  Germania Hayden is a 42 year old female who was seen in the Audiology Clinic at the [facility] for audiologic evaluation, referred by Alexandra Hart N.P. .The patient reports a history of difficulty hearing her family. The patient denies  bilateral otalgia and bilateral drainage.  The patient notes difficulty with communication in a variety of listening situations.  They were accompanied today by their self.    OBJECTIVE:  Abuse Screening:  Do you feel unsafe at home or work/school? No  Do you feel threatened by someone? No  Does anyone try to keep you from having contact with others, or doing things outside of your home? No  Physical signs of abuse present? No     Fall Risk Screen:  1. Have you fallen two or more times in the past year? No  2. Have you fallen and had an injury in the past year? No      Otoscopic exam indicates ears are clear of cerumen bilaterally     Pure Tone Thresholds assessed using conventional audiometry with good  reliability from 250-8000 Hz bilaterally using insert earphones and circumaural headphones     RIGHT:  mild rising to normal conductive hearing loss    LEFT:    mild rising to normal conductive hearing loss    Tympanogram:    RIGHT: restricted eardrum mobility     LEFT:   restricted eardrum mobility     Reflexes (reported by stimulus ear):  RIGHT: Ipsilateral is present at normal levels  RIGHT: Contralateral is present at normal levels  LEFT:   Ipsilateral is present at normal levels  LEFT:   Contralateral is present at normal levels      Speech Reception Threshold:    RIGHT: 20 dB HL    LEFT:   25 dB HL  Word Recognition Score:     RIGHT: 96% at 60 dB HL using NU-6 recorded word list.    LEFT:   96% at 65 dB HL using NU-6 recorded word list.      ASSESSMENT:     ICD-10-CM    1. Conductive hearing loss, bilateral  H90.0            Today s results were discussed with the patient in detail.     PLAN:  Patient was  counseled regarding hearing loss and impact on communication.  It is recommended that the patient be seen by Dr. Rothman for medical evaluation of their ears and hearing evaluation.  Please call this clinic with questions regarding these results or recommendations.        Delphine MEONN, #[id number]

## 2023-03-20 NOTE — PROGRESS NOTES
Chief Complaint   Patient presents with     Follow Up     Go over hearing test done on 1/31/2023- gradual hearing loss in both ears per patient     History of Present Illness   Germania Hayden is a 42 year old female who presents to me today for ear evaluation.  I am seeing this patient in consultation for bilateral sensorineural hearing loss at the request of the provider Alexandra Hart CNP. The patient reports slight decreased hearing for the last several years.  It was gradual in onset.  The patient has noticed may be slight gradual decline in hearing but overall her ears have been stable.  She denies any otalgia or otorrhea.  No bloody otorrhea.  She had ear tubes as a child, otherwise no significant history of ear surgery.  She did have some recurrent infections when she was younger.  She is a non-smoker.      The patient underwent an audiogram performed 1/31/2023.  My review of the audiogram showed mild conductive hearing loss in both ears.  Pure-tone average was 16 dB on the right and 21 dB on the left.  Speech reception threshold was 20 dB on the right and 25 dB on the left.  The patient had 96% word recognition on the right and 96% word recognition on the left.  The patient had a intermediate volume type B tympanogram on the right and a low volume type B tympanogram on the left.    Past Medical History  Patient Active Problem List   Diagnosis     Vulvar varicose veins     Uterovaginal prolapse     H/O cold sores     Moderate recurrent major depression (H)     Current Medications     Current Outpatient Medications:      acyclovir (ZOVIRAX) 200 MG capsule, Take 1 capsule by mouth twice daily, Disp: 180 capsule, Rfl: 0     buPROPion (WELLBUTRIN XL) 150 MG 24 hr tablet, Take 2 tablets (300 mg) by mouth every morning, Disp: 180 tablet, Rfl: 1     hydrOXYzine (ATARAX) 10 MG tablet, Take 1-2 tablets (10-20 mg) by mouth nightly as needed for itching, Disp: 60 tablet, Rfl: 1     Multiple Vitamins-Minerals  (MULTIVITAMIN PO), Take by mouth daily, Disp: , Rfl:      tretinoin (RETIN-A) 0.025 % external cream, Use a pea sized amount every other night to nightly as tolerated, Disp: 45 g, Rfl: 0    Allergies  No Known Allergies    Social History   Social History     Socioeconomic History     Marital status:      Spouse name: Eris     Number of children: 2   Occupational History     Employer: [employer]   Tobacco Use     Smoking status: Never     Smokeless tobacco: Never   Vaping Use     Vaping Use: Never used   Substance and Sexual Activity     Alcohol use: Yes     Comment: rare     Drug use: No     Sexual activity: Yes     Partners: Male   Other Topics Concern     Parent/sibling w/ CABG, MI or angioplasty before 65F 55M? No       Family History  Family History   Problem Relation Age of Onset     Depression Mother      Colon Polyps Mother      Mental Illness Brother         schizoaffective disorder     Gastrointestinal Disease Brother         ulcerative colitis     Cancer Maternal Grandmother         lymphoma     Cancer Maternal Grandfather         lung ,bone     Cancer Paternal Grandmother 60        ovarian     Breast Cancer Paternal Grandmother      Cardiovascular Paternal Grandfather         MI     Melanoma No family hx of        Review of Systems  As per HPI and PMHx, otherwise 10+ comprehensive system review is negative.    Physical Exam  /72 (BP Location: Right arm, Patient Position: Chair, Cuff Size: Adult Regular)   Pulse 87   Temp 98.2  F (36.8  C) (Tympanic)   Wt 54.4 kg (120 lb)   BMI 19.97 kg/m    GENERAL: Patient is a pleasant, cooperative 42 year old female in no acute distress.  HEAD: Normocephalic, atraumatic.  Hair and scalp are normal.  EYES: Pupils are equal, round, reactive to light and accommodation.  Extraocular movements are intact.  The sclera nonicteric without injection.  The extraocular structures are normal.  EARS: See below.    NOSE: Nares are patent.   Nasal mucosa is pink and moist.  Negative anterior rhinoscopy.  NEUROLOGIC: Cranial nerves II through XII are grossly intact.  Voice is strong.  Patient is House-Brackmann I/VI bilaterally.  CARDIOVASCULAR: Extremities are warm and well-perfused.  No significant peripheral edema.  RESPIRATORY: Patient has nonlabored breathing without cough, wheeze, stridor.  PSYCHIATRIC: Patient is alert and oriented.  Mood and affect appear normal.  SKIN: Warm and dry.  No scalp, face, or neck lesions noted.    Physical Exam and Procedure    EARS: Normal shape and symmetry.  No tenderness when palpating the mastoid or tragal areas bilaterally.  The ears were then examined under the otic binocular microscope to perform cerumen removal.  Otoscopic exam on the right reveals post clear canal with a few hairs in the canal and against the eardrum. The right tympanic membrane was round, intact without evidence of effusion.  There is some mild myringosclerosis.  There is no significant retraction.  No granulation, drainage, or evidence of cholesteatoma.          Attention was turned to the left ear.  Otoscopic exam on the left reveals a mostly clear canal with a few hairs in the ear canal and against the eardrum.  The left tympanic membrane was round, intact without evidence of effusion.  She did have some moderate myringosclerosis and a moderate amount of retraction.  There are no deep retraction pockets.  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  No granulation, drainage, or evidence of cholesteatoma.          Assessment and Plan     ICD-10-CM    1. Conductive hearing loss, bilateral  H90.0 Microscopy, Binocular      2. Tympanic membrane retraction, left  H73.892 Microscopy, Binocular        It was my pleasure seeing Germania Hayden today in clinic.  The patient presents today with mild conductive hearing loss.  She has evidence of eustachian tube dysfunction and some myringosclerosis which is likely impacting her tympanic membrane  mobility.  Given the mild nature of her hearing loss, I am not sure any intervention would be required.  She does have problems when she flies and so we discussed some techniques for her ears while flying.  We spent the remainder of today's visit on education. We discussed hearing protection in noisy environments.    The patient will follow up as necessary for worsening symptoms or changes in symptoms. I have also recommended repeat audiogram in 1-2 years, or sooner if symptoms warrant.      Alexandre Rothman MD  Department of Otolaryngology-Head and Neck Surgery  [facility]

## 2023-03-22 ENCOUNTER — OFFICE VISIT (OUTPATIENT)
Dept: OTOLARYNGOLOGY | Facility: CLINIC | Age: 43
End: 2023-03-22
Payer: COMMERCIAL

## 2023-03-22 VITALS
SYSTOLIC BLOOD PRESSURE: 111 MMHG | WEIGHT: 120 LBS | HEART RATE: 87 BPM | DIASTOLIC BLOOD PRESSURE: 72 MMHG | BODY MASS INDEX: 19.97 KG/M2 | TEMPERATURE: 98.2 F

## 2023-03-22 DIAGNOSIS — H90.0 CONDUCTIVE HEARING LOSS, BILATERAL: Primary | ICD-10-CM

## 2023-03-22 DIAGNOSIS — H73.892 TYMPANIC MEMBRANE RETRACTION, LEFT: ICD-10-CM

## 2023-03-22 PROCEDURE — 99243 OFF/OP CNSLTJ NEW/EST LOW 30: CPT | Performed by: OTOLARYNGOLOGY

## 2023-03-22 PROCEDURE — 92504 EAR MICROSCOPY EXAMINATION: CPT | Performed by: OTOLARYNGOLOGY

## 2023-03-22 ASSESSMENT — PAIN SCALES - GENERAL: PAINLEVEL: NO PAIN (0)

## 2023-03-22 NOTE — PATIENT INSTRUCTIONS
Per physician's instructions    Ear instructions before flying   1) Oral decongestant (Sudafed or Coricidin HBP with high blood pressure) within 6 hours before the flight  2) Afrin (oxymetazoline) 1-2 hours before flight  3) Filtered ear plugs (EarPlanes) on take of and landing   4) Chew gum on take of and landing

## 2023-03-22 NOTE — LETTER
3/22/2023         RE: Germania Hayden  [address]  [place] [address]        Dear Colleague,    Thank you for referring your patient, Germania Hayden, to the [facility]. Please see a copy of my visit note below.    Chief Complaint   Patient presents with     Follow Up     Go over hearing test done on 1/31/2023- gradual hearing loss in both ears per patient     History of Present Illness   Germania Hayden is a 42 year old female who presents to me today for ear evaluation.  I am seeing this patient in consultation for bilateral sensorineural hearing loss at the request of the provider Alexandra Hart CNP. The patient reports slight decreased hearing for the last several years.  It was gradual in onset.  The patient has noticed may be slight gradual decline in hearing but overall her ears have been stable.  She denies any otalgia or otorrhea.  No bloody otorrhea.  She had ear tubes as a child, otherwise no significant history of ear surgery.  She did have some recurrent infections when she was younger.  She is a non-smoker.      The patient underwent an audiogram performed 1/31/2023.  My review of the audiogram showed mild conductive hearing loss in both ears.  Pure-tone average was 16 dB on the right and 21 dB on the left.  Speech reception threshold was 20 dB on the right and 25 dB on the left.  The patient had 96% word recognition on the right and 96% word recognition on the left.  The patient had a intermediate volume type B tympanogram on the right and a low volume type B tympanogram on the left.    Past Medical History  Patient Active Problem List   Diagnosis     Vulvar varicose veins     Uterovaginal prolapse     H/O cold sores     Moderate recurrent major depression (H)     Current Medications     Current Outpatient Medications:      acyclovir (ZOVIRAX) 200 MG capsule, Take 1 capsule by mouth twice daily, Disp: 180 capsule, Rfl: 0     buPROPion (WELLBUTRIN XL)  150 MG 24 hr tablet, Take 2 tablets (300 mg) by mouth every morning, Disp: 180 tablet, Rfl: 1     hydrOXYzine (ATARAX) 10 MG tablet, Take 1-2 tablets (10-20 mg) by mouth nightly as needed for itching, Disp: 60 tablet, Rfl: 1     Multiple Vitamins-Minerals (MULTIVITAMIN PO), Take by mouth daily, Disp: , Rfl:      tretinoin (RETIN-A) 0.025 % external cream, Use a pea sized amount every other night to nightly as tolerated, Disp: 45 g, Rfl: 0    Allergies  No Known Allergies    Social History   Social History     Socioeconomic History     Marital status:      Spouse name: Eris     Number of children: 2   Occupational History     Employer: [employer]   Tobacco Use     Smoking status: Never     Smokeless tobacco: Never   Vaping Use     Vaping Use: Never used   Substance and Sexual Activity     Alcohol use: Yes     Comment: rare     Drug use: No     Sexual activity: Yes     Partners: Male   Other Topics Concern     Parent/sibling w/ CABG, MI or angioplasty before 65F 55M? No       Family History  Family History   Problem Relation Age of Onset     Depression Mother      Colon Polyps Mother      Mental Illness Brother         schizoaffective disorder     Gastrointestinal Disease Brother         ulcerative colitis     Cancer Maternal Grandmother         lymphoma     Cancer Maternal Grandfather         lung ,bone     Cancer Paternal Grandmother 60        ovarian     Breast Cancer Paternal Grandmother      Cardiovascular Paternal Grandfather         MI     Melanoma No family hx of        Review of Systems  As per HPI and PMHx, otherwise 10+ comprehensive system review is negative.    Physical Exam  /72 (BP Location: Right arm, Patient Position: Chair, Cuff Size: Adult Regular)   Pulse 87   Temp 98.2  F (36.8  C) (Tympanic)   Wt 54.4 kg (120 lb)   BMI 19.97 kg/m    GENERAL: Patient is a pleasant, cooperative 42 year old female in no acute distress.  HEAD: Normocephalic, atraumatic.  Hair  and scalp are normal.  EYES: Pupils are equal, round, reactive to light and accommodation.  Extraocular movements are intact.  The sclera nonicteric without injection.  The extraocular structures are normal.  EARS: See below.    NOSE: Nares are patent.  Nasal mucosa is pink and moist.  Negative anterior rhinoscopy.  NEUROLOGIC: Cranial nerves II through XII are grossly intact.  Voice is strong.  Patient is House-Brackmann I/VI bilaterally.  CARDIOVASCULAR: Extremities are warm and well-perfused.  No significant peripheral edema.  RESPIRATORY: Patient has nonlabored breathing without cough, wheeze, stridor.  PSYCHIATRIC: Patient is alert and oriented.  Mood and affect appear normal.  SKIN: Warm and dry.  No scalp, face, or neck lesions noted.    Physical Exam and Procedure    EARS: Normal shape and symmetry.  No tenderness when palpating the mastoid or tragal areas bilaterally.  The ears were then examined under the otic binocular microscope to perform cerumen removal.  Otoscopic exam on the right reveals post clear canal with a few hairs in the canal and against the eardrum. The right tympanic membrane was round, intact without evidence of effusion.  There is some mild myringosclerosis.  There is no significant retraction.  No granulation, drainage, or evidence of cholesteatoma.          Attention was turned to the left ear.  Otoscopic exam on the left reveals a mostly clear canal with a few hairs in the ear canal and against the eardrum.  The left tympanic membrane was round, intact without evidence of effusion.  She did have some moderate myringosclerosis and a moderate amount of retraction.  There are no deep retraction pockets.  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  No granulation, drainage, or evidence of cholesteatoma.          Assessment and Plan     ICD-10-CM    1. Conductive hearing loss, bilateral  H90.0 Microscopy, Binocular      2. Tympanic membrane retraction, left  H73.892 Microscopy, Binocular         It was my pleasure seeing Germania Hayden today in clinic.  The patient presents today with mild conductive hearing loss.  She has evidence of eustachian tube dysfunction and some myringosclerosis which is likely impacting her tympanic membrane mobility.  Given the mild nature of her hearing loss, I am not sure any intervention would be required.  She does have problems when she flies and so we discussed some techniques for her ears while flying.  We spent the remainder of today's visit on education. We discussed hearing protection in noisy environments.    The patient will follow up as necessary for worsening symptoms or changes in symptoms. I have also recommended repeat audiogram in 1-2 years, or sooner if symptoms warrant.      Alexandre Rothman MD  Department of Otolaryngology-Head and Neck Surgery  [facility]         Again, thank you for allowing me to participate in the care of your patient.        Sincerely,        Alexandre Rothman MD

## 2023-03-22 NOTE — NURSING NOTE
"Initial /72 (BP Location: Right arm, Patient Position: Chair, Cuff Size: Adult Regular)   Pulse 87   Temp 98.2  F (36.8  C) (Tympanic)   Wt 54.4 kg (120 lb)   BMI 19.97 kg/m   Estimated body mass index is 19.97 kg/m  as calculated from the following:    Height as of 12/6/22: 1.651 m (5' 5\").    Weight as of this encounter: 54.4 kg (120 lb). .    Samantha Hinton LPN    "

## 2023-05-15 DIAGNOSIS — Z86.19 H/O COLD SORES: ICD-10-CM

## 2023-05-15 RX ORDER — ACYCLOVIR 200 MG/1
CAPSULE ORAL
Qty: 180 CAPSULE | Refills: 0 | Status: SHIPPED | OUTPATIENT
Start: 2023-05-15 | End: 2023-08-03

## 2023-05-23 ASSESSMENT — ANXIETY QUESTIONNAIRES
3. WORRYING TOO MUCH ABOUT DIFFERENT THINGS: SEVERAL DAYS
2. NOT BEING ABLE TO STOP OR CONTROL WORRYING: SEVERAL DAYS
GAD7 TOTAL SCORE: 10
5. BEING SO RESTLESS THAT IT IS HARD TO SIT STILL: SEVERAL DAYS
1. FEELING NERVOUS, ANXIOUS, OR ON EDGE: NEARLY EVERY DAY
7. FEELING AFRAID AS IF SOMETHING AWFUL MIGHT HAPPEN: NOT AT ALL
7. FEELING AFRAID AS IF SOMETHING AWFUL MIGHT HAPPEN: NOT AT ALL
4. TROUBLE RELAXING: NEARLY EVERY DAY
8. IF YOU CHECKED OFF ANY PROBLEMS, HOW DIFFICULT HAVE THESE MADE IT FOR YOU TO DO YOUR WORK, TAKE CARE OF THINGS AT HOME, OR GET ALONG WITH OTHER PEOPLE?: SOMEWHAT DIFFICULT
6. BECOMING EASILY ANNOYED OR IRRITABLE: SEVERAL DAYS
IF YOU CHECKED OFF ANY PROBLEMS ON THIS QUESTIONNAIRE, HOW DIFFICULT HAVE THESE PROBLEMS MADE IT FOR YOU TO DO YOUR WORK, TAKE CARE OF THINGS AT HOME, OR GET ALONG WITH OTHER PEOPLE: SOMEWHAT DIFFICULT
GAD7 TOTAL SCORE: 10

## 2023-05-30 ENCOUNTER — VIRTUAL VISIT (OUTPATIENT)
Dept: FAMILY MEDICINE | Facility: CLINIC | Age: 43
End: 2023-05-30
Payer: COMMERCIAL

## 2023-05-30 DIAGNOSIS — F43.23 SITUATIONAL MIXED ANXIETY AND DEPRESSIVE DISORDER: ICD-10-CM

## 2023-05-30 DIAGNOSIS — F33.1 MODERATE RECURRENT MAJOR DEPRESSION (H): ICD-10-CM

## 2023-05-30 PROCEDURE — 96127 BRIEF EMOTIONAL/BEHAV ASSMT: CPT | Mod: 95 | Performed by: NURSE PRACTITIONER

## 2023-05-30 PROCEDURE — 99214 OFFICE O/P EST MOD 30 MIN: CPT | Mod: 95 | Performed by: NURSE PRACTITIONER

## 2023-05-30 RX ORDER — HYDROXYZINE HYDROCHLORIDE 10 MG/1
10 TABLET, FILM COATED ORAL EVERY 8 HOURS PRN
Qty: 30 TABLET | Refills: 1 | Status: SHIPPED | OUTPATIENT
Start: 2023-05-30 | End: 2024-03-22

## 2023-05-30 ASSESSMENT — ANXIETY QUESTIONNAIRES: GAD7 TOTAL SCORE: 10

## 2023-05-30 NOTE — PROGRESS NOTES
Germania is a 43 year old who is being evaluated via a billable telephone visit.      What phone number would you like to be contacted at? [phone]  How would you like to obtain your AVS? Nimbuzzharcarissa   Distant Location (provider location):  On-site    Assessment & Plan     Moderate recurrent major depression (H)  Discussed reducing dose of Wellbutrin to 150 mg as this may be the cause for increasing anxiety and restlessness. May use Hydroxyzine prn for anxiety.  If not improvement would add daily Lexapro with Wellbutrin 150 or 300 mg depending on response.  Patient stated understanding and will [facility].    - hydrOXYzine (ATARAX) 10 MG tablet  Dispense: 30 tablet; Refill: 1    Situational mixed anxiety and depressive disorder     - hydrOXYzine (ATARAX) 10 MG tablet  Dispense: 30 tablet; Refill: 1          See Patient Instructions    Alexandra Hart NP  [facility]    Davi Solo is a 43 year old, presenting for the following health issues:  Anxiety        5/30/2023     2:36 PM   Additional Questions   Roomed by Karsten WOLFE CMA   Accompanied by self     History of Present Illness       Mental Health Follow-up:  Patient presents to follow-up on Anxiety.    Patient's anxiety since last visit has been:  Worse  The patient is not having other symptoms associated with anxiety.  Any significant life events: No  Patient is not feeling anxious or having panic attacks.  Patient has no concerns about alcohol or drug use.    She eats 2-3 servings of fruits and vegetables daily.She consumes 0 sweetened beverage(s) daily.She exercises with enough effort to increase her heart rate 20 to 29 minutes per day.  She exercises with enough effort to increase her heart rate 5 days per week.   She is taking medications regularly.  Today's NOAM-7 Score: 10     Overall mood is good, but anxious more days then not. Just finished grad school and feels like she should be feeling better.  Getting cold sore  easily - getting these more often.  Taking Wellbutrin 300 mg once daily every morning.    Reports exercise and walking helps and has been more active.    Review of Systems   Constitutional, HEENT, cardiovascular, pulmonary, GI, , musculoskeletal, neuro, skin, endocrine and psych systems are negative, except as otherwise noted.      Objective           Vitals:  No vitals were obtained today due to virtual visit.    Physical Exam   healthy, alert and no distress  PSYCH: Alert and oriented times 3; coherent speech, normal   rate and volume, able to articulate logical thoughts, able   to abstract reason, no tangential thoughts, no hallucinations   or delusions  Her affect is anxious  RESP: No cough, no audible wheezing, able to talk in full sentences  Remainder of exam unable to be completed due to telephone visits           Phone call duration: 12 minutes

## 2023-06-02 ENCOUNTER — HEALTH MAINTENANCE LETTER (OUTPATIENT)
Age: 43
End: 2023-06-02

## 2023-06-06 ENCOUNTER — OFFICE VISIT (OUTPATIENT)
Dept: UROLOGY | Facility: CLINIC | Age: 43
End: 2023-06-06
Attending: OBSTETRICS & GYNECOLOGY
Payer: COMMERCIAL

## 2023-06-06 ENCOUNTER — MYC MEDICAL ADVICE (OUTPATIENT)
Dept: FAMILY MEDICINE | Facility: CLINIC | Age: 43
End: 2023-06-06

## 2023-06-06 VITALS
HEIGHT: 65 IN | HEART RATE: 99 BPM | SYSTOLIC BLOOD PRESSURE: 117 MMHG | WEIGHT: 125 LBS | BODY MASS INDEX: 20.83 KG/M2 | DIASTOLIC BLOOD PRESSURE: 77 MMHG

## 2023-06-06 DIAGNOSIS — N81.11 CYSTOCELE, MIDLINE: ICD-10-CM

## 2023-06-06 DIAGNOSIS — N81.6 RECTOCELE: ICD-10-CM

## 2023-06-06 DIAGNOSIS — F33.1 MODERATE RECURRENT MAJOR DEPRESSION (H): ICD-10-CM

## 2023-06-06 DIAGNOSIS — N81.4 UTEROVAGINAL PROLAPSE: Primary | ICD-10-CM

## 2023-06-06 PROCEDURE — 99215 OFFICE O/P EST HI 40 MIN: CPT | Performed by: OBSTETRICS & GYNECOLOGY

## 2023-06-06 PROCEDURE — 99213 OFFICE O/P EST LOW 20 MIN: CPT | Performed by: OBSTETRICS & GYNECOLOGY

## 2023-06-06 ASSESSMENT — PAIN SCALES - GENERAL: PAINLEVEL: NO PAIN (0)

## 2023-06-06 NOTE — Clinical Note
Hi team, I forgot to put in this information for this patient when I placed the surgery request. Here it is   Performing surgeon: Self  Surgeon Procedure time (incision-close in minutes): 200 minutes  Surgery location: [facility] (or [facility] if available)  Urgency of Surgery?: Patients Choice/ Next Available  Is this a multi surgeon case?: No If yes, please list the department and/or specific surgeon  COVID test needed?: No  If yes, please place order for PCR test  H&P to be completed by?:PCP  Postop appointment?:2 week virtual and 6 weeks in person  Time off work: Time Off Work: 6 Weeks  If the patient is receiving gender-affirming care, did you inform them of the need for two letters of support: NA  Other comments:   Please make in virtual presurgical counseling appointment 1-2 weeks prior to surgery with Dr Eubanks    Current BMI: BMI Readings from Last 1 Encounters: 06/06/23 : 20.80 kg/m

## 2023-06-06 NOTE — LETTER
2023       RE: Germania Hayden  [address]  [place] [address]     Dear Colleague,    Thank you for referring your patient, Germania Hayden, to the [facility] [place] [facility]. Please see a copy of my visit note below.    Ms Emerson is here to follow up for her known stage 2 uterovaginal prolapse with a rectocele and cystocele. I last saw her a new patient on 21 and at that time she wanted to try pelvic PT first. She wasn't interested in pessary as this has not helped in the past. Today she says that she had done pelvic PT for several months but has not noticed an improvement. She says her bulge symptoms have worsened and now she can see the bulge outside the vagina from time to time especially if she is doing physically exertive activities or straining to have a bowel movement. She is really bothered by this and says it is limiting her ability to be active.     Denies any stress or urgency incontinence. Has slow stream of urine but feels she empties her bladder and bowel ok. No fecal incontinence or defecatory issues.       This note was copied and pasted from Dr Eubanks on 21      CC: vaginal bulge    HPI:  Germania Hayden is a 41 year old  female who presents for evaluation of pelvic organ prolapse.   She has a known uterovaginal prolapse which was evaluated by her OBGYN at [facility] a couple of years ago and at that time, she was not too symptomatic and had resorted to expectant management. Today, she says she has been having more pelvic pressure and bulge symptoms which is bothersome to her. No active protrusion or need to reduce prolapse. She is physically very active and feels that this is curtailing her ability to be active.      Urinary Symptoms/Voiding function  Says she is voiding a bit more frequency than usual and also has slower urine stream. She voids 8 times during the day and  "none at night. Does not splint to void. She denies any recurrent UTIs or gross hematuria. No bothersome stress or urgency incontinence.     Gastrointestinal Symptoms:  Denies chronic diarrhea, constipation. Denies bothersome fecal or flatal incontinence. Denies defecatory difficulties although has to sometimes lean forward to evacuate fully      Sexual function/Pelvic floor pain/GYN:   Sexually active, no pain with intercourse. Regular monthly cycles. Neg pap and HPV in 3/29/19      Relevant Medical History:    Diabetes? no  High Blood pressure? no     Recurrent UTIs? no  Sleep Apnea? no  Obesity? no  History of Blood clots? no  Other : See med hx      Pelvic Exam:   Normal external female genitalia. The urethra was normal.    Vagina: Stage 2 primarily posterior defect ( rectocele) with some apical and anterior components. No abnormal discharge  Uterus: Normal size, non tender. Normal appearing cervix  Ovaries: not palpable, no masses  Vulva: no lesion , some discoloration/irritation around anal opening. Pruritic.   Rectal: rectocele as above. No visible or palpable hemorrhoids    Pelvic floor strength: 2/5 kegels.    Pelvic floor muscles: No levator myalgia. Normal tone    POPQ EXAM FOR PROLAPSE SEVERITY  (Aa):   -1 (Ba):   -1 (standing) (C ):    -6   (GH):   4 (PB):  2 (TVL):  10   (Ap):   0 (Bp):  0 (D):   -8     ICS Stage (1-4):  2    Voiding trial:    VOID 600 ml  PVR 49 mL by Bladder ultrasound         Exam:   /77   Pulse 99   Ht 1.651 m (5' 5\")   Wt 56.7 kg (125 lb)   LMP 05/27/2023 (Exact Date)   BMI 20.80 kg/m      POPQ EXAM FOR PROLAPSE SEVERITY  (Aa):  +1 (Ba):   +3 (standing) (C ):    -4   (GH):   4 (PB):  2 (TVL):  10   (Ap):   0 (Bp):  0 (D):   -8     ICS Stage (1-4):  3            Assessment and plan  Germania was seen today for recheck.    Diagnoses and all orders for this visit:    Uterovaginal prolapse  -     US Pelvic Complete with Transvaginal; Future    Cystocele, " midline    Rectocele      Prolapse has progressed since I last saw her. She now has a more prominent cystocele ( stage 3). She is really bothered by her prolapse and wants to proceed with surgery. Pessary and pelvic PT has not helped in the past.     Today we discussed at length about the various options for her including robotic mesh augmented abdominal sacrocolpopexy vs vaginal native tissue procedures (  sacrospinous or uterosacral lig suspension with A&P colporrhaphies ). A vaginal caliber preserving native tissue vaginal repair such as SSLS or USLS procedures are generally less durable than mesh augmented abdominal minimally invasive repair like sacrocolpopexy but the later can be associated with a longer surgery and some mesh erosion risks.      We discussed the pros and cons of leaving the uterus vs removing the uterus. She is comfortable with supracervical hysterectomy and removal of tubes ( to reduce risk of future ovarian cancer). Given her young age, we will not remove her ovaries but discussed removal if we find something that is suspicious for possible ovarian cancer. We will obtain pelvic US to evaluate her ovaries given past hx of complex ovarian cyst and family hx of ovarian cancer.     She doesn't have any urinary incontinence. We discussed the possibility of stress leakage after prolapse repair when the bladder is back into its normal anatomical position. She is ok with postponing any incontinence procedure     Decision to  proceed with  Robotic assisted abdominal supracervical hysterectomy, bilateral salpingectomy, mesh augmented abdominal sacrocolpopexy, possible posterior repair and cystoscopy. Surgery request placed      I spent 45 face-to-face with Germania Hayden on the date of the encounter in chart review, patient visit, review of tests, documentation and/or discussion with other providers about the issues documented above.     Sincerely,    Amy Eubanks MD

## 2023-06-06 NOTE — TELEPHONE ENCOUNTER
Alexandra,    Patient asking for a new rx for wellbutrin 150 mg.  Pended old rx. Kamini MADRIGAL RN

## 2023-06-06 NOTE — PROGRESS NOTES
Ms Emerson is here to follow up for her known stage 2 uterovaginal prolapse with a rectocele and cystocele. I last saw her a new patient on 21 and at that time she wanted to try pelvic PT first. She wasn't interested in pessary as this has not helped in the past. Today she says that she had done pelvic PT for several months but has not noticed an improvement. She says her bulge symptoms have worsened and now she can see the bulge outside the vagina from time to time especially if she is doing physically exertive activities or straining to have a bowel movement. She is really bothered by this and says it is limiting her ability to be active.     Denies any stress or urgency incontinence. Has slow stream of urine but feels she empties her bladder and bowel ok. No fecal incontinence or defecatory issues.       This note was copied and pasted from Dr Eubanks on 21      CC: vaginal bulge    HPI:  Germania Hayden is a 41 year old  female who presents for evaluation of pelvic organ prolapse.   She has a known uterovaginal prolapse which was evaluated by her OBGYN at [facility] a couple of years ago and at that time, she was not too symptomatic and had resorted to expectant management. Today, she says she has been having more pelvic pressure and bulge symptoms which is bothersome to her. No active protrusion or need to reduce prolapse. She is physically very active and feels that this is curtailing her ability to be active.      Urinary Symptoms/Voiding function  Says she is voiding a bit more frequency than usual and also has slower urine stream. She voids 8 times during the day and none at night. Does not splint to void. She denies any recurrent UTIs or gross hematuria. No bothersome stress or urgency incontinence.     Gastrointestinal Symptoms:  Denies chronic diarrhea, constipation. Denies bothersome fecal or flatal incontinence. Denies defecatory difficulties although has to sometimes lean forward  "to evacuate fully      Sexual function/Pelvic floor pain/GYN:   Sexually active, no pain with intercourse. Regular monthly cycles. Neg pap and HPV in 3/29/19      Relevant Medical History:    Diabetes? no  High Blood pressure? no     Recurrent UTIs? no  Sleep Apnea? no  Obesity? no  History of Blood clots? no  Other : See med hx      Pelvic Exam:   Normal external female genitalia. The urethra was normal.    Vagina: Stage 2 primarily posterior defect ( rectocele) with some apical and anterior components. No abnormal discharge  Uterus: Normal size, non tender. Normal appearing cervix  Ovaries: not palpable, no masses  Vulva: no lesion , some discoloration/irritation around anal opening. Pruritic.   Rectal: rectocele as above. No visible or palpable hemorrhoids    Pelvic floor strength: 2/5 kegels.    Pelvic floor muscles: No levator myalgia. Normal tone    POPQ EXAM FOR PROLAPSE SEVERITY  (Aa):   -1 (Ba):   -1 (standing) (C ):    -6   (GH):   4 (PB):  2 (TVL):  10   (Ap):   0 (Bp):  0 (D):   -8     ICS Stage (1-4):  2    Voiding trial:    VOID 600 ml  PVR 49 mL by Bladder ultrasound         Exam:   /77   Pulse 99   Ht 1.651 m (5' 5\")   Wt 56.7 kg (125 lb)   LMP 05/27/2023 (Exact Date)   BMI 20.80 kg/m      POPQ EXAM FOR PROLAPSE SEVERITY  (Aa):  +1 (Ba):   +3 (standing) (C ):    -4   (GH):   4 (PB):  2 (TVL):  10   (Ap):   0 (Bp):  0 (D):   -8     ICS Stage (1-4):  3            Assessment and plan  Germania was seen today for recheck.    Diagnoses and all orders for this visit:    Uterovaginal prolapse  -     US Pelvic Complete with Transvaginal; Future    Cystocele, midline    Rectocele      Prolapse has progressed since I last saw her. She now has a more prominent cystocele ( stage 3). She is really bothered by her prolapse and wants to proceed with surgery. Pessary and pelvic PT has not helped in the past.     Today we discussed at length about the various options for her including robotic mesh augmented " abdominal sacrocolpopexy vs vaginal native tissue procedures (  sacrospinous or uterosacral lig suspension with A&P colporrhaphies ). A vaginal caliber preserving native tissue vaginal repair such as SSLS or USLS procedures are generally less durable than mesh augmented abdominal minimally invasive repair like sacrocolpopexy but the later can be associated with a longer surgery and some mesh erosion risks.      We discussed the pros and cons of leaving the uterus vs removing the uterus. She is comfortable with supracervical hysterectomy and removal of tubes ( to reduce risk of future ovarian cancer). Given her young age, we will not remove her ovaries but discussed removal if we find something that is suspicious for possible ovarian cancer. We will obtain pelvic US to evaluate her ovaries given past hx of complex ovarian cyst and family hx of ovarian cancer.     She doesn't have any urinary incontinence. We discussed the possibility of stress leakage after prolapse repair when the bladder is back into its normal anatomical position. She is ok with postponing any incontinence procedure     Decision to  proceed with  Robotic assisted abdominal supracervical hysterectomy, bilateral salpingectomy, mesh augmented abdominal sacrocolpopexy, possible posterior repair and cystoscopy. Surgery request placed      I spent 45 face-to-face with Germania Hayden on the date of the encounter in chart review, patient visit, review of tests, documentation and/or discussion with other providers about the issues documented above.

## 2023-06-06 NOTE — PATIENT INSTRUCTIONS
Thank you for trusting us with your care!     If you need to contact us for questions about:  Symptoms, Scheduling & Medical Questions; Non-urgent (2-3 day response) Fabien message, Urgent (needing response today) [phone] (if after 3:30pm next day response)   Prescriptions: Please call your Pharmacy   Billing: Marci [phone] or [facility]:[phone]

## 2023-06-08 RX ORDER — BUPROPION HYDROCHLORIDE 150 MG/1
300 TABLET ORAL EVERY MORNING
Qty: 180 TABLET | Refills: 1 | Status: SHIPPED | OUTPATIENT
Start: 2023-06-08 | End: 2023-06-12

## 2023-06-08 NOTE — TELEPHONE ENCOUNTER
Pt calling back for Wellbutrin 150 mg tab one daily as will be out of medication,  Forwarded to provider high priority.  LUIS Delcid RN

## 2023-06-12 ENCOUNTER — TELEPHONE (OUTPATIENT)
Dept: FAMILY MEDICINE | Facility: CLINIC | Age: 43
End: 2023-06-12
Payer: COMMERCIAL

## 2023-06-12 RX ORDER — BUPROPION HYDROCHLORIDE 150 MG/1
150 TABLET ORAL EVERY MORNING
Qty: 90 TABLET | Refills: 1 | Status: SHIPPED | OUTPATIENT
Start: 2023-06-12 | End: 2024-03-22

## 2023-06-12 NOTE — TELEPHONE ENCOUNTER
Prior Authorization Retail Medication Request    Medication/Dose: Bupropion hcl er xl  ICD code (if different than what is on RX):    Previously Tried and Failed:  Celexa; lexapro;  Rationale:  Patient has used since 2020    Insurance Name:  CLEMENTE BAZZI  Insurance ID:  Not provided  [facility] Key: [id number]      Pharmacy Information (if different than what is on RX)  Name:    Phone:

## 2023-06-16 NOTE — TELEPHONE ENCOUNTER
Prior Authorization Not Needed per Insurance    Medication: Bupropion hcl er xl  Insurance Company: [employer] [place] - Phone [phone] Fax [phone]  Expected CoPay:      Pharmacy Filling the Rx: [facility] [address] [place] [address] - [address]  Pharmacy Notified: Yes  Patient Notified: Yes  **Instructed pharmacy to notify patient when script is ready to /ship.**

## 2023-06-23 ENCOUNTER — TELEPHONE (OUTPATIENT)
Dept: UROLOGY | Facility: CLINIC | Age: 43
End: 2023-06-23
Payer: COMMERCIAL

## 2023-06-23 ENCOUNTER — TELEPHONE (OUTPATIENT)
Dept: OBGYN | Facility: CLINIC | Age: 43
End: 2023-06-23
Payer: COMMERCIAL

## 2023-06-23 NOTE — TELEPHONE ENCOUNTER
[facility] Call Center    Phone Message    May a detailed message be left on voicemail: yes     Reason for Call: Other: ,  Pt returning call to schedule surgery with Cha. Please call pt     Action Taken: Message routed to:  Other: [facility]    Travel Screening: Not Applicable

## 2023-06-23 NOTE — TELEPHONE ENCOUNTER
Patient returned call to clinic. Discussed surgery dates. Per patient 10/19 will not work for her. Will proceed with scheduling surgery for next available day of 11/2 at [facility].

## 2023-06-23 NOTE — TELEPHONE ENCOUNTER
Returned patient call. Left Voicemail to call clinic to discuss surgery. Also informed patient I will attempt to reach out via [employer] as well.

## 2023-06-27 ENCOUNTER — TELEPHONE (OUTPATIENT)
Dept: OBGYN | Facility: CLINIC | Age: 43
End: 2023-06-27
Payer: COMMERCIAL

## 2023-06-27 NOTE — TELEPHONE ENCOUNTER
Spoke with patient, scheduled surgery. Patient is aware of date, time, location, prep instructions, need for H&P within 30 days.    Type of surgery:HYSTERECTOMY, SUPRACERVICAL, ROBOT-ASSISTED, LAPAROSCOPIC, Bilateral salpingectomy       Location of surgery: [facility]  Date and time of surgery: 11/2/2023 at 1200  Surgeon: Dr Eubanks  Pre-Op H&P Date: patient will call and schedule with PCP  Post-Op Appt Date: Patient will call and schedule when she has her calendar   Packet sent out: Yes

## 2023-07-31 DIAGNOSIS — Z86.19 H/O COLD SORES: ICD-10-CM

## 2023-08-01 NOTE — TELEPHONE ENCOUNTER
Pending Prescriptions:                       Disp   Refills    acyclovir (ZOVIRAX) 200 MG capsule [Pharma*180 ca*0        Sig: Take 1 capsule by mouth twice daily    Routing refill request to provider for review/approval because:  Labs not current:  creatinine    Creatinine   Date Value Ref Range Status   05/30/2022 0.73 0.52 - 1.04 mg/dL Final   08/19/2019 0.60 0.52 - 1.04 mg/dL Final     Cierra Ayala RN  [facility]

## 2023-08-03 DIAGNOSIS — Z86.19 H/O COLD SORES: ICD-10-CM

## 2023-08-03 RX ORDER — ACYCLOVIR 200 MG/1
200 CAPSULE ORAL 2 TIMES DAILY
Qty: 180 CAPSULE | Refills: 0 | Status: CANCELLED | OUTPATIENT
Start: 2023-08-03

## 2023-08-03 RX ORDER — ACYCLOVIR 200 MG/1
CAPSULE ORAL
Qty: 180 CAPSULE | Refills: 0 | Status: SHIPPED | OUTPATIENT
Start: 2023-08-03 | End: 2023-10-24

## 2023-08-03 NOTE — TELEPHONE ENCOUNTER
Pending Prescriptions:                       Disp   Refills    acyclovir (ZOVIRAX) 200 MG capsule        180 ca*0            Sig: Take 1 capsule (200 mg) by mouth 2 times daily

## 2023-08-07 ENCOUNTER — TELEPHONE (OUTPATIENT)
Dept: FAMILY MEDICINE | Facility: CLINIC | Age: 43
End: 2023-08-07
Payer: COMMERCIAL

## 2023-08-07 NOTE — TELEPHONE ENCOUNTER
Medication Question or Refill    Contacts         Type Contact Phone/Fax    08/07/2023 10:25 AM CDT Phone (Incoming) Germania Hayden (Self) [phone] (H)     Following up on medication refill request - haven't heard anything over a week and currently out of medication            What medication are you calling about (include dose and sig)?: acyclovir - 200MG    Preferred Pharmacy:   [facility] [address] [place] [address] [address]   [address] [facility] [address]  Phone: [phone] Fax: [phone]      Controlled Substance Agreement on file:   CSA -- Patient Level:    CSA: None found at the patient level.       Who prescribed the medication?: Alexandra Hart    Do you need a refill? Yes    When did you use the medication last? 8/6/23 - currently out    Patient offered an appointment? No    Do you have any questions or concerns?  Yes: currently out and would like to know what labs are needed to be done for the refill (if needed)      Could we send this information to you in [facility] or would you prefer to receive a phone call?:   Patient would prefer a phone call   Okay to leave a detailed message?: Yes at Cell number on file:    Telephone Information:   Mobile [phone]

## 2023-09-04 ENCOUNTER — MYC MEDICAL ADVICE (OUTPATIENT)
Dept: FAMILY MEDICINE | Facility: CLINIC | Age: 43
End: 2023-09-04
Payer: COMMERCIAL

## 2023-09-07 ENCOUNTER — OFFICE VISIT (OUTPATIENT)
Dept: DERMATOLOGY | Facility: CLINIC | Age: 43
End: 2023-09-07
Payer: COMMERCIAL

## 2023-09-07 DIAGNOSIS — D48.5 NEOPLASM OF UNCERTAIN BEHAVIOR OF SKIN: ICD-10-CM

## 2023-09-07 PROCEDURE — 99213 OFFICE O/P EST LOW 20 MIN: CPT | Mod: 25 | Performed by: PHYSICIAN ASSISTANT

## 2023-09-07 PROCEDURE — 11102 TANGNTL BX SKIN SINGLE LES: CPT | Performed by: PHYSICIAN ASSISTANT

## 2023-09-07 PROCEDURE — 88305 TISSUE EXAM BY PATHOLOGIST: CPT | Performed by: DERMATOLOGY

## 2023-09-07 PROCEDURE — 88342 IMHCHEM/IMCYTCHM 1ST ANTB: CPT | Performed by: DERMATOLOGY

## 2023-09-07 ASSESSMENT — PATIENT HEALTH QUESTIONNAIRE - PHQ9
SUM OF ALL RESPONSES TO PHQ QUESTIONS 1-9: 6
SUM OF ALL RESPONSES TO PHQ QUESTIONS 1-9: 6

## 2023-09-07 NOTE — PATIENT INSTRUCTIONS
Proper skin care from [facility]:    -Eliminate harsh soaps as they strip the natural oils from the skin, often resulting in dry itchy skin ( i.e. Dial, Zest, Salima Spring)  -Use mild soaps such as Cetaphil or Dove Sensitive Skin in the shower. You do not need to use soap on arms, legs, and trunk every time you shower unless visibly soiled.   -Avoid hot or cold showers.  -After showering, lightly dry off and apply moisturizer within 2-3 minutes. This will help trap moisture in the skin.   -Aggressive use of a moisturizer at least 1-2 times a day to the entire body (including -Vanicream, Cetaphil, Aquaphor or Cerave) and moisturize hands after every washing.  -We recommend using moisturizers that come in a tub that needs to be scooped out, not a pump. This has more of an oil base. It will hold moisture in your skin much better than a water base moisturizer. The above recommended are non-pore clogging.      Wear a sunscreen with at least SPF 30 on your face, ears, neck and V of the chest daily. Wear sunscreen on other areas of the body if those areas are exposed to the sun throughout the day. Sunscreens can contain physical and/or chemical blockers. Physical blockers are less likely to clog pores, these include zinc oxide and titanium dioxide. Reapply every two hour and after swimming.     Sunscreen examples: https://www.ewg.org/sunscreen/    UV radiation  UVA radiation remains constant throughout the day and throughout the year. It is a longer wavelength than UVB and therefore penetrates deeper into the skin leading to immediate and delayed tanning, photoaging, and skin cancer. 70-80% of UVA and UVB radiation occurs between the hours of 10am-2pm.  UVB radiation  UVB radiation causes the most harmful effects and is more significant during the summer months. However, snow and ice can reflect UVB radiation leading to skin damage during the winter months as well. UVB radiation is responsible for tanning,  burning, inflammation, delayed erythema (pinkness), pigmentation (brown spots), and skin cancer.     I recommend self monthly full body exams and yearly full body exams with a dermatology provider. If you develop a new or changing lesion please follow up for examination. Most skin cancers are pink and scaly or pink and pearly. However, we do see blue/brown/black skin cancers.  Consider the ABCDEs of melanoma when giving yourself your monthly full body exam ( don't forget the groin, buttocks, feet, toes, etc). A-asymmetry, B-borders, C-color, D-diameter, E-elevation or evolving. If you see any of these changes please follow up in clinic. If you cannot see your back I recommend purchasing a hand held mirror to use with a larger wall mirror.

## 2023-09-07 NOTE — LETTER
9/7/2023         RE: Germania Hayden  [address]  [place] [address]        Dear Colleague,    Thank you for referring your patient, Germania Hayden, to the [facility]. Please see a copy of my visit note below.    Germania Hayden is an extremely pleasant 43 year old year old female patient here today for small spot on left side of temple. She notes present for over a years. She has being using tretinoin without results but has been inconsistent with usage. She also notes mole on right cheek that has become more raised over time.   Patient has no other skin complaints today.  Remainder of the HPI, Meds, PMH, Allergies, FH, and SH was reviewed in chart.    Pertinent Hx:   No personal history of skin cancer.   Past Medical History:   Diagnosis Date     ASCUS favor benign 12/2011    neg HPV, repeat Pap + HPV cotesting in 3 years     Chickenpox      Depression      Eating disorder     bulemia- age 18 to 32     Spider veins 5-6 years ago     Uterovaginal prolapse 10/27/2017       Past Surgical History:   Procedure Laterality Date     ARTHROSCOPIC RECONSTRUCTION ANTERIOR CRUCIATE LIGAMENT  x3    twice on L knee and once on Rt knee     ESOPHAGOSCOPY, GASTROSCOPY, DUODENOSCOPY (EGD), COMBINED N/A 10/10/2019    Procedure: ESOPHAGOGASTRODUODENOSCOPY, WITH BIOPSY;  Surgeon: Shirlene Wilson MD;  Location: [facility] OR     MAMMOPLASTY AUGMENTATION Bilateral 2015     wisdom teeth       ZZC BREAST AUGMENTATION          Family History   Problem Relation Age of Onset     Depression Mother      Colon Polyps Mother      Melanoma Sister      Mental Illness Brother         schizoaffective disorder     Gastrointestinal Disease Brother         ulcerative colitis     Cancer Maternal Grandmother         lymphoma     Cancer Maternal Grandfather         lung ,bone     Cancer Paternal Grandmother 60        ovarian     Breast Cancer Paternal Grandmother 85     Cardiovascular Paternal  Grandfather         MI       Social History     Socioeconomic History     Marital status:      Spouse name: Eris     Number of children: 2     Years of education: Not on file     Highest education level: Not on file   Occupational History     Employer: [employer]   Tobacco Use     Smoking status: Never     Smokeless tobacco: Never   Vaping Use     Vaping Use: Never used   Substance and Sexual Activity     Alcohol use: Yes     Comment: rare     Drug use: No     Sexual activity: Yes     Partners: Male   Other Topics Concern     Parent/sibling w/ CABG, MI or angioplasty before 65F 55M? No   Social History Narrative     Not on file     Social Determinants of Health     Financial Resource Strain: Not on file   Food Insecurity: Not on file   Transportation Needs: Not on file   Physical Activity: Not on file   Stress: Not on file   Social Connections: Not on file   Intimate Partner Violence: Not on file   Housing Stability: Not on file       Outpatient Encounter Medications as of 9/7/2023   Medication Sig Dispense Refill     acyclovir (ZOVIRAX) 200 MG capsule Take 1 capsule by mouth twice daily 180 capsule 0     buPROPion (WELLBUTRIN XL) 150 MG 24 hr tablet Take 1 tablet (150 mg) by mouth every morning 90 tablet 1     hydrOXYzine (ATARAX) 10 MG tablet Take 1 tablet (10 mg) by mouth every 8 hours as needed for anxiety 30 tablet 1     Multiple Vitamins-Minerals (MULTIVITAMIN PO) Take by mouth daily       tretinoin (RETIN-A) 0.025 % external cream Use a pea sized amount every other night to nightly as tolerated 45 g 0     No facility-administered encounter medications on file as of 9/7/2023.             O:   NAD, WDWN, Alert & Oriented, Mood & Affect wnl, Vitals stable   Here today alone   There were no vitals taken for this visit.   General appearance normal   Vitals stable   Alert, oriented and in no acute distress      0.4 cm brown irregular macule on left lateral thigh   2-3 Skin small colored  papules on left temple   Stuck on papules and brown macules on trunk and ext   Red papules on trunk  Brown papules and macules with regular pigment network and borders on torso and extremities       Eyes: Conjunctivae/lids:Normal     ENT: Lips: normal    MSK:Normal    Cardiovascular: peripheral edema none    Pulm: Breathing Normal    Neuro/Psych: Orientation:Alert and Orientedx3 ; Mood/Affect:normal     A/P:  1. R/O atypical nevus on left lateral thigh  TANGENTIAL BIOPSY SENT OUT:  After consent, anesthesia with LEC and prep, tangential excision performed and specimen sent out for permanent section histology.  No complications and routine wound care. Patient told to call our office in 1-2 weeks for result.      2. Seborrheic keratosis, lentigo, angioma, benign nevi, eic on left temple   Patient does not like mole growing on right side of face, can consult with Dr. Etienne for excision.   It was a pleasure speaking to Germania Hayden today.  BENIGN LESIONS DISCUSSED WITH PATIENT:  I discussed the specifics of tumor, prognosis, and genetics of benign lesions.  I explained that treatment of these lesions would be purely cosmetic and not medically neccessary.  I discussed with patient different removal options including excision, cautery and /or laser.      Nature and genetics of benign skin lesions dicussed with patient.  Signs and Symptoms of skin cancer discussed with patient.  ABCDEs of melanoma reviewed with patient.  Patient encouraged to perform monthly skin exams.  UV precautions reviewed with patient.  Risks of non-melanoma skin cancer discussed with patient   Return to clinic pending biopsy results.       Again, thank you for allowing me to participate in the care of your patient.        Sincerely,        Paulina Beckett PA-C

## 2023-09-08 ENCOUNTER — VIRTUAL VISIT (OUTPATIENT)
Dept: FAMILY MEDICINE | Facility: CLINIC | Age: 43
End: 2023-09-08
Payer: COMMERCIAL

## 2023-09-08 DIAGNOSIS — Z87.898 HISTORY OF MOTION SICKNESS: Primary | ICD-10-CM

## 2023-09-08 PROCEDURE — 99213 OFFICE O/P EST LOW 20 MIN: CPT | Mod: 93 | Performed by: NURSE PRACTITIONER

## 2023-09-08 RX ORDER — SCOLOPAMINE TRANSDERMAL SYSTEM 1 MG/1
1 PATCH, EXTENDED RELEASE TRANSDERMAL
Qty: 3 PATCH | Refills: 0 | Status: SHIPPED | OUTPATIENT
Start: 2023-09-08 | End: 2023-10-06

## 2023-09-08 NOTE — PATIENT INSTRUCTIONS
Pt request medication for scopolamine patch. They will require treatment for 7 days. They haveused the patch before. The patient was advised to follow the instructions on use carefully and advised to wash hands after applying.     Discussed risks and usage.    Follow-up as problems arise.    Alexandra Hart, DNP

## 2023-09-08 NOTE — PROGRESS NOTES
Germania is a 43 year old who is being evaluated via a billable telephone visit.      What phone number would you like to be contacted at? [phone]   How would you like to obtain your AVS? Arnaldo    Distant Location (provider location):  On-site    Assessment & Plan   Problem List Items Addressed This Visit       History of motion sickness - Primary    Relevant Medications    scopolamine (TRANSDERM) 1 MG/3DAYS 72 hr patch       The risks, benefits and treatment options of prescribed medications or other treatments have been discussed with the patient. The patient verbalized their understanding and should call or follow up if no improvement or if they develop further problems.         See Patient Instructions    Alexandra Hart [facility]    Davi Solo is a 43 year old, presenting for the following health issues:  Medication Request        9/8/2023     1:00 PM   Additional Questions   Roomed by Theresa CLARKE   Accompanied by self       History of Present Illness       Reason for visit:  Sea sickness and upcoming cruise    She eats 2-3 servings of fruits and vegetables daily.She consumes 0 sweetened beverage(s) daily.She exercises with enough effort to increase her heart rate 20 to 29 minutes per day.  She exercises with enough effort to increase her heart rate 4 days per week.   She is taking medications regularly.       Would like to discuss a medication for sea sickness as she has an upcoming cruise.    Will be traveling for 7 days.      Review of Systems   Constitutional, HEENT, cardiovascular, pulmonary, GI, , musculoskeletal, neuro, skin, endocrine and psych systems are negative, except as otherwise noted.      Objective           Vitals:  No vitals were obtained today due to virtual visit.    Physical Exam   healthy, alert, and no distress  PSYCH: Alert and oriented times 3; coherent speech, normal   rate and volume, able to articulate logical thoughts, able   to  abstract reason, no tangential thoughts, no hallucinations   or delusions  Her affect is normal and pleasant  RESP: No cough, no audible wheezing, able to talk in full sentences  Remainder of exam unable to be completed due to telephone visits           Phone call duration: 7 minutes

## 2023-09-11 NOTE — PROGRESS NOTES
Germania Hayden is an extremely pleasant 43 year old year old female patient here today for small spot on left side of temple. She notes present for over a years. She has being using tretinoin without results but has been inconsistent with usage. She also notes mole on right cheek that has become more raised over time.   Patient has no other skin complaints today.  Remainder of the HPI, Meds, PMH, Allergies, FH, and SH was reviewed in chart.    Pertinent Hx:   No personal history of skin cancer.   Past Medical History:   Diagnosis Date    ASCUS favor benign 12/2011    neg HPV, repeat Pap + HPV cotesting in 3 years    Chickenpox     Depression     Eating disorder     bulemia- age 18 to 32    Spider veins 5-6 years ago    Uterovaginal prolapse 10/27/2017       Past Surgical History:   Procedure Laterality Date    ARTHROSCOPIC RECONSTRUCTION ANTERIOR CRUCIATE LIGAMENT  x3    twice on L knee and once on Rt knee    ESOPHAGOSCOPY, GASTROSCOPY, DUODENOSCOPY (EGD), COMBINED N/A 10/10/2019    Procedure: ESOPHAGOGASTRODUODENOSCOPY, WITH BIOPSY;  Surgeon: Shirlene Wilson MD;  Location: [facility]    MAMMOPLASTY AUGMENTATION Bilateral 2015    wisdom teeth      ZZC BREAST AUGMENTATION          Family History   Problem Relation Age of Onset    Depression Mother     Colon Polyps Mother     Melanoma Sister     Mental Illness Brother         schizoaffective disorder    Gastrointestinal Disease Brother         ulcerative colitis    Cancer Maternal Grandmother         lymphoma    Cancer Maternal Grandfather         lung ,bone    Cancer Paternal Grandmother 60        ovarian    Breast Cancer Paternal Grandmother 85    Cardiovascular Paternal Grandfather         MI       Social History     Socioeconomic History    Marital status:      Spouse name: Eris    Number of children: 2    Years of education: Not on file    Highest education level: Not on file   Occupational History     Employer: [employer]    Tobacco Use    Smoking status: Never    Smokeless tobacco: Never   Vaping Use    Vaping Use: Never used   Substance and Sexual Activity    Alcohol use: Yes     Comment: rare    Drug use: No    Sexual activity: Yes     Partners: Male   Other Topics Concern    Parent/sibling w/ CABG, MI or angioplasty before 65F 55M? No   Social History Narrative    Not on file     Social Determinants of Health     Financial Resource Strain: Not on file   Food Insecurity: Not on file   Transportation Needs: Not on file   Physical Activity: Not on file   Stress: Not on file   Social Connections: Not on file   Intimate Partner Violence: Not on file   Housing Stability: Not on file       Outpatient Encounter Medications as of 9/7/2023   Medication Sig Dispense Refill    acyclovir (ZOVIRAX) 200 MG capsule Take 1 capsule by mouth twice daily 180 capsule 0    buPROPion (WELLBUTRIN XL) 150 MG 24 hr tablet Take 1 tablet (150 mg) by mouth every morning 90 tablet 1    hydrOXYzine (ATARAX) 10 MG tablet Take 1 tablet (10 mg) by mouth every 8 hours as needed for anxiety 30 tablet 1    Multiple Vitamins-Minerals (MULTIVITAMIN PO) Take by mouth daily      tretinoin (RETIN-A) 0.025 % external cream Use a pea sized amount every other night to nightly as tolerated 45 g 0     No facility-administered encounter medications on file as of 9/7/2023.             O:   NAD, WDWN, Alert & Oriented, Mood & Affect wnl, Vitals stable   Here today alone   There were no vitals taken for this visit.   General appearance normal   Vitals stable   Alert, oriented and in no acute distress      0.4 cm brown irregular macule on left lateral thigh   2-3 Skin small colored papules on left temple   Stuck on papules and brown macules on trunk and ext   Red papules on trunk  Brown papules and macules with regular pigment network and borders on torso and extremities       Eyes: Conjunctivae/lids:Normal     ENT: Lips: normal    MSK:Normal    Cardiovascular: peripheral edema  none    Pulm: Breathing Normal    Neuro/Psych: Orientation:Alert and Orientedx3 ; Mood/Affect:normal     A/P:  1. R/O atypical nevus on left lateral thigh  TANGENTIAL BIOPSY SENT OUT:  After consent, anesthesia with LEC and prep, tangential excision performed and specimen sent out for permanent section histology.  No complications and routine wound care. Patient told to call our office in 1-2 weeks for result.      2. Seborrheic keratosis, lentigo, angioma, benign nevi, eic on left temple   Patient does not like mole growing on right side of face, can consult with Dr. Etienen for excision.   It was a pleasure speaking to Germania Hayden today.  BENIGN LESIONS DISCUSSED WITH PATIENT:  I discussed the specifics of tumor, prognosis, and genetics of benign lesions.  I explained that treatment of these lesions would be purely cosmetic and not medically neccessary.  I discussed with patient different removal options including excision, cautery and /or laser.      Nature and genetics of benign skin lesions dicussed with patient.  Signs and Symptoms of skin cancer discussed with patient.  ABCDEs of melanoma reviewed with patient.  Patient encouraged to perform monthly skin exams.  UV precautions reviewed with patient.  Risks of non-melanoma skin cancer discussed with patient   Return to clinic pending biopsy results.

## 2023-09-14 ENCOUNTER — TELEPHONE (OUTPATIENT)
Dept: DERMATOLOGY | Facility: CLINIC | Age: 43
End: 2023-09-14
Payer: COMMERCIAL

## 2023-09-14 NOTE — TELEPHONE ENCOUNTER
[facility] Call Center    Phone Message    May a detailed message be left on voicemail: yes     Reason for Call: Other: Pt is to go on a cruise a week after her procedure wanting to ensure that was okay. Please  call pt to discuss. Thank you!    Action Taken: Other: Derm    Travel Screening: Not Applicable

## 2023-09-15 ENCOUNTER — HOSPITAL ENCOUNTER (OUTPATIENT)
Dept: ULTRASOUND IMAGING | Facility: CLINIC | Age: 43
Discharge: HOME | End: 2023-09-15
Attending: OBSTETRICS & GYNECOLOGY | Admitting: OBSTETRICS & GYNECOLOGY
Payer: COMMERCIAL

## 2023-09-15 ENCOUNTER — TELEPHONE (OUTPATIENT)
Dept: DERMATOLOGY | Facility: CLINIC | Age: 43
End: 2023-09-15
Payer: COMMERCIAL

## 2023-09-15 DIAGNOSIS — N81.4 UTEROVAGINAL PROLAPSE: ICD-10-CM

## 2023-09-15 LAB
PATH REPORT.COMMENTS IMP SPEC: NORMAL
PATH REPORT.FINAL DX SPEC: NORMAL
PATH REPORT.GROSS SPEC: NORMAL
PATH REPORT.MICROSCOPIC SPEC OTHER STN: NORMAL
PATH REPORT.RELEVANT HX SPEC: NORMAL

## 2023-09-15 PROCEDURE — 76830 TRANSVAGINAL US NON-OB: CPT

## 2023-09-15 NOTE — TELEPHONE ENCOUNTER
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 Etienne for further excision.

## 2023-09-15 NOTE — TELEPHONE ENCOUNTER
Writer called patient and gave her the message.  Patient stated understanding and said that she would call the dermatology clinic and get her appt scheduled.    Dirk BLACKWOOD [facility]

## 2023-09-15 NOTE — TELEPHONE ENCOUNTER
Left message on identified voicemail that pt should wait to have procedure done if she is planning on taking a cruise one week post procedure. Advised that she would not be able to swim or do water activities on vacation and would have a bandage on. Advised to call if she had questions.  Nusrat ARIAS RN BSN PHN  Specialty Clinics

## 2023-09-15 NOTE — TELEPHONE ENCOUNTER
[facility] Call Center    Phone Message    May a detailed message be left on voicemail: yes     Reason for Call: Other: Pt states she is scheduled for a procedure on 10/2 but she was going to cancel it but hasn't yet. Pt states she got results back today and needs a different procedure - see TE dated today, 9/15/23.     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     If the visit needs to be sooner she would like a call back. If the 10/2 visit can just be changed to this reason, Pt states she doesn't need a call back.     Thank you.     Action Taken: Other: [place] [facility]    Travel Screening: Not Applicable

## 2023-09-18 ENCOUNTER — TELEPHONE (OUTPATIENT)
Dept: OBGYN | Facility: CLINIC | Age: 43
End: 2023-09-18
Payer: COMMERCIAL

## 2023-09-18 NOTE — TELEPHONE ENCOUNTER
Patient called and left a message stating that she is having a hysterectomy with Dr. Eubanks on 11-2-23 and had an US done 9-15-23 and would like to go over the results.

## 2023-09-20 NOTE — TELEPHONE ENCOUNTER
let patient know that her US shows some ovarian cyst but likely nothing that will require further follow up. She should be scheduled for presurgical counseling with me before her surgery ( We do this for every surgical patient 1-2 weeks prior to their surgery ). Her surgery is scheduled for Nov but I don't see this appointment has been made. Can you guys set up a virtual appointment with me for this and also to discuss the findings of the US some more.    Thank you    Amy

## 2023-10-02 ENCOUNTER — OFFICE VISIT (OUTPATIENT)
Dept: DERMATOLOGY | Facility: CLINIC | Age: 43
End: 2023-10-02
Payer: COMMERCIAL

## 2023-10-02 DIAGNOSIS — D23.9 DERMATOFIBROMA: ICD-10-CM

## 2023-10-02 DIAGNOSIS — L82.1 SEBORRHEIC KERATOSES: ICD-10-CM

## 2023-10-02 DIAGNOSIS — D48.5 NEOPLASM OF UNCERTAIN BEHAVIOR OF SKIN: Primary | ICD-10-CM

## 2023-10-02 DIAGNOSIS — L81.4 LENTIGO: ICD-10-CM

## 2023-10-02 PROCEDURE — 88342 IMHCHEM/IMCYTCHM 1ST ANTB: CPT | Performed by: DERMATOLOGY

## 2023-10-02 PROCEDURE — 99213 OFFICE O/P EST LOW 20 MIN: CPT | Mod: 25 | Performed by: DERMATOLOGY

## 2023-10-02 PROCEDURE — 11402 EXC TR-EXT B9+MARG 1.1-2 CM: CPT | Performed by: DERMATOLOGY

## 2023-10-02 PROCEDURE — 88331 PATH CONSLTJ SURG 1 BLK 1SPC: CPT | Performed by: DERMATOLOGY

## 2023-10-02 ASSESSMENT — PAIN SCALES - GENERAL: PAINLEVEL: NO PAIN (0)

## 2023-10-02 NOTE — PATIENT INSTRUCTIONS
Open Wound Care     for ____ Left Thigh __________      No strenuous activity for 48 hours    Take Tylenol as needed for discomfort.                                                .         Do not drink alcoholic beverages for 48 hours.    Keep the pressure bandage in place for 24 hours. If the bandage becomes blood tinged or loose, reinforce it with gauze and tape.        (Refer to the reverse side of this page for management of bleeding).    Remove bandage in 24 hours and begin wound care as follows:     Clean area with tap water using a Q tip or gauze pad, (shower / bathe normally)  Dry wound with Q tip or gauze pad  Apply Aquaphor, Vaseline, Polysporin or Bacitracin Ointment with a Q tip  Do NOT use Neosporin Ointment *  Cover the wound with a band-aid or nonstick gauze pad and paper tape.  Repeat wound care once a day until wound is completely healed.    It is an old wives tale that a wound heals better when it is exposed to air and allowed to dry out. The wound will heal faster with a better cosmetic result if it is kept moist with ointment and covered with a bandage.  Do not let the wound dry out.      Supplies Needed:                Qtips or gauze pads                Polysporin or Bacitracin Ointment                Bandaids or nonstick gauze pads and paper tape    Wound care kits and brown paper tape are available for purchase at   the pharmacy.    BLEEDING:    Use tightly rolled up gauze or cloth to apply direct pressure over the bandage for 20   minutes.  Reapply pressure for an additional 20 minutes if necessary  Call the office or go to the nearest emergency room if pressure fails to stop the bleeding.  Use additional gauze and tape to maintain pressure once the bleeding has stopped.  Begin wound care 24 hours after surgery as directed.                  WOUND HEALING    One week after surgery a pink / red halo will form around the outside of the wound.   This is new skin.  The center of the wound will  appear yellowish white and produce some drainage.  The pink halo will slowly migrate in toward the center of the wound until the wound is covered with new shiny pink skin.  There will be no more drainage when the wound is completely healed.  It will take six months to one year for the redness to fade.  The scar may be itchy, tight and sensitive to extreme temperatures for a year after the surgery.  Massaging the area several times a day for several minutes after the wound is completely healed will help the scar soften and normalize faster. Begin massage only after healing is complete.    In case of emergency call: Dr Etienne: [phone]  [facility]: [phone]  [facility]:[phone]

## 2023-10-02 NOTE — LETTER
10/2/2023         RE: Germania Hayden  [address]  [place] [id number]        Dear Colleague,    Thank you for referring your patient, Germania Hayden, to the [facility]. Please see a copy of my visit note below.    Germania Hayden is an extremely pleasant 43 year old year old female patient here today for evaluation and managment of severe an on left lat thigh. Today she notes other spotson skin.   .  Patient has no other skin complaints today.  Remainder of the HPI, Meds, PMH, Allergies, FH, and SH was reviewed in chart.      Past Medical History:   Diagnosis Date     ASCUS favor benign 12/2011    neg HPV, repeat Pap + HPV cotesting in 3 years     Chickenpox      Depression      Eating disorder     bulemia- age 18 to 32     Spider veins 5-6 years ago     Uterovaginal prolapse 10/27/2017       Past Surgical History:   Procedure Laterality Date     ARTHROSCOPIC RECONSTRUCTION ANTERIOR CRUCIATE LIGAMENT  x3    twice on L knee and once on Rt knee     ESOPHAGOSCOPY, GASTROSCOPY, DUODENOSCOPY (EGD), COMBINED N/A 10/10/2019    Procedure: ESOPHAGOGASTRODUODENOSCOPY, WITH BIOPSY;  Surgeon: Shirlene Wilson MD;  Location: [facility] OR     MAMMOPLASTY AUGMENTATION Bilateral 2015     wisdom teeth       ZZC BREAST AUGMENTATION          Family History   Problem Relation Age of Onset     Depression Mother      Colon Polyps Mother      Melanoma Sister      Mental Illness Brother         schizoaffective disorder     Gastrointestinal Disease Brother         ulcerative colitis     Cancer Maternal Grandmother         lymphoma     Cancer Maternal Grandfather         lung ,bone     Cancer Paternal Grandmother 60        ovarian     Breast Cancer Paternal Grandmother 85     Cardiovascular Paternal Grandfather         MI       Social History     Socioeconomic History     Marital status:      Spouse name: Eris     Number of children: 2     Years of education: Not on file      Highest education level: Not on file   Occupational History     Employer: [employer]   Tobacco Use     Smoking status: Never     Smokeless tobacco: Never   Vaping Use     Vaping Use: Never used   Substance and Sexual Activity     Alcohol use: Yes     Comment: rare     Drug use: No     Sexual activity: Yes     Partners: Male   Other Topics Concern     Parent/sibling w/ CABG, MI or angioplasty before 65F 55M? No   Social History Narrative     Not on file     Social Determinants of Health     Financial Resource Strain: Low Risk  (9/29/2023)    Financial Resource Strain      Within the past 12 months, have you or your family members you live with been unable to get utilities (heat, electricity) when it was really needed?: No   Food Insecurity: Low Risk  (9/29/2023)    Food Insecurity      Within the past 12 months, did you worry that your food would run out before you got money to buy more?: No      Within the past 12 months, did the food you bought just not last and you didn t have money to get more?: No   Transportation Needs: Low Risk  (9/29/2023)    Transportation Needs      Within the past 12 months, has lack of transportation kept you from medical appointments, getting your medicines, non-medical meetings or appointments, work, or from getting things that you need?: No   Physical Activity: Not on file   Stress: Not on file   Social Connections: Not on file   Interpersonal Safety: Not on file   Housing Stability: Low Risk  (9/29/2023)    Housing Stability      Do you have housing? : Yes      Are you worried about losing your housing?: No       Outpatient Encounter Medications as of 10/2/2023   Medication Sig Dispense Refill     acyclovir (ZOVIRAX) 200 MG capsule Take 1 capsule by mouth twice daily 180 capsule 0     buPROPion (WELLBUTRIN XL) 150 MG 24 hr tablet Take 1 tablet (150 mg) by mouth every morning 90 tablet 1     hydrOXYzine (ATARAX) 10 MG tablet Take 1 tablet (10 mg) by mouth every 8  hours as needed for anxiety 30 tablet 1     Multiple Vitamins-Minerals (MULTIVITAMIN PO) Take by mouth daily       scopolamine (TRANSDERM) 1 MG/3DAYS 72 hr patch Place 1 patch onto the skin every 72 hours 3 patch 0     tretinoin (RETIN-A) 0.025 % external cream Use a pea sized amount every other night to nightly as tolerated 45 g 0     No facility-administered encounter medications on file as of 10/2/2023.             O:   NAD, WDWN, Alert & Oriented, Mood & Affect wnl, Vitals stable   Here today alone   General appearance normal   Vitals stable   Alert, oriented and in no acute distress   L lat thigh 4mm scaly papule   L calf firm papule  L hip stuck on papule   Stuck on papules and brown macules on trunk and ext     Eyes: Conjunctivae/lids:Normal     ENT: Lips, buccal mucosa, tongue: normal    MSK:Normal    Cardiovascular: peripheral edema none    Pulm: Breathing Normal    Neuro/Psych: Orientation:Alert and Orientedx3 ; Mood/Affect:normal       MICRO:   L lat thigh:Unremarkable epidermis, dermis and superficial subcutis.  No concerning areas for malignancy.     A/P:  Atypical nevus  Atypical moles are unusual benign moles that may resemble melanoma. People who have them are at increased risk of developing single or multiple melanomas. The higher the number of these moles someone has, the higher the risk; those who have 10 or more have 12 times the risk of developing melanoma compared to the general population. Atypical nevi are found significantly more often in melanoma patients than in the general population.    While atypical moles are considered to be pre-cancerous (more likely to turn into melanoma than regular moles), not everyone who has atypical moles gets melanoma. In fact, most moles -- both ordinary and atypical ones -- never become cancerous. Thus the removal of all atypical nevi is unnecessary. In fact, most of the melanomas found on people with atypical moles arise from normal skin and not an atypical  "mole.    Although a physician bases the initial diagnosis of atypical moles on a physical examination, removing several moles and examining them under a microscope must confirm the diagnosis. This procedure, is called a biopsy.    A pathologist will examine the tissue under a microscope and make the precise diagnosis. Diagnosis by biopsy is not exact, and in difficult cases doctors may split 50/50 down the middle as to whether a mole is melanoma or benign. If the pathologist uses the term \"severely dysplastic\" or \"atypical melanocytic hyperplasia\" or offers a long descriptive narrative it means he really is concerned about melanoma, but does not want to call it that.    Most dermatologists usually recommend that all patients with these severely dysplastic moles have them removed. Also many dermatologists recommend removing \"moderate dysplasia\" moles, if the biopsy didn't get all of it. Those with \"mild dysplasia\" can usually be left alone or watched.    2. Seborrheic keratosis, letnigo  3. Dermatofibroma  Excision discussed with patient   PROCEDURE NOTE    It was a pleasure speaking to Germania Hayden today.  Previous clinic notes and pertinent laboratory tests were reviewed prior to Germania Hayden's visit.  Signs and Symptoms of skin cancer discussed with patient.  Patient encouraged to perform monthly skin exams.  UV precautions reviewed with patient.  Return to clinic 6 months  L lat thigh severe an   EXCISION OF Atypical nevus, Margins confirmed with FROZEN SECTIONS, MART1 stain AND Second intent: After thorough discussion of PGACAC, consent obtained, anesthesia and prep, the margins of the lesion were identified and an incision was made encompassing the lesion with 4mm margin. The incisions were made through the skin and down to and including the superficial subcutis.  The lesion was removed en bloc and submitted for frozen section pathologic review. Clear margins obtained (1.2cm).  MART1 stain performed " one 1 section(s).    REPAIR SECOND INTENT: We discussed the options for wound management in full with the patient including risks/benefits/possible outcomes. Decision made to allow the wound to heal by second intention. EBL minimal; complications none; wound care routine.  The patient was discharged in good condition and will return in one month or prn for wound evaluation.       Again, thank you for allowing me to participate in the care of your patient.        Sincerely,        Eris Etienne MD

## 2023-10-02 NOTE — NURSING NOTE
Chief Complaint   Patient presents with    Derm Problem     Mohs- L lateral thigh        There were no vitals filed for this visit.  Wt Readings from Last 1 Encounters:   06/06/23 56.7 kg (125 lb)       Liane Aparicio LPN .................10/2/2023

## 2023-10-02 NOTE — PROGRESS NOTES
Germania Hayden is an extremely pleasant 43 year old year old female patient here today for evaluation and managment of severe an on left lat thigh. Today she notes other spotson skin.   .  Patient has no other skin complaints today.  Remainder of the HPI, Meds, PMH, Allergies, FH, and SH was reviewed in chart.      Past Medical History:   Diagnosis Date    ASCUS favor benign 12/2011    neg HPV, repeat Pap + HPV cotesting in 3 years    Chickenpox     Depression     Eating disorder     bulemia- age 18 to 32    Spider veins 5-6 years ago    Uterovaginal prolapse 10/27/2017       Past Surgical History:   Procedure Laterality Date    ARTHROSCOPIC RECONSTRUCTION ANTERIOR CRUCIATE LIGAMENT  x3    twice on L knee and once on Rt knee    ESOPHAGOSCOPY, GASTROSCOPY, DUODENOSCOPY (EGD), COMBINED N/A 10/10/2019    Procedure: ESOPHAGOGASTRODUODENOSCOPY, WITH BIOPSY;  Surgeon: Shirlene Wilson MD;  Location: [facility] OR    MAMMOPLASTY AUGMENTATION Bilateral 2015    wisdom teeth      ZZC BREAST AUGMENTATION          Family History   Problem Relation Age of Onset    Depression Mother     Colon Polyps Mother     Melanoma Sister     Mental Illness Brother         schizoaffective disorder    Gastrointestinal Disease Brother         ulcerative colitis    Cancer Maternal Grandmother         lymphoma    Cancer Maternal Grandfather         lung ,bone    Cancer Paternal Grandmother 60        ovarian    Breast Cancer Paternal Grandmother 85    Cardiovascular Paternal Grandfather         MI       Social History     Socioeconomic History    Marital status:      Spouse name: Eris    Number of children: 2    Years of education: Not on file    Highest education level: Not on file   Occupational History     Employer: [employer]   Tobacco Use    Smoking status: Never    Smokeless tobacco: Never   Vaping Use    Vaping Use: Never used   Substance and Sexual Activity    Alcohol use: Yes     Comment: rare    Drug  use: No    Sexual activity: Yes     Partners: Male   Other Topics Concern    Parent/sibling w/ CABG, MI or angioplasty before 65F 55M? No   Social History Narrative    Not on file     Social Determinants of Health     Financial Resource Strain: Low Risk  (9/29/2023)    Financial Resource Strain     Within the past 12 months, have you or your family members you live with been unable to get utilities (heat, electricity) when it was really needed?: No   Food Insecurity: Low Risk  (9/29/2023)    Food Insecurity     Within the past 12 months, did you worry that your food would run out before you got money to buy more?: No     Within the past 12 months, did the food you bought just not last and you didn t have money to get more?: No   Transportation Needs: Low Risk  (9/29/2023)    Transportation Needs     Within the past 12 months, has lack of transportation kept you from medical appointments, getting your medicines, non-medical meetings or appointments, work, or from getting things that you need?: No   Physical Activity: Not on file   Stress: Not on file   Social Connections: Not on file   Interpersonal Safety: Not on file   Housing Stability: Low Risk  (9/29/2023)    Housing Stability     Do you have housing? : Yes     Are you worried about losing your housing?: No       Outpatient Encounter Medications as of 10/2/2023   Medication Sig Dispense Refill    acyclovir (ZOVIRAX) 200 MG capsule Take 1 capsule by mouth twice daily 180 capsule 0    buPROPion (WELLBUTRIN XL) 150 MG 24 hr tablet Take 1 tablet (150 mg) by mouth every morning 90 tablet 1    hydrOXYzine (ATARAX) 10 MG tablet Take 1 tablet (10 mg) by mouth every 8 hours as needed for anxiety 30 tablet 1    Multiple Vitamins-Minerals (MULTIVITAMIN PO) Take by mouth daily      scopolamine (TRANSDERM) 1 MG/3DAYS 72 hr patch Place 1 patch onto the skin every 72 hours 3 patch 0    tretinoin (RETIN-A) 0.025 % external cream Use a pea sized amount every other night to  nightly as tolerated 45 g 0     No facility-administered encounter medications on file as of 10/2/2023.             O:   NAD, WDWN, Alert & Oriented, Mood & Affect wnl, Vitals stable   Here today alone   General appearance normal   Vitals stable   Alert, oriented and in no acute distress   L lat thigh 4mm scaly papule   L calf firm papule  L hip stuck on papule   Stuck on papules and brown macules on trunk and ext     Eyes: Conjunctivae/lids:Normal     ENT: Lips, buccal mucosa, tongue: normal    MSK:Normal    Cardiovascular: peripheral edema none    Pulm: Breathing Normal    Neuro/Psych: Orientation:Alert and Orientedx3 ; Mood/Affect:normal       MICRO:   L lat thigh:Unremarkable epidermis, dermis and superficial subcutis.  No concerning areas for malignancy.     A/P:  Atypical nevus  Atypical moles are unusual benign moles that may resemble melanoma. People who have them are at increased risk of developing single or multiple melanomas. The higher the number of these moles someone has, the higher the risk; those who have 10 or more have 12 times the risk of developing melanoma compared to the general population. Atypical nevi are found significantly more often in melanoma patients than in the general population.    While atypical moles are considered to be pre-cancerous (more likely to turn into melanoma than regular moles), not everyone who has atypical moles gets melanoma. In fact, most moles -- both ordinary and atypical ones -- never become cancerous. Thus the removal of all atypical nevi is unnecessary. In fact, most of the melanomas found on people with atypical moles arise from normal skin and not an atypical mole.    Although a physician bases the initial diagnosis of atypical moles on a physical examination, removing several moles and examining them under a microscope must confirm the diagnosis. This procedure, is called a biopsy.    A pathologist will examine the tissue under a microscope and make the  "precise diagnosis. Diagnosis by biopsy is not exact, and in difficult cases doctors may split 50/50 down the middle as to whether a mole is melanoma or benign. If the pathologist uses the term \"severely dysplastic\" or \"atypical melanocytic hyperplasia\" or offers a long descriptive narrative it means he really is concerned about melanoma, but does not want to call it that.    Most dermatologists usually recommend that all patients with these severely dysplastic moles have them removed. Also many dermatologists recommend removing \"moderate dysplasia\" moles, if the biopsy didn't get all of it. Those with \"mild dysplasia\" can usually be left alone or watched.    2. Seborrheic keratosis, letnigo  3. Dermatofibroma  Excision discussed with patient   PROCEDURE NOTE    It was a pleasure speaking to Germania Hayden today.  Previous clinic notes and pertinent laboratory tests were reviewed prior to Germania Hayden's visit.  Signs and Symptoms of skin cancer discussed with patient.  Patient encouraged to perform monthly skin exams.  UV precautions reviewed with patient.  Return to clinic 6 months  L lat thigh severe an   EXCISION OF Atypical nevus, Margins confirmed with FROZEN SECTIONS, MART1 stain AND Second intent: After thorough discussion of PGACAC, consent obtained, anesthesia and prep, the margins of the lesion were identified and an incision was made encompassing the lesion with 4mm margin. The incisions were made through the skin and down to and including the superficial subcutis.  The lesion was removed en bloc and submitted for frozen section pathologic review. Clear margins obtained (1.2cm).  MART1 stain performed one 1 section(s).    REPAIR SECOND INTENT: We discussed the options for wound management in full with the patient including risks/benefits/possible outcomes. Decision made to allow the wound to heal by second intention. EBL minimal; complications none; wound care routine.  The patient was discharged " in good condition and will return in one month or prn for wound evaluation.

## 2023-10-06 ENCOUNTER — OFFICE VISIT (OUTPATIENT)
Dept: FAMILY MEDICINE | Facility: CLINIC | Age: 43
End: 2023-10-06
Payer: COMMERCIAL

## 2023-10-06 VITALS
RESPIRATION RATE: 20 BRPM | SYSTOLIC BLOOD PRESSURE: 98 MMHG | BODY MASS INDEX: 20.86 KG/M2 | OXYGEN SATURATION: 99 % | HEIGHT: 65 IN | HEART RATE: 90 BPM | WEIGHT: 125.2 LBS | TEMPERATURE: 98.3 F | DIASTOLIC BLOOD PRESSURE: 82 MMHG

## 2023-10-06 DIAGNOSIS — F43.23 SITUATIONAL MIXED ANXIETY AND DEPRESSIVE DISORDER: ICD-10-CM

## 2023-10-06 DIAGNOSIS — Z01.818 PREOP GENERAL PHYSICAL EXAM: Primary | ICD-10-CM

## 2023-10-06 DIAGNOSIS — F33.1 MODERATE RECURRENT MAJOR DEPRESSION (H): ICD-10-CM

## 2023-10-06 DIAGNOSIS — N81.4 UTERINE PROLAPSE: ICD-10-CM

## 2023-10-06 DIAGNOSIS — L68.0 HIRSUTISM: ICD-10-CM

## 2023-10-06 LAB
ANION GAP SERPL CALCULATED.3IONS-SCNC: 13 MMOL/L (ref 7–15)
BUN SERPL-MCNC: 19.3 MG/DL (ref 6–20)
CALCIUM SERPL-MCNC: 9.7 MG/DL (ref 8.6–10)
CHLORIDE SERPL-SCNC: 102 MMOL/L (ref 98–107)
CREAT SERPL-MCNC: 0.77 MG/DL (ref 0.51–0.95)
DEPRECATED HCO3 PLAS-SCNC: 25 MMOL/L (ref 22–29)
EGFRCR SERPLBLD CKD-EPI 2021: >90 ML/MIN/1.73M2
ERYTHROCYTE [DISTWIDTH] IN BLOOD BY AUTOMATED COUNT: 12.6 % (ref 10–15)
GLUCOSE SERPL-MCNC: 83 MG/DL (ref 70–99)
HCT VFR BLD AUTO: 39.9 % (ref 35–47)
HGB BLD-MCNC: 12.7 G/DL (ref 11.7–15.7)
MCH RBC QN AUTO: 29.3 PG (ref 26.5–33)
MCHC RBC AUTO-ENTMCNC: 31.8 G/DL (ref 31.5–36.5)
MCV RBC AUTO: 92 FL (ref 78–100)
PLATELET # BLD AUTO: 255 10E3/UL (ref 150–450)
POTASSIUM SERPL-SCNC: 4.1 MMOL/L (ref 3.4–5.3)
RBC # BLD AUTO: 4.34 10E6/UL (ref 3.8–5.2)
SODIUM SERPL-SCNC: 140 MMOL/L (ref 135–145)
TSH SERPL DL<=0.005 MIU/L-ACNC: 0.97 UIU/ML (ref 0.3–4.2)
WBC # BLD AUTO: 6.6 10E3/UL (ref 4–11)

## 2023-10-06 PROCEDURE — 90471 IMMUNIZATION ADMIN: CPT | Performed by: NURSE PRACTITIONER

## 2023-10-06 PROCEDURE — 84443 ASSAY THYROID STIM HORMONE: CPT | Performed by: NURSE PRACTITIONER

## 2023-10-06 PROCEDURE — 80048 BASIC METABOLIC PNL TOTAL CA: CPT | Performed by: NURSE PRACTITIONER

## 2023-10-06 PROCEDURE — 90686 IIV4 VACC NO PRSV 0.5 ML IM: CPT | Performed by: NURSE PRACTITIONER

## 2023-10-06 PROCEDURE — 99214 OFFICE O/P EST MOD 30 MIN: CPT | Mod: 25 | Performed by: NURSE PRACTITIONER

## 2023-10-06 PROCEDURE — 36415 COLL VENOUS BLD VENIPUNCTURE: CPT | Performed by: NURSE PRACTITIONER

## 2023-10-06 PROCEDURE — 85027 COMPLETE CBC AUTOMATED: CPT | Performed by: NURSE PRACTITIONER

## 2023-10-06 ASSESSMENT — PAIN SCALES - GENERAL: PAINLEVEL: NO PAIN (0)

## 2023-10-06 NOTE — LETTER
October 11, 2023      Germania Valverdesai  [address]  [place] [address]        Dear ,    We are writing to inform you of your test results.    Your test results fall within the expected range(s) or remain unchanged from previous results.  Please continue with current treatment plan.    Resulted Orders   TSH with free T4 reflex   Result Value Ref Range    TSH 0.97 0.30 - 4.20 uIU/mL   Basic metabolic panel  (Ca, Cl, CO2, Creat, Gluc, K, Na, BUN)   Result Value Ref Range    Sodium 140 135 - 145 mmol/L      Comment:      Reference intervals for this test were updated on 09/26/2023 to more accurately reflect our healthy population. There may be differences in the flagging of prior results with similar values performed with this method. Interpretation of those prior results can be made in the context of the updated reference intervals.     Potassium 4.1 3.4 - 5.3 mmol/L    Chloride 102 98 - 107 mmol/L    Carbon Dioxide (CO2) 25 22 - 29 mmol/L    Anion Gap 13 7 - 15 mmol/L    Urea Nitrogen 19.3 6.0 - 20.0 mg/dL    Creatinine 0.77 0.51 - 0.95 mg/dL    GFR Estimate >90 >60 mL/min/1.73m2    Calcium 9.7 8.6 - 10.0 mg/dL    Glucose 83 70 - 99 mg/dL   CBC with platelets   Result Value Ref Range    WBC Count 6.6 4.0 - 11.0 10e3/uL    RBC Count 4.34 3.80 - 5.20 10e6/uL    Hemoglobin 12.7 11.7 - 15.7 g/dL    Hematocrit 39.9 35.0 - 47.0 %    MCV 92 78 - 100 fL    MCH 29.3 26.5 - 33.0 pg    MCHC 31.8 31.5 - 36.5 g/dL    RDW 12.6 10.0 - 15.0 %    Platelet Count 255 150 - 450 10e3/uL       If you have any questions or concerns, please call the clinic at the number listed above.       Sincerely,      Alexandra Hart, MICAH

## 2023-10-06 NOTE — PATIENT INSTRUCTIONS
Preparing for Your Surgery  Getting started  A nurse will call you to review your health history and instructions. They will give you an arrival time based on your scheduled surgery time. Please be ready to share:  Your doctor's clinic name and phone number  Your medical, surgical, and anesthesia history  A list of allergies and sensitivities  A list of medicines, including herbal treatments and over-the-counter drugs  Whether the patient has a legal guardian (ask how to send us the papers in advance)  Please tell us if you're pregnant--or if there's any chance you might be pregnant. Some surgeries may injure a fetus (unborn baby), so they require a pregnancy test. Surgeries that are safe for a fetus don't always need a test, and you can choose whether to have one.   If you have a child who's having surgery, please ask for a copy of Preparing for Your Child's Surgery.    Preparing for surgery  Within 10 to 30 days of surgery: Have a pre-op exam (sometimes called an H&P, or History and Physical). This can be done at a clinic or pre-operative center.  If you're having a , you may not need this exam. Talk to your care team.  At your pre-op exam, talk to your care team about all medicines you take. If you need to stop any medicines before surgery, ask when to start taking them again.  We do this for your safety. Many medicines can make you bleed too much during surgery. Some change how well surgery (anesthesia) drugs work.  Call your insurance company to let them know you're having surgery. (If you don't have insurance, call [phone].)  Call your clinic if there's any change in your health. This includes signs of a cold or flu (sore throat, runny nose, cough, rash, fever). It also includes a scrape or scratch near the surgery site.  If you have questions on the day of surgery, call your hospital or surgery center.  Eating and drinking guidelines  For your safety: Unless your surgeon tells you otherwise,  follow the guidelines below.  Eat and drink as usual until 8 hours before you arrive for surgery. After that, no food or milk.  Drink clear liquids until 2 hours before you arrive. These are liquids you can see through, like water, Gatorade, and Propel Water. They also include plain black coffee and tea (no cream or milk), candy, and breath mints. You can spit out gum when you arrive.  If you drink alcohol: Stop drinking it the night before surgery.  If your care team tells you to take medicine on the morning of surgery, it's okay to take it with a sip of water.  Preventing infection  Shower or bathe the night before and morning of your surgery. Follow the instructions your clinic gave you. (If no instructions, use regular soap.)  Don't shave or clip hair near your surgery site. We'll remove the hair if needed.  Don't smoke or vape the morning of surgery. You may chew nicotine gum up to 2 hours before surgery. A nicotine patch is okay.  Note: Some surgeries require you to completely quit smoking and nicotine. Check with your surgeon.  Your care team will make every effort to keep you safe from infection. We will:  Clean our hands often with soap and water (or an alcohol-based hand rub).  Clean the skin at your surgery site with a special soap that kills germs.  Give you a special gown to keep you warm. (Cold raises the risk of infection.)  Wear special hair covers, masks, gowns and gloves during surgery.  Give antibiotic medicine, if prescribed. Not all surgeries need antibiotics.  What to bring on the day of surgery  Photo ID and insurance card  Copy of your health care directive, if you have one  Glasses and hearing aids (bring cases)  You can't wear contacts during surgery  Inhaler and eye drops, if you use them (tell us about these when you arrive)  CPAP machine or breathing device, if you use them  A few personal items, if spending the night  If you have . . .  A pacemaker, ICD (cardiac defibrillator) or other  implant: Bring the ID card.  An implanted stimulator: Bring the remote control.  A legal guardian: Bring a copy of the certified (court-stamped) guardianship papers.  Please remove any jewelry, including body piercings. Leave jewelry and other valuables at home.  If you're going home the day of surgery  You must have a responsible adult drive you home. They should stay with you overnight as well.  If you don't have someone to stay with you, and you aren't safe to go home alone, we may keep you overnight. Insurance often won't pay for this.  After surgery  If it's hard to control your pain or you need more pain medicine, please call your surgeon's office.  Questions?   If you have any questions for your care team, list them here: _________________________________________________________________________________________________________________________________________________________________________ ____________________________________ ____________________________________ ____________________________________  For informational purposes only. Not to replace the advice of your health care provider. Copyright   2003, 2019 [facility]. All rights reserved. Clinically reviewed by Irene Chun MD. [employer] 854659 - REV 12/22.    How to Take Your Medication Before Surgery  - Take all of your medications before surgery except as noted below  - STOP taking all vitamins and herbal supplements 14 days before surgery.

## 2023-10-06 NOTE — PROGRESS NOTES
[facility]  [address] [address]  Phone: [phone]  Primary Provider: Alexandra Hart  Pre-op Performing Provider: ALEXANDRA HART    PREOPERATIVE EVALUATION:  Today's date: 10/6/2023    Germania is a 43 year old female who presents for a preoperative evaluation.      10/6/2023    12:48 PM   Additional Questions   Roomed by Swati WOLFE       Surgical Information:  Surgery/Procedure: HYSTERECTOMY, SUPRACERVICAL, ROBOT-ASSISTED, LAPAROSCOPIC, Bilateral salpingectomy. SACROCOLPOPEXY, ROBOT-ASSISTED, WITH CYSTOSCOPY. COLPORRHAPHY, POSTERIOR   Surgery Location: [facility]  Surgeon: Amy Eubanks MD   Surgery Date: 11/2/2023  Time of Surgery: 12:00 pm   Where patient plans to recover: At home with family  Fax number for surgical facility: Note does not need to be faxed, will be available electronically in Epic.    Assessment & Plan     The proposed surgical procedure is considered INTERMEDIATE risk.    Preop general physical exam     - CBC with platelets    Moderate recurrent major depression (H)     - TSH with free T4 reflex    Situational mixed anxiety and depressive disorder     - TSH with free T4 reflex    Hirsutism     - Basic metabolic panel  (Ca, Cl, CO2, Creat, Gluc, K, Na, BUN)    Uterine prolapse                 - No identified additional risk factors other than previously addressed    Antiplatelet or Anticoagulation Medication Instructions:   - Patient is on no antiplatelet or anticoagulation medications.    Additional Medication Instructions:  Patient is to take all scheduled medications on the day of surgery    RECOMMENDATION:  APPROVAL GIVEN to proceed with proposed procedure, without further diagnostic evaluation.        HPI related to upcoming procedure: Reports uterine, bladder and rectal prolapse since 2nd pregnancy. Reports frequency, urgency, delayed emptying.          9/29/2023     7:53 AM   Preop Questions   1. Have  you ever had a heart attack or stroke? No   2. Have you ever had surgery on your heart or blood vessels, such as a stent placement, a coronary artery bypass, or surgery on an artery in your head, neck, heart, or legs? No   3. Do you have chest pain with activity? No   4. Do you have a history of  heart failure? No   5. Do you currently have a cold, bronchitis or symptoms of other infection? No   6. Do you have a cough, shortness of breath, or wheezing? No   7. Do you or anyone in your family have previous history of blood clots? No   8. Do you or does anyone in your family have a serious bleeding problem such as prolonged bleeding following surgeries or cuts? YES - brother with unknown clotting disorder   9. Have you ever had problems with anemia or been told to take iron pills? No   10. Have you had any abnormal blood loss such as black, tarry or bloody stools, or abnormal vaginal bleeding? No   11. Have you ever had a blood transfusion? No   12. Are you willing to have a blood transfusion if it is medically needed before, during, or after your surgery? Yes   13. Have you or any of your relatives ever had problems with anesthesia? No   14. Do you have sleep apnea, excessive snoring or daytime drowsiness? No   15. Do you have any artifical heart valves or other implanted medical devices like a pacemaker, defibrillator, or continuous glucose monitor? No   16. Do you have artificial joints? No   17. Are you allergic to latex? No   18. Is there any chance that you may be pregnant? No       Health Care Directive:  Patient does not have a Health Care Directive or Living Will: Discussed advance care planning with patient; information given to patient to review.    Preoperative Review of :   reviewed - no record of controlled substances prescribed.       Status of Chronic Conditions:  See problem list for active medical problems.  Problems all longstanding and stable, except as noted/documented.  See ROS for  pertinent symptoms related to these conditions.    Review of Systems  CONSTITUTIONAL: NEGATIVE for fever, chills, change in weight  INTEGUMENTARY/SKIN: NEGATIVE for worrisome rashes, moles or lesions  EYES: NEGATIVE for vision changes or irritation  ENT/MOUTH: NEGATIVE for ear, mouth and throat problems  RESP: NEGATIVE for significant cough or SOB  CV: NEGATIVE for chest pain, palpitations or peripheral edema  GI: NEGATIVE for nausea, abdominal pain, heartburn, or change in bowel habits  : NEGATIVE for frequency, dysuria, or hematuria  MUSCULOSKELETAL: NEGATIVE for significant arthralgias or myalgia  NEURO: NEGATIVE for weakness, dizziness or paresthesias  ENDOCRINE: NEGATIVE for temperature intolerance, skin/hair changes  HEME: NEGATIVE for bleeding problems  PSYCHIATRIC: NEGATIVE for changes in mood or affect    Patient Active Problem List    Diagnosis Date Noted    History of motion sickness 09/08/2023     Priority: Medium    Moderate recurrent major depression (H) 12/19/2019     Priority: Medium    H/O cold sores 04/19/2018     Priority: Medium    Uterovaginal prolapse 10/27/2017     Priority: Medium     Trial of size 4 ring with support pessary 3/2018 due to 2-3+ cystocele, 2+ cervical decensus, 1+ rectocele with strain  Consider future TOTAL VAGINAL HYSTERECTOMY, A & P, SACROSPINOUS VAULT SUSPENSION  Refit with size 6 ruing with support pessary ;3/29/2019        Vulvar varicose veins 04/21/2014     Priority: Medium      Past Medical History:   Diagnosis Date    ASCUS favor benign 12/2011    neg HPV, repeat Pap + HPV cotesting in 3 years    Chickenpox     Depression     Eating disorder     bulemia- age 18 to 32    Spider veins 5-6 years ago    Uterovaginal prolapse 10/27/2017     Past Surgical History:   Procedure Laterality Date    ARTHROSCOPIC RECONSTRUCTION ANTERIOR CRUCIATE LIGAMENT  x3    twice on L knee and once on Rt knee    ESOPHAGOSCOPY, GASTROSCOPY, DUODENOSCOPY (EGD), COMBINED N/A 10/10/2019     "Procedure: ESOPHAGOGASTRODUODENOSCOPY, WITH BIOPSY;  Surgeon: Shirlene Wilson MD;  Location: [facility] OR    MAMMOPLASTY AUGMENTATION Bilateral 2015    wisdom teeth      ZZC BREAST AUGMENTATION       Current Outpatient Medications   Medication Sig Dispense Refill    acyclovir (ZOVIRAX) 200 MG capsule Take 1 capsule by mouth twice daily 180 capsule 0    buPROPion (WELLBUTRIN XL) 150 MG 24 hr tablet Take 1 tablet (150 mg) by mouth every morning 90 tablet 1    hydrOXYzine (ATARAX) 10 MG tablet Take 1 tablet (10 mg) by mouth every 8 hours as needed for anxiety 30 tablet 1    Multiple Vitamins-Minerals (MULTIVITAMIN PO) Take by mouth daily      scopolamine (TRANSDERM) 1 MG/3DAYS 72 hr patch Place 1 patch onto the skin every 72 hours (Patient not taking: Reported on 10/6/2023) 3 patch 0    tretinoin (RETIN-A) 0.025 % external cream Use a pea sized amount every other night to nightly as tolerated (Patient not taking: Reported on 10/6/2023) 45 g 0       No Known Allergies     Social History     Tobacco Use    Smoking status: Never    Smokeless tobacco: Never   Substance Use Topics    Alcohol use: Yes     Comment: rare     Family History   Problem Relation Age of Onset    Depression Mother     Colon Polyps Mother     Melanoma Sister     Mental Illness Brother         schizoaffective disorder    Gastrointestinal Disease Brother         ulcerative colitis    Cancer Maternal Grandmother         lymphoma    Cancer Maternal Grandfather         lung ,bone    Cancer Paternal Grandmother 60        ovarian    Breast Cancer Paternal Grandmother 85    Cardiovascular Paternal Grandfather         MI     History   Drug Use No         Objective     BP 98/82   Pulse 90   Temp 98.3  F (36.8  C) (Tympanic)   Resp 20   Ht 1.651 m (5' 5\")   Wt 56.8 kg (125 lb 3.2 oz)   LMP 09/15/2023 (Approximate)   SpO2 99%   BMI 20.83 kg/m    Wt Readings from Last 4 Encounters:   10/06/23 56.8 kg (125 lb 3.2 oz)   06/06/23 56.7 kg (125 lb) "   03/22/23 54.4 kg (120 lb)   12/06/22 56.6 kg (124 lb 12.8 oz)       Physical Exam    GENERAL APPEARANCE: healthy, alert and no distress     EYES: EOMI, PERRL     HENT: ear canals and TM's normal and nose and mouth without ulcers or lesions     NECK: no adenopathy, no asymmetry, masses, or scars and thyroid normal to palpation     RESP: lungs clear to auscultation - no rales, rhonchi or wheezes     CV: regular rates and rhythm, normal S1 S2, no S3 or S4 and no murmur, click or rub     ABDOMEN:  soft, nontender, no HSM or masses and bowel sounds normal     MS: extremities normal- no gross deformities noted, no evidence of inflammation in joints, FROM in all extremities.     SKIN: no suspicious lesions or rashes     NEURO: Normal strength and tone, sensory exam grossly normal, mentation intact and speech normal     PSYCH: mentation appears normal. and affect normal/bright     LYMPHATICS: No cervical adenopathy    Recent Labs   Lab Test 05/30/22  0004 04/13/22  1355   HGB 13.2  --      --     137   POTASSIUM 3.8 3.9   CR 0.73 0.71        Diagnostics:  Labs pending at this time.  Results will be reviewed when available.   No EKG required, no history of coronary heart disease, significant arrhythmia, peripheral arterial disease or other structural heart disease.    Revised Cardiac Risk Index (RCRI):  The patient has the following serious cardiovascular risks for perioperative complications:   - No serious cardiac risks = 0 points     RCRI Interpretation: 0 points: Class I (very low risk - 0.4% complication rate)         Signed Electronically by: Alexandra Hart DNP  Copy of this evaluation report is provided to requesting physician.

## 2023-10-24 ENCOUNTER — VIRTUAL VISIT (OUTPATIENT)
Dept: UROLOGY | Facility: CLINIC | Age: 43
End: 2023-10-24
Attending: OBSTETRICS & GYNECOLOGY
Payer: COMMERCIAL

## 2023-10-24 VITALS — HEIGHT: 65 IN | BODY MASS INDEX: 20.83 KG/M2 | WEIGHT: 125 LBS

## 2023-10-24 DIAGNOSIS — Z86.19 H/O COLD SORES: ICD-10-CM

## 2023-10-24 DIAGNOSIS — G89.18 POST-OP PAIN: ICD-10-CM

## 2023-10-24 DIAGNOSIS — K59.00 CONSTIPATION, UNSPECIFIED CONSTIPATION TYPE: ICD-10-CM

## 2023-10-24 DIAGNOSIS — N81.4 UTEROVAGINAL PROLAPSE: Primary | ICD-10-CM

## 2023-10-24 PROCEDURE — 99214 OFFICE O/P EST MOD 30 MIN: CPT | Mod: 95 | Performed by: OBSTETRICS & GYNECOLOGY

## 2023-10-24 RX ORDER — DOCUSATE SODIUM 100 MG/1
100 CAPSULE, LIQUID FILLED ORAL 2 TIMES DAILY
Qty: 100 CAPSULE | Refills: 1 | Status: SHIPPED | OUTPATIENT
Start: 2023-10-24 | End: 2024-05-28

## 2023-10-24 RX ORDER — HEPARIN SODIUM 5000 [USP'U]/.5ML
5000 INJECTION, SOLUTION INTRAVENOUS; SUBCUTANEOUS
Status: CANCELLED | OUTPATIENT
Start: 2023-10-24

## 2023-10-24 RX ORDER — OXYCODONE HYDROCHLORIDE 5 MG/1
5 TABLET ORAL EVERY 6 HOURS PRN
Qty: 12 TABLET | Refills: 0 | Status: SHIPPED | OUTPATIENT
Start: 2023-10-24 | End: 2023-10-27

## 2023-10-24 RX ORDER — ACETAMINOPHEN 325 MG/1
975 TABLET ORAL ONCE
Status: CANCELLED | OUTPATIENT
Start: 2023-10-24 | End: 2023-10-24

## 2023-10-24 RX ORDER — PHENAZOPYRIDINE HYDROCHLORIDE 100 MG/1
200 TABLET, FILM COATED ORAL ONCE
Status: CANCELLED | OUTPATIENT
Start: 2023-10-24 | End: 2023-10-24

## 2023-10-24 RX ORDER — ACYCLOVIR 200 MG/1
CAPSULE ORAL
Qty: 180 CAPSULE | Refills: 1 | Status: SHIPPED | OUTPATIENT
Start: 2023-10-24 | End: 2024-04-05

## 2023-10-24 ASSESSMENT — PAIN SCALES - GENERAL: PAINLEVEL: NO PAIN (0)

## 2023-10-24 NOTE — NURSING NOTE
Is the patient currently in the state of [place]? YES    Visit mode:VIDEO    If the visit is dropped, the patient can be reconnected by: VIDEO VISIT: Text to cell phone:   Telephone Information:   Mobile [phone]       Will anyone else be joining the visit? NO  (If patient encounters technical issues they should call [phone]    How would you like to obtain your AVS? Mail a copy    Are changes needed to the allergy or medication list? No    Reason for visit: RECHECK    Medications and allergies have been reviewed.     Can SUAZO

## 2023-10-24 NOTE — LETTER
10/24/2023       RE: Germania Hayden  [address]  [facility] [address]     Dear Colleague,    Thank you for referring your patient, Germania Hayden, to the [facility] [place] at [facility]. Please see a copy of my visit note below.    Virtual Visit Details    Type of service:  Video Visit     Originating Location (pt. Location): Home    Distant Location (provider location):  On-site  Platform used for Video Visit: [email or web address]    Reason for visit: presurgical counseling    Planned procedure: Robotic assisted supracervical hysterectomy, bilateral salpingectomy, sacrocolpopexy. Possible posterior repair, cystoscopy    Date of Planned procedure : 23    Ms Hayden is here for presurgical counseling for the above procedure for her stage 2 uterovaginal prolapse. See prior notes below.     Pelvic US    IMPRESSION:  1. Bilateral indeterminate heterogenous ovarian lesions, which could  represent involuting follicular or hemorrhagic cysts versus  endometriomas or, somewhat less likely, dermoids. Follow-up ultrasound  in 6-12 weeks is suggested to assess for stability versus resolution.  2. Endometrial thickness measures 17 mm.     Management Recommendation:     Possible, but not classic, hemorrhagic cyst, endometrioma or dermoid:    This note was copied and pasted from Dr Eubanks on 21      CC: vaginal bulge    HPI:  Germania Hayden is a 41 year old  female who presents for evaluation of pelvic organ prolapse.   She has a known uterovaginal prolapse which was evaluated by her OBGYN at [facility] a couple of years ago and at that time, she was not too symptomatic and had resorted to expectant management. Today, she says she has been having more pelvic pressure and bulge symptoms which is bothersome to her. No active protrusion or need to reduce prolapse. She is physically very active and feels that this is curtailing her  ability to be active.      Urinary Symptoms/Voiding function  Says she is voiding a bit more frequency than usual and also has slower urine stream. She voids 8 times during the day and none at night. Does not splint to void. She denies any recurrent UTIs or gross hematuria. No bothersome stress or urgency incontinence.     Gastrointestinal Symptoms:  Denies chronic diarrhea, constipation. Denies bothersome fecal or flatal incontinence. Denies defecatory difficulties although has to sometimes lean forward to evacuate fully      Sexual function/Pelvic floor pain/GYN:   Sexually active, no pain with intercourse. Regular monthly cycles. Neg pap and HPV in 3/29/19      Relevant Medical History:    Diabetes? no  High Blood pressure? no     Recurrent UTIs? no  Sleep Apnea? no  Obesity? no  History of Blood clots? no  Other : See med hx      Pelvic Exam:   Normal external female genitalia. The urethra was normal.    Vagina: Stage 2 primarily posterior defect ( rectocele) with some apical and anterior components. No abnormal discharge  Uterus: Normal size, non tender. Normal appearing cervix  Ovaries: not palpable, no masses  Vulva: no lesion , some discoloration/irritation around anal opening. Pruritic.   Rectal: rectocele as above. No visible or palpable hemorrhoids    Pelvic floor strength: 2/5 kegels.    Pelvic floor muscles: No levator myalgia. Normal tone    POPQ EXAM FOR PROLAPSE SEVERITY  (Aa):   -1 (Ba):   -1 (standing) (C ):    -6   (GH):   4 (PB):  2 (TVL):  10   (Ap):   0 (Bp):  0 (D):   -8     ICS Stage (1-4):  2    Voiding trial:    VOID 600 ml  PVR 49 mL by Bladder ultrasound         Assessment and Plan    Germania was seen today for recheck.    Diagnoses and all orders for this visit:    Uterovaginal prolapse    Post-op pain  -     oxyCODONE (ROXICODONE) 5 MG tablet; Take 1 tablet (5 mg) by mouth every 6 hours as needed for pain    Constipation, unspecified constipation type  -     docusate sodium (COLACE) 100  MG capsule; Take 1 capsule (100 mg) by mouth 2 times daily      Discussed at length her surgery, risks, post op recovery instructions   Sacrocolpopexy     Concurrent Hysterectomy:  A supracervical hysterectomy will be performed, and PAP smears may still be needed. The uterus will be removed in a bag through the umbilicus which may involve a larger incision in the umbilicus. There is a small risk of needing to convert from a laparoscopic to an open approach. If this occurs, patients usually requiring a longer hospital stay secondary to pain control.   Concurrent bilateral salpingoophorectomy: The benefits of concurrent bilateral salpingo oophorectomy were discussed including reduction in the risk of future ovarian cancer. Risks include but not limited to bleeding, injury to surrounding organs, loss of hormonal benefits. She has some cystic findings on her recent pelvic US. We discussed that if indicated we will remove the cysts and send to pathology. If abnormal, this may necessitate removing one or more ovaries.     Sacrocolpopexy: In a laparoscopic (or robotic approach surgery), the surgery is done through 5 approximately half inch abdominal incisions. Permanent mesh graft material will be used to reinforce the front and back wall of the vagina allowing us to attach the vagina through these graft material to strong ligaments that overlay the sacrum and there is small risk of mesh exposure (that may range from 1 in 20 to 1 in 10 patients. Mesh can erode into the vagina, the urinary tract or the bowel/colon. Some patients can develop chronic painful intercourse which can make vaginal intercourse not possible and some can develop severe debilitating pain. This may require further therapy including possible surgery.    We also discussed the risk of occult urinary incontinence, which can be reduced with a prophylactic sling, though does not decrease this risk to zero. Incontinence slings have risks associated with them  including risk of bleeding, injury to surrounding organs, urinary retention, mesh erosion etc. In her case, given no current incontinence, we discussed that she would like to avoid doing any incontinence surgery and only consider if this becomes an issue    Discussed that although we are planning to do this as an outpatient, you may need to stay in the hospital for 1 or more nights if more postoperative observation is warranted.  Some patients will be unable to void after surgery and will go home with a catheter. They will return to clinic a few days after for a repeat voiding trial and most will spontaneously void at that time. Recovery time is typically 6 weeks, though this varies from person to person and we recommend avoiding heavy lifting (>10 lbs.) for 6-8 weeks after surgery, as well as avoiding constipation.     Risks: We discussed the procedure, alternatives and risks using appropriate images and handouts as needed. Common risks of surgery were reviewed, including blood loss with the possible need for a blood transfusion, infection, complications from anesthesia, cardiopulmonary complications, and injury to surrounding organs, including the bowel, bladder, ureter as well as injury to blood vessels and nerves, , blood clots  during or several days to weeks after surgery and postoperative pain that necessitates active management with or without narcotics If an injury to an organ occurs during surgery, most can be repaired at the time of surgery where abdominal laparotomy may be necessary. However, there are rare occasions where an additional surgery is needed. Attention to careful positioning will be performed, though there is a risk of nerve injury especially with longer procedures. Prophylactic antibiotics will be given at the time of surgery, and sequential compression devices will be placed to prevent blood clots. .     Longer term risks of recurrent prolapse, urinary incontinence, and persistent chronic  pelvic pain discussed.    Alternatives: We had previously discussed this options. No treatment with kegels alone for possible symptom relief, pessary for prolapse support and surgical repair with native tissue alone with or without obliteration of the vagina.  We discussed that if we chose non obliterative native tissue repair, the outcome may not be as durable as mesh repair. We also discussed the option of obliterative vaginal native tissue repair that has good durability, is minimally invasive with faster recovery. However, it also involves shortening and narrowing of vagina that does not allow penetrative intercourse.     All questions were answered. The consent was reviewed and will be signed on the day of surgery. She would  accept blood transfusion if deemed necessary         I spent 30 on video with Germania Hayden on the date of the encounter in chart review, patient visit, review of tests, documentation and/or discussion with other providers about the issues documented above.     Amy Eubanks MD

## 2023-10-24 NOTE — H&P
Virtual Visit Details    Type of service:  Video Visit     Originating Location (pt. Location): Home    Distant Location (provider location):  On-site  Platform used for Video Visit: Andi    Reason for visit: presurgical counseling    Planned procedure: Robotic assisted supracervical hysterectomy, bilateral salpingectomy, sacrocolpopexy. Possible posterior repair, cystoscopy    Date of Planned procedure : 23    Ms Hayden is here for presurgical counseling for the above procedure for her stage 2 uterovaginal prolapse. See prior notes below. Had normal preop evaluation .     Pelvic US    IMPRESSION:  1. Bilateral indeterminate heterogenous ovarian lesions, which could  represent involuting follicular or hemorrhagic cysts versus  endometriomas or, somewhat less likely, dermoids. Follow-up ultrasound  in 6-12 weeks is suggested to assess for stability versus resolution.  2. Endometrial thickness measures 17 mm.     Management Recommendation:     Possible, but not classic, hemorrhagic cyst, endometrioma or dermoid:    This note was copied and pasted from Dr Eubanks on 21      CC: vaginal bulge    HPI:  Germania Hayden is a 41 year old  female who presents for evaluation of pelvic organ prolapse.   She has a known uterovaginal prolapse which was evaluated by her OBGYN at [facility] a couple of years ago and at that time, she was not too symptomatic and had resorted to expectant management. Today, she says she has been having more pelvic pressure and bulge symptoms which is bothersome to her. No active protrusion or need to reduce prolapse. She is physically very active and feels that this is curtailing her ability to be active.      Urinary Symptoms/Voiding function  Says she is voiding a bit more frequency than usual and also has slower urine stream. She voids 8 times during the day and none at night. Does not splint to void. She denies any recurrent UTIs or gross hematuria. No bothersome stress  "or urgency incontinence.     Gastrointestinal Symptoms:  Denies chronic diarrhea, constipation. Denies bothersome fecal or flatal incontinence. Denies defecatory difficulties although has to sometimes lean forward to evacuate fully      Sexual function/Pelvic floor pain/GYN:   Sexually active, no pain with intercourse. Regular monthly cycles. Neg pap and HPV in 3/29/19      Relevant Medical History:    Diabetes? no  High Blood pressure? no     Recurrent UTIs? no  Sleep Apnea? no  Obesity? no  History of Blood clots? no  Other : See med hx      Pelvic Exam:   Normal external female genitalia. The urethra was normal.    Vagina: Stage 2 primarily posterior defect ( rectocele) with some apical and anterior components. No abnormal discharge  Uterus: Normal size, non tender. Normal appearing cervix  Ovaries: not palpable, no masses  Vulva: no lesion , some discoloration/irritation around anal opening. Pruritic.   Rectal: rectocele as above. No visible or palpable hemorrhoids    Pelvic floor strength: 2/5 kegels.    Pelvic floor muscles: No levator myalgia. Normal tone    POPQ EXAM FOR PROLAPSE SEVERITY  (Aa):   -1 (Ba):   -1 (standing) (C ):    -6   (GH):   4 (PB):  2 (TVL):  10   (Ap):   0 (Bp):  0 (D):   -8     ICS Stage (1-4):  2    Voiding trial:    VOID 600 ml  PVR 49 mL by Bladder ultrasound         Lab Results   Component Value Date    WBC 6.6 10/06/2023    WBC 5.5 08/19/2019     Lab Results   Component Value Date    RBC 4.34 10/06/2023    RBC 4.56 08/19/2019     Lab Results   Component Value Date    HGB 12.7 10/06/2023    HGB 13.3 08/19/2019     Lab Results   Component Value Date    HCT 39.9 10/06/2023    HCT 42.5 08/19/2019     No components found for: \"MCT\"  Lab Results   Component Value Date    MCV 92 10/06/2023    MCV 93 08/19/2019     Lab Results   Component Value Date    MCH 29.3 10/06/2023    MCH 29.2 08/19/2019     Lab Results   Component Value Date    MCHC 31.8 10/06/2023    MCHC 31.3 08/19/2019     Lab " Results   Component Value Date    RDW 12.6 10/06/2023    RDW 12.8 08/19/2019     Lab Results   Component Value Date     10/06/2023     08/19/2019     Last Comprehensive Metabolic Panel:  Lab Results   Component Value Date     10/06/2023    POTASSIUM 4.1 10/06/2023    CHLORIDE 102 10/06/2023    CO2 25 10/06/2023    ANIONGAP 13 10/06/2023    GLC 83 10/06/2023    BUN 19.3 10/06/2023    CR 0.77 10/06/2023    GFRESTIMATED >90 10/06/2023    CHERIE 9.7 10/06/2023            Assessment and Plan    Germania was seen today for recheck.    Diagnoses and all orders for this visit:    Uterovaginal prolapse    Post-op pain  -     oxyCODONE (ROXICODONE) 5 MG tablet; Take 1 tablet (5 mg) by mouth every 6 hours as needed for pain    Constipation, unspecified constipation type  -     docusate sodium (COLACE) 100 MG capsule; Take 1 capsule (100 mg) by mouth 2 times daily      Discussed at length her surgery, risks, post op recovery instructions   Sacrocolpopexy     Concurrent Hysterectomy:  A supracervical hysterectomy will be performed, and PAP smears may still be needed. The uterus will be removed in a bag through the umbilicus which may involve a larger incision in the umbilicus. There is a small risk of needing to convert from a laparoscopic to an open approach. If this occurs, patients usually requiring a longer hospital stay secondary to pain control.   Concurrent bilateral salpingoophorectomy: The benefits of concurrent bilateral salpingo oophorectomy were discussed including reduction in the risk of future ovarian cancer. Risks include but not limited to bleeding, injury to surrounding organs, loss of hormonal benefits. She has some cystic findings on her recent pelvic US. We discussed that if indicated we will remove the cysts and send to pathology. If abnormal, this may necessitate removing one or more ovaries.     Sacrocolpopexy: In a laparoscopic (or robotic approach surgery), the surgery is done through 5  approximately half inch abdominal incisions. Permanent mesh graft material will be used to reinforce the front and back wall of the vagina allowing us to attach the vagina through these graft material to strong ligaments that overlay the sacrum and there is small risk of mesh exposure (that may range from 1 in 20 to 1 in 10 patients. Mesh can erode into the vagina, the urinary tract or the bowel/colon. Some patients can develop chronic painful intercourse which can make vaginal intercourse not possible and some can develop severe debilitating pain. This may require further therapy including possible surgery.    We also discussed the risk of occult urinary incontinence, which can be reduced with a prophylactic sling, though does not decrease this risk to zero. Incontinence slings have risks associated with them including risk of bleeding, injury to surrounding organs, urinary retention, mesh erosion etc. In her case, given no current incontinence, we discussed that she would like to avoid doing any incontinence surgery and only consider if this becomes an issue    Discussed that although we are planning to do this as an outpatient, you may need to stay in the hospital for 1 or more nights if more postoperative observation is warranted.  Some patients will be unable to void after surgery and will go home with a catheter. They will return to clinic a few days after for a repeat voiding trial and most will spontaneously void at that time. Recovery time is typically 6 weeks, though this varies from person to person and we recommend avoiding heavy lifting (>10 lbs.) for 6-8 weeks after surgery, as well as avoiding constipation.     Risks: We discussed the procedure, alternatives and risks using appropriate images and handouts as needed. Common risks of surgery were reviewed, including blood loss with the possible need for a blood transfusion, infection, complications from anesthesia, cardiopulmonary complications, and  injury to surrounding organs, including the bowel, bladder, ureter as well as injury to blood vessels and nerves, , blood clots  during or several days to weeks after surgery and postoperative pain that necessitates active management with or without narcotics If an injury to an organ occurs during surgery, most can be repaired at the time of surgery where abdominal laparotomy may be necessary. However, there are rare occasions where an additional surgery is needed. Attention to careful positioning will be performed, though there is a risk of nerve injury especially with longer procedures. Prophylactic antibiotics will be given at the time of surgery, and sequential compression devices will be placed to prevent blood clots. .     Longer term risks of recurrent prolapse, urinary incontinence, and persistent chronic pelvic pain discussed.    Alternatives: We had previously discussed this options. No treatment with kegels alone for possible symptom relief, pessary for prolapse support and surgical repair with native tissue alone with or without obliteration of the vagina.  We discussed that if we chose non obliterative native tissue repair, the outcome may not be as durable as mesh repair. We also discussed the option of obliterative vaginal native tissue repair that has good durability, is minimally invasive with faster recovery. However, it also involves shortening and narrowing of vagina that does not allow penetrative intercourse.     All questions were answered. The consent was reviewed and will be signed on the day of surgery. She would  accept blood transfusion if deemed necessary         I spent 30 on video with Germania Hayden on the date of the encounter in chart review, patient visit, review of tests, documentation and/or discussion with other providers about the issues documented above.

## 2023-10-24 NOTE — PROGRESS NOTES
Virtual Visit Details    Type of service:  Video Visit     Originating Location (pt. Location): Home    Distant Location (provider location):  On-site  Platform used for Video Visit: Andi    Reason for visit: presurgical counseling    Planned procedure: Robotic assisted supracervical hysterectomy, bilateral salpingectomy, sacrocolpopexy. Possible posterior repair, cystoscopy    Date of Planned procedure : 23    Ms Hayden is here for presurgical counseling for the above procedure for her stage 2 uterovaginal prolapse. See prior notes below.     Pelvic US    IMPRESSION:  1. Bilateral indeterminate heterogenous ovarian lesions, which could  represent involuting follicular or hemorrhagic cysts versus  endometriomas or, somewhat less likely, dermoids. Follow-up ultrasound  in 6-12 weeks is suggested to assess for stability versus resolution.  2. Endometrial thickness measures 17 mm.     Management Recommendation:     Possible, but not classic, hemorrhagic cyst, endometrioma or dermoid:    This note was copied and pasted from Dr Eubanks on 21      CC: vaginal bulge    HPI:  Germania Hayden is a 41 year old  female who presents for evaluation of pelvic organ prolapse.   She has a known uterovaginal prolapse which was evaluated by her OBGYN at [facility] a couple of years ago and at that time, she was not too symptomatic and had resorted to expectant management. Today, she says she has been having more pelvic pressure and bulge symptoms which is bothersome to her. No active protrusion or need to reduce prolapse. She is physically very active and feels that this is curtailing her ability to be active.      Urinary Symptoms/Voiding function  Says she is voiding a bit more frequency than usual and also has slower urine stream. She voids 8 times during the day and none at night. Does not splint to void. She denies any recurrent UTIs or gross hematuria. No bothersome stress or urgency incontinence.      Gastrointestinal Symptoms:  Denies chronic diarrhea, constipation. Denies bothersome fecal or flatal incontinence. Denies defecatory difficulties although has to sometimes lean forward to evacuate fully      Sexual function/Pelvic floor pain/GYN:   Sexually active, no pain with intercourse. Regular monthly cycles. Neg pap and HPV in 3/29/19      Relevant Medical History:    Diabetes? no  High Blood pressure? no     Recurrent UTIs? no  Sleep Apnea? no  Obesity? no  History of Blood clots? no  Other : See med hx      Pelvic Exam:   Normal external female genitalia. The urethra was normal.    Vagina: Stage 2 primarily posterior defect ( rectocele) with some apical and anterior components. No abnormal discharge  Uterus: Normal size, non tender. Normal appearing cervix  Ovaries: not palpable, no masses  Vulva: no lesion , some discoloration/irritation around anal opening. Pruritic.   Rectal: rectocele as above. No visible or palpable hemorrhoids    Pelvic floor strength: 2/5 kegels.    Pelvic floor muscles: No levator myalgia. Normal tone    POPQ EXAM FOR PROLAPSE SEVERITY  (Aa):   -1 (Ba):   -1 (standing) (C ):    -6   (GH):   4 (PB):  2 (TVL):  10   (Ap):   0 (Bp):  0 (D):   -8     ICS Stage (1-4):  2    Voiding trial:    VOID 600 ml  PVR 49 mL by Bladder ultrasound         Assessment and Plan    Germania was seen today for recheck.    Diagnoses and all orders for this visit:    Uterovaginal prolapse    Post-op pain  -     oxyCODONE (ROXICODONE) 5 MG tablet; Take 1 tablet (5 mg) by mouth every 6 hours as needed for pain    Constipation, unspecified constipation type  -     docusate sodium (COLACE) 100 MG capsule; Take 1 capsule (100 mg) by mouth 2 times daily      Discussed at length her surgery, risks, post op recovery instructions   Sacrocolpopexy     Concurrent Hysterectomy:  A supracervical hysterectomy will be performed, and PAP smears may still be needed. The uterus will be removed in a bag through the  umbilicus which may involve a larger incision in the umbilicus. There is a small risk of needing to convert from a laparoscopic to an open approach. If this occurs, patients usually requiring a longer hospital stay secondary to pain control.   Concurrent bilateral salpingoophorectomy: The benefits of concurrent bilateral salpingo oophorectomy were discussed including reduction in the risk of future ovarian cancer. Risks include but not limited to bleeding, injury to surrounding organs, loss of hormonal benefits. She has some cystic findings on her recent pelvic US. We discussed that if indicated we will remove the cysts and send to pathology. If abnormal, this may necessitate removing one or more ovaries.     Sacrocolpopexy: In a laparoscopic (or robotic approach surgery), the surgery is done through 5 approximately half inch abdominal incisions. Permanent mesh graft material will be used to reinforce the front and back wall of the vagina allowing us to attach the vagina through these graft material to strong ligaments that overlay the sacrum and there is small risk of mesh exposure (that may range from 1 in 20 to 1 in 10 patients. Mesh can erode into the vagina, the urinary tract or the bowel/colon. Some patients can develop chronic painful intercourse which can make vaginal intercourse not possible and some can develop severe debilitating pain. This may require further therapy including possible surgery.    We also discussed the risk of occult urinary incontinence, which can be reduced with a prophylactic sling, though does not decrease this risk to zero. Incontinence slings have risks associated with them including risk of bleeding, injury to surrounding organs, urinary retention, mesh erosion etc. In her case, given no current incontinence, we discussed that she would like to avoid doing any incontinence surgery and only consider if this becomes an issue    Discussed that although we are planning to do this as  an outpatient, you may need to stay in the hospital for 1 or more nights if more postoperative observation is warranted.  Some patients will be unable to void after surgery and will go home with a catheter. They will return to clinic a few days after for a repeat voiding trial and most will spontaneously void at that time. Recovery time is typically 6 weeks, though this varies from person to person and we recommend avoiding heavy lifting (>10 lbs.) for 6-8 weeks after surgery, as well as avoiding constipation.     Risks: We discussed the procedure, alternatives and risks using appropriate images and handouts as needed. Common risks of surgery were reviewed, including blood loss with the possible need for a blood transfusion, infection, complications from anesthesia, cardiopulmonary complications, and injury to surrounding organs, including the bowel, bladder, ureter as well as injury to blood vessels and nerves, , blood clots  during or several days to weeks after surgery and postoperative pain that necessitates active management with or without narcotics If an injury to an organ occurs during surgery, most can be repaired at the time of surgery where abdominal laparotomy may be necessary. However, there are rare occasions where an additional surgery is needed. Attention to careful positioning will be performed, though there is a risk of nerve injury especially with longer procedures. Prophylactic antibiotics will be given at the time of surgery, and sequential compression devices will be placed to prevent blood clots. .     Longer term risks of recurrent prolapse, urinary incontinence, and persistent chronic pelvic pain discussed.    Alternatives: We had previously discussed this options. No treatment with kegels alone for possible symptom relief, pessary for prolapse support and surgical repair with native tissue alone with or without obliteration of the vagina.  We discussed that if we chose non obliterative  native tissue repair, the outcome may not be as durable as mesh repair. We also discussed the option of obliterative vaginal native tissue repair that has good durability, is minimally invasive with faster recovery. However, it also involves shortening and narrowing of vagina that does not allow penetrative intercourse.     All questions were answered. The consent was reviewed and will be signed on the day of surgery. She would  accept blood transfusion if deemed necessary         I spent 30 on video with Germania Hayden on the date of the encounter in chart review, patient visit, review of tests, documentation and/or discussion with other providers about the issues documented above.

## 2023-11-01 ENCOUNTER — ANESTHESIA EVENT (OUTPATIENT)
Dept: SURGERY | Facility: CLINIC | Age: 43
End: 2023-11-01
Payer: COMMERCIAL

## 2023-11-02 ENCOUNTER — HOSPITAL ENCOUNTER (OUTPATIENT)
Facility: CLINIC | Age: 43
Discharge: HOME | End: 2023-11-02
Attending: OBSTETRICS & GYNECOLOGY | Admitting: OBSTETRICS & GYNECOLOGY
Payer: COMMERCIAL

## 2023-11-02 ENCOUNTER — ANESTHESIA (OUTPATIENT)
Dept: SURGERY | Facility: CLINIC | Age: 43
End: 2023-11-02
Payer: COMMERCIAL

## 2023-11-02 VITALS
SYSTOLIC BLOOD PRESSURE: 107 MMHG | HEIGHT: 65 IN | HEART RATE: 105 BPM | TEMPERATURE: 97.3 F | WEIGHT: 125.2 LBS | RESPIRATION RATE: 16 BRPM | BODY MASS INDEX: 20.86 KG/M2 | DIASTOLIC BLOOD PRESSURE: 75 MMHG | OXYGEN SATURATION: 97 %

## 2023-11-02 DIAGNOSIS — N81.4 UTEROVAGINAL PROLAPSE: Primary | ICD-10-CM

## 2023-11-02 LAB — HCG UR QL: NEGATIVE

## 2023-11-02 PROCEDURE — 250N000011 HC RX IP 250 OP 636: Performed by: NURSE ANESTHETIST, CERTIFIED REGISTERED

## 2023-11-02 PROCEDURE — 250N000013 HC RX MED GY IP 250 OP 250 PS 637: Performed by: OBSTETRICS & GYNECOLOGY

## 2023-11-02 PROCEDURE — 710N000009 HC RECOVERY PHASE 1, LEVEL 1, PER MIN: Performed by: OBSTETRICS & GYNECOLOGY

## 2023-11-02 PROCEDURE — 250N000025 HC SEVOFLURANE, PER MIN: Performed by: OBSTETRICS & GYNECOLOGY

## 2023-11-02 PROCEDURE — 250N000009 HC RX 250: Performed by: ANESTHESIOLOGY

## 2023-11-02 PROCEDURE — 88331 PATH CONSLTJ SURG 1 BLK 1SPC: CPT | Mod: TC | Performed by: OBSTETRICS & GYNECOLOGY

## 2023-11-02 PROCEDURE — 88305 TISSUE EXAM BY PATHOLOGIST: CPT | Mod: 26 | Performed by: PATHOLOGY

## 2023-11-02 PROCEDURE — 250N000009 HC RX 250: Performed by: OBSTETRICS & GYNECOLOGY

## 2023-11-02 PROCEDURE — 272N000001 HC OR GENERAL SUPPLY STERILE: Performed by: OBSTETRICS & GYNECOLOGY

## 2023-11-02 PROCEDURE — 250N000013 HC RX MED GY IP 250 OP 250 PS 637: Performed by: ANESTHESIOLOGY

## 2023-11-02 PROCEDURE — 710N000012 HC RECOVERY PHASE 2, PER MINUTE: Performed by: OBSTETRICS & GYNECOLOGY

## 2023-11-02 PROCEDURE — 258N000003 HC RX IP 258 OP 636: Performed by: NURSE ANESTHETIST, CERTIFIED REGISTERED

## 2023-11-02 PROCEDURE — 258N000001 HC RX 258: Performed by: OBSTETRICS & GYNECOLOGY

## 2023-11-02 PROCEDURE — 88331 PATH CONSLTJ SURG 1 BLK 1SPC: CPT | Mod: 26 | Performed by: PATHOLOGY

## 2023-11-02 PROCEDURE — 81025 URINE PREGNANCY TEST: CPT | Performed by: OBSTETRICS & GYNECOLOGY

## 2023-11-02 PROCEDURE — 58542 LSH W/T/O UT 250 G OR LESS: CPT | Mod: GC | Performed by: OBSTETRICS & GYNECOLOGY

## 2023-11-02 PROCEDURE — 370N000017 HC ANESTHESIA TECHNICAL FEE, PER MIN: Performed by: OBSTETRICS & GYNECOLOGY

## 2023-11-02 PROCEDURE — 57425 LAPAROSCOPY SURG COLPOPEXY: CPT | Mod: GC | Performed by: OBSTETRICS & GYNECOLOGY

## 2023-11-02 PROCEDURE — 88305 TISSUE EXAM BY PATHOLOGIST: CPT | Mod: TC | Performed by: OBSTETRICS & GYNECOLOGY

## 2023-11-02 PROCEDURE — 250N000011 HC RX IP 250 OP 636: Performed by: OBSTETRICS & GYNECOLOGY

## 2023-11-02 PROCEDURE — 999N000141 HC STATISTIC PRE-PROCEDURE NURSING ASSESSMENT: Performed by: OBSTETRICS & GYNECOLOGY

## 2023-11-02 PROCEDURE — 360N000080 HC SURGERY LEVEL 7, PER MIN: Performed by: OBSTETRICS & GYNECOLOGY

## 2023-11-02 PROCEDURE — C1781 MESH (IMPLANTABLE): HCPCS | Performed by: OBSTETRICS & GYNECOLOGY

## 2023-11-02 PROCEDURE — 250N000011 HC RX IP 250 OP 636: Mod: JZ | Performed by: NURSE ANESTHETIST, CERTIFIED REGISTERED

## 2023-11-02 PROCEDURE — 250N000011 HC RX IP 250 OP 636: Mod: JZ | Performed by: ANESTHESIOLOGY

## 2023-11-02 PROCEDURE — 250N000011 HC RX IP 250 OP 636: Performed by: ANESTHESIOLOGY

## 2023-11-02 PROCEDURE — 250N000009 HC RX 250: Performed by: NURSE ANESTHETIST, CERTIFIED REGISTERED

## 2023-11-02 DEVICE — MESH SLING FLAT RESTORELLE 24X8CM 501440: Type: IMPLANTABLE DEVICE | Site: ABDOMEN | Status: FUNCTIONAL

## 2023-11-02 RX ORDER — ACETAMINOPHEN 500 MG
1000 TABLET ORAL ONCE
Status: COMPLETED | OUTPATIENT
Start: 2023-11-02 | End: 2023-11-02

## 2023-11-02 RX ORDER — DEXAMETHASONE SODIUM PHOSPHATE 4 MG/ML
INJECTION, SOLUTION INTRA-ARTICULAR; INTRALESIONAL; INTRAMUSCULAR; INTRAVENOUS; SOFT TISSUE PRN
Status: DISCONTINUED | OUTPATIENT
Start: 2023-11-02 | End: 2023-11-02

## 2023-11-02 RX ORDER — HYDROMORPHONE HCL IN WATER/PF 6 MG/30 ML
0.4 PATIENT CONTROLLED ANALGESIA SYRINGE INTRAVENOUS EVERY 5 MIN PRN
Status: DISCONTINUED | OUTPATIENT
Start: 2023-11-02 | End: 2023-11-02 | Stop reason: HOSPADM

## 2023-11-02 RX ORDER — ONDANSETRON 4 MG/1
4 TABLET, ORALLY DISINTEGRATING ORAL EVERY 30 MIN PRN
Status: DISCONTINUED | OUTPATIENT
Start: 2023-11-02 | End: 2023-11-02 | Stop reason: HOSPADM

## 2023-11-02 RX ORDER — IBUPROFEN 800 MG/1
800 TABLET, FILM COATED ORAL EVERY 6 HOURS PRN
COMMUNITY
Start: 2023-11-02 | End: 2024-03-22

## 2023-11-02 RX ORDER — ACETAMINOPHEN 325 MG/1
975 TABLET ORAL ONCE
Status: DISCONTINUED | OUTPATIENT
Start: 2023-11-02 | End: 2023-11-02

## 2023-11-02 RX ORDER — BUPIVACAINE HYDROCHLORIDE AND EPINEPHRINE 2.5; 5 MG/ML; UG/ML
INJECTION, SOLUTION INFILTRATION; PERINEURAL PRN
Status: DISCONTINUED | OUTPATIENT
Start: 2023-11-02 | End: 2023-11-02 | Stop reason: HOSPADM

## 2023-11-02 RX ORDER — FENTANYL CITRATE 50 UG/ML
INJECTION, SOLUTION INTRAMUSCULAR; INTRAVENOUS PRN
Status: DISCONTINUED | OUTPATIENT
Start: 2023-11-02 | End: 2023-11-02

## 2023-11-02 RX ORDER — HYDROMORPHONE HYDROCHLORIDE 1 MG/ML
INJECTION, SOLUTION INTRAMUSCULAR; INTRAVENOUS; SUBCUTANEOUS PRN
Status: DISCONTINUED | OUTPATIENT
Start: 2023-11-02 | End: 2023-11-02

## 2023-11-02 RX ORDER — ACETAMINOPHEN 325 MG/1
975 TABLET ORAL EVERY 6 HOURS PRN
COMMUNITY
Start: 2023-11-02

## 2023-11-02 RX ORDER — PHENAZOPYRIDINE HYDROCHLORIDE 200 MG/1
200 TABLET, FILM COATED ORAL ONCE
Status: COMPLETED | OUTPATIENT
Start: 2023-11-02 | End: 2023-11-02

## 2023-11-02 RX ORDER — LIDOCAINE 40 MG/G
CREAM TOPICAL
Status: DISCONTINUED | OUTPATIENT
Start: 2023-11-02 | End: 2023-11-02 | Stop reason: HOSPADM

## 2023-11-02 RX ORDER — ONDANSETRON 2 MG/ML
INJECTION INTRAMUSCULAR; INTRAVENOUS PRN
Status: DISCONTINUED | OUTPATIENT
Start: 2023-11-02 | End: 2023-11-02

## 2023-11-02 RX ORDER — KETOROLAC TROMETHAMINE 30 MG/ML
INJECTION, SOLUTION INTRAMUSCULAR; INTRAVENOUS PRN
Status: DISCONTINUED | OUTPATIENT
Start: 2023-11-02 | End: 2023-11-02

## 2023-11-02 RX ORDER — CEFAZOLIN SODIUM/WATER 2 G/20 ML
2 SYRINGE (ML) INTRAVENOUS
Status: COMPLETED | OUTPATIENT
Start: 2023-11-02 | End: 2023-11-02

## 2023-11-02 RX ORDER — CEFAZOLIN SODIUM/WATER 2 G/20 ML
2 SYRINGE (ML) INTRAVENOUS SEE ADMIN INSTRUCTIONS
Status: DISCONTINUED | OUTPATIENT
Start: 2023-11-02 | End: 2023-11-02 | Stop reason: HOSPADM

## 2023-11-02 RX ORDER — OXYCODONE HYDROCHLORIDE 5 MG/1
5 TABLET ORAL
Status: DISCONTINUED | OUTPATIENT
Start: 2023-11-02 | End: 2023-11-07 | Stop reason: HOSPADM

## 2023-11-02 RX ORDER — ONDANSETRON 2 MG/ML
4 INJECTION INTRAMUSCULAR; INTRAVENOUS EVERY 30 MIN PRN
Status: DISCONTINUED | OUTPATIENT
Start: 2023-11-02 | End: 2023-11-02 | Stop reason: HOSPADM

## 2023-11-02 RX ORDER — HYDROMORPHONE HCL IN WATER/PF 6 MG/30 ML
0.2 PATIENT CONTROLLED ANALGESIA SYRINGE INTRAVENOUS EVERY 5 MIN PRN
Status: DISCONTINUED | OUTPATIENT
Start: 2023-11-02 | End: 2023-11-02 | Stop reason: HOSPADM

## 2023-11-02 RX ORDER — FENTANYL CITRATE 0.05 MG/ML
50 INJECTION, SOLUTION INTRAMUSCULAR; INTRAVENOUS EVERY 5 MIN PRN
Status: DISCONTINUED | OUTPATIENT
Start: 2023-11-02 | End: 2023-11-02 | Stop reason: HOSPADM

## 2023-11-02 RX ORDER — FENTANYL CITRATE 0.05 MG/ML
25 INJECTION, SOLUTION INTRAMUSCULAR; INTRAVENOUS EVERY 5 MIN PRN
Status: DISCONTINUED | OUTPATIENT
Start: 2023-11-02 | End: 2023-11-02 | Stop reason: HOSPADM

## 2023-11-02 RX ORDER — IBUPROFEN 400 MG/1
800 TABLET, FILM COATED ORAL ONCE
Status: DISCONTINUED | OUTPATIENT
Start: 2023-11-02 | End: 2023-11-07 | Stop reason: HOSPADM

## 2023-11-02 RX ORDER — ACETAMINOPHEN 325 MG/1
975 TABLET ORAL ONCE
Status: COMPLETED | OUTPATIENT
Start: 2023-11-02 | End: 2023-11-02

## 2023-11-02 RX ORDER — SODIUM CHLORIDE, SODIUM LACTATE, POTASSIUM CHLORIDE, CALCIUM CHLORIDE 600; 310; 30; 20 MG/100ML; MG/100ML; MG/100ML; MG/100ML
INJECTION, SOLUTION INTRAVENOUS CONTINUOUS PRN
Status: DISCONTINUED | OUTPATIENT
Start: 2023-11-02 | End: 2023-11-02

## 2023-11-02 RX ORDER — FENTANYL CITRATE 0.05 MG/ML
50 INJECTION, SOLUTION INTRAMUSCULAR; INTRAVENOUS
Status: DISCONTINUED | OUTPATIENT
Start: 2023-11-02 | End: 2023-11-02 | Stop reason: HOSPADM

## 2023-11-02 RX ORDER — SODIUM CHLORIDE, SODIUM LACTATE, POTASSIUM CHLORIDE, CALCIUM CHLORIDE 600; 310; 30; 20 MG/100ML; MG/100ML; MG/100ML; MG/100ML
INJECTION, SOLUTION INTRAVENOUS CONTINUOUS
Status: DISCONTINUED | OUTPATIENT
Start: 2023-11-02 | End: 2023-11-02 | Stop reason: HOSPADM

## 2023-11-02 RX ORDER — SCOLOPAMINE TRANSDERMAL SYSTEM 1 MG/1
1 PATCH, EXTENDED RELEASE TRANSDERMAL ONCE
Status: COMPLETED | OUTPATIENT
Start: 2023-11-02 | End: 2023-11-03

## 2023-11-02 RX ORDER — PROPOFOL 10 MG/ML
INJECTION, EMULSION INTRAVENOUS PRN
Status: DISCONTINUED | OUTPATIENT
Start: 2023-11-02 | End: 2023-11-02

## 2023-11-02 RX ORDER — HEPARIN SODIUM 5000 [USP'U]/.5ML
5000 INJECTION, SOLUTION INTRAVENOUS; SUBCUTANEOUS
Status: COMPLETED | OUTPATIENT
Start: 2023-11-02 | End: 2023-11-02

## 2023-11-02 RX ORDER — LIDOCAINE HYDROCHLORIDE 20 MG/ML
INJECTION, SOLUTION INFILTRATION; PERINEURAL PRN
Status: DISCONTINUED | OUTPATIENT
Start: 2023-11-02 | End: 2023-11-02

## 2023-11-02 RX ORDER — PROPOFOL 10 MG/ML
INJECTION, EMULSION INTRAVENOUS CONTINUOUS PRN
Status: DISCONTINUED | OUTPATIENT
Start: 2023-11-02 | End: 2023-11-02

## 2023-11-02 RX ADMIN — ROCURONIUM BROMIDE 20 MG: 50 INJECTION, SOLUTION INTRAVENOUS at 12:42

## 2023-11-02 RX ADMIN — SODIUM CHLORIDE, SODIUM LACTATE, POTASSIUM CHLORIDE, CALCIUM CHLORIDE: 600; 310; 30; 20 INJECTION, SOLUTION INTRAVENOUS at 12:09

## 2023-11-02 RX ADMIN — ROCURONIUM BROMIDE 20 MG: 50 INJECTION, SOLUTION INTRAVENOUS at 14:45

## 2023-11-02 RX ADMIN — PHENYLEPHRINE HYDROCHLORIDE 150 MCG: 10 INJECTION INTRAVENOUS at 12:20

## 2023-11-02 RX ADMIN — SODIUM CHLORIDE, POTASSIUM CHLORIDE, SODIUM LACTATE AND CALCIUM CHLORIDE: 600; 310; 30; 20 INJECTION, SOLUTION INTRAVENOUS at 13:31

## 2023-11-02 RX ADMIN — PROPOFOL 30 MCG/KG/MIN: 10 INJECTION, EMULSION INTRAVENOUS at 12:00

## 2023-11-02 RX ADMIN — SODIUM CHLORIDE, POTASSIUM CHLORIDE, SODIUM LACTATE AND CALCIUM CHLORIDE: 600; 310; 30; 20 INJECTION, SOLUTION INTRAVENOUS at 11:42

## 2023-11-02 RX ADMIN — ONDANSETRON 4 MG: 2 INJECTION INTRAMUSCULAR; INTRAVENOUS at 16:06

## 2023-11-02 RX ADMIN — ACETAMINOPHEN 1000 MG: 500 TABLET, FILM COATED ORAL at 18:48

## 2023-11-02 RX ADMIN — HEPARIN SODIUM 5000 UNITS: 5000 INJECTION, SOLUTION INTRAVENOUS; SUBCUTANEOUS at 10:20

## 2023-11-02 RX ADMIN — SCOPALAMINE 1 PATCH: 1 PATCH, EXTENDED RELEASE TRANSDERMAL at 11:57

## 2023-11-02 RX ADMIN — LIDOCAINE HYDROCHLORIDE 60 MG: 20 INJECTION, SOLUTION INFILTRATION; PERINEURAL at 11:57

## 2023-11-02 RX ADMIN — ROCURONIUM BROMIDE 20 MG: 50 INJECTION, SOLUTION INTRAVENOUS at 13:33

## 2023-11-02 RX ADMIN — KETOROLAC TROMETHAMINE 30 MG: 30 INJECTION, SOLUTION INTRAMUSCULAR at 16:06

## 2023-11-02 RX ADMIN — FENTANYL CITRATE 25 MCG: 50 INJECTION, SOLUTION INTRAMUSCULAR; INTRAVENOUS at 17:17

## 2023-11-02 RX ADMIN — ACETAMINOPHEN 975 MG: 325 TABLET, FILM COATED ORAL at 10:19

## 2023-11-02 RX ADMIN — SUGAMMADEX 120 MG: 100 INJECTION, SOLUTION INTRAVENOUS at 16:33

## 2023-11-02 RX ADMIN — Medication 2 G: at 16:01

## 2023-11-02 RX ADMIN — ROCURONIUM BROMIDE 10 MG: 50 INJECTION, SOLUTION INTRAVENOUS at 14:00

## 2023-11-02 RX ADMIN — MIDAZOLAM 2 MG: 1 INJECTION INTRAMUSCULAR; INTRAVENOUS at 11:49

## 2023-11-02 RX ADMIN — PHENYLEPHRINE HYDROCHLORIDE 150 MCG: 10 INJECTION INTRAVENOUS at 12:32

## 2023-11-02 RX ADMIN — DEXAMETHASONE SODIUM PHOSPHATE 4 MG: 4 INJECTION, SOLUTION INTRA-ARTICULAR; INTRALESIONAL; INTRAMUSCULAR; INTRAVENOUS; SOFT TISSUE at 12:32

## 2023-11-02 RX ADMIN — PHENYLEPHRINE HYDROCHLORIDE 0.35 MCG/KG/MIN: 10 INJECTION INTRAVENOUS at 12:57

## 2023-11-02 RX ADMIN — ROCURONIUM BROMIDE 10 MG: 50 INJECTION, SOLUTION INTRAVENOUS at 14:21

## 2023-11-02 RX ADMIN — FENTANYL CITRATE 100 MCG: 50 INJECTION INTRAMUSCULAR; INTRAVENOUS at 11:57

## 2023-11-02 RX ADMIN — ROCURONIUM BROMIDE 50 MG: 50 INJECTION, SOLUTION INTRAVENOUS at 11:57

## 2023-11-02 RX ADMIN — HYDROMORPHONE HYDROCHLORIDE 0.5 MG: 1 INJECTION, SOLUTION INTRAMUSCULAR; INTRAVENOUS; SUBCUTANEOUS at 12:37

## 2023-11-02 RX ADMIN — PHENYLEPHRINE HYDROCHLORIDE 100 MCG: 10 INJECTION INTRAVENOUS at 12:43

## 2023-11-02 RX ADMIN — PROPOFOL 200 MG: 10 INJECTION, EMULSION INTRAVENOUS at 11:57

## 2023-11-02 RX ADMIN — ROCURONIUM BROMIDE 20 MG: 50 INJECTION, SOLUTION INTRAVENOUS at 13:10

## 2023-11-02 RX ADMIN — Medication 2 G: at 12:01

## 2023-11-02 RX ADMIN — ROCURONIUM BROMIDE 20 MG: 50 INJECTION, SOLUTION INTRAVENOUS at 15:15

## 2023-11-02 RX ADMIN — PHENAZOPYRIDINE 200 MG: 200 TABLET ORAL at 10:19

## 2023-11-02 ASSESSMENT — ACTIVITIES OF DAILY LIVING (ADL)
ADLS_ACUITY_SCORE: 35

## 2023-11-02 ASSESSMENT — LIFESTYLE VARIABLES: TOBACCO_USE: 0

## 2023-11-02 NOTE — DISCHARGE INSTRUCTIONS
** Today you received Toradol, an antiinflammatory medication similar to Ibuprofen.  You should not take other antiinflammatory medication, such as Ibuprofen, Motrin, Advil, Aleve, Naprosyn, etc until 10:00 pm tonight. **    ** Today you were given 975 mg of Tylenol at 10:30am. The recommended daily maximum dose is 4000 mg. **         ** While you were at the hospital today you were given a medication called Pyridium.  This is used to treat pain, burning, increased urination, and increased urge to urinate.  Pyridium will most likely darken the color of your urine to an orange or red color. Darkened urine may also cause stains to your underwear, which may or may not be removed by laundering. **        Same Day Surgery Discharge Instructions for  Sedation and General Anesthesia     It's not unusual to feel dizzy, light-headed or faint for up to 24 hours after surgery or while taking pain medication.  If you have these symptoms: sit for a few minutes before standing and have someone assist you when you get up to walk or use the bathroom.    You should rest and relax for the next 24 hours. We recommend you make arrangements to have an adult stay with you for at least 24 hours after your discharge.  Avoid hazardous and strenuous activity.    DO NOT DRIVE any vehicle or operate mechanical equipment for 24 hours following the end of your surgery.  Even though you may feel normal, your reactions may be affected by the medication you have received.    Do not drink alcoholic beverages for 24 hours following surgery.     Slowly progress to your regular diet as you feel able. It's not unusual to feel nauseated and/or vomit after receiving anesthesia.  If you develop these symptoms, drink clear liquids (apple juice, ginger ale, broth, 7-up, etc. ) until you feel better.  If your nausea and vomiting persists for 24 hours, please notify your surgeon.      All narcotic pain medications, along with inactivity and anesthesia, can  cause constipation. Drinking plenty of liquids and increasing fiber intake will help.    For any questions of a medical nature, call your surgeon.    Do not make important decisions for 24 hours.    If you had general anesthesia, you may have a sore throat for a couple of days related to the breathing tube used during surgery.  You may use Cepacol lozenges to help with this discomfort.  If it worsens or if you develop a fever, contact your surgeon.     If you feel your pain is not well managed with the pain medications prescribed by your surgeon, please contact your surgeon's office to let them know so they can address your concerns.           ** If you have questions or concerns about your procedure,   call Dr. Eubanks @[phone] **       `

## 2023-11-02 NOTE — OP NOTE
Name: Germania Hayden  MRN: [id number]  : 1980  Date of surgery: 2023     Preoperative diagnosis:  - Uterovaginal prolapse; cystocele, midline; rectocele     Postoperative diagnosis:  - Same, s/p below-stated procedure(s)     Procedure(s):  - Robot-assisted laparoscopic supracervical hysterectomy, bilateral salpingectomy, left oophorectomy, sacrocolpopexy; cystoscopy     Surgeon: Amy Eubanks MD  Resident: Lori Carroll MD, PGY-4; Yelena Lowe MD, PGY-2     Anesthesia: GETA with local  EBL: 15cc  Urine output: 300cc pyridium-stained urine  IV fluids: 1300cc crystalloid  Antibiotics: 2g Ancef x2 (4g total due to duration of case)     Specimens:  ID Type Source Tests Collected by Time Destination   1 : Left Ovary with cyst Tissue Ovary, Left SURGICAL PATHOLOGY EXAM Amy Eubanks MD 2023  4:05 PM     2 :  Tissue Uterus, Bilateral Fallopian Tubes SURGICAL PATHOLOGY EXAM Amy Eubanks MD 2023  4:14 PM        Implants:              Implant Name Type Inv. Item Serial No.  Lot No. LRB No. Used Action   MESH SLING FLAT RESTORELLE [id number] - [id number] Mesh MESH SLING FLAT RESTORELLE [id number]   COLOPLAST [id number] N/A 1 Implanted         Findings: Exam under anesthesia revealed small, mobile uterus, patulous cervix. On laparoscopy, no entry injury apparent; liver edge, stomach, bowels appear normal. Uterus, bilateral fallopian tubes, and right ovary normal appearing. Left ovary with 5-cm dark, fluid-filled cyst with dark brown fluid appreciated once removed from abdomen. On cystoscopy at end of case, bladder intact without sign of injury, bilateral ureteral jets actively effluxing pyridium-stained urine. Surgical sites hemostatic at end of case.      Complications: None apparent      The patient was taken to the operating room where team pause was performed . General anesthesia was induced without difficulty.     She was then positioned in the dorsal lithotomy  position in Hesham stirru, on a pink foam pad.  Positioning included placing her arms at her sides with foam padding and tucking in with a drape.  After the patient was placed in what was felt to be a neurologically safe position, deep Trendelenburg position was tested prior to the operative procedure, to ensure that she would not move on the operating table. The patient was then prepped and draped in the normal sterile fashion. A Andrew catheter was placed in the bladder for continous drainage. A [employer] uterine manipulator was introduced into the uterus for manipulation     A dilute solution of 25% bupivicaine with epinephrine was injected supra-umbilically, and a scalpel was used to make a transverse incision just below the umbilical fold. The subcutaneous tissues were dissected until fascia was identified.  Fascia was elevated with Kocher clamps and incised. The edges of the fascia were tagged with 0 Vicryl. The peritoneum was identified and entered bluntly. An 8 mm Robotic  trocar was placed at this site. The abdomen was then insufflated with CO2 gas to 15 mmHg. Visualization of the intraabdominal cavity normal anatomy with normal appearing right ovary and uterus. The left ovary was enlarged to about 5-6 cm size with dark discoloration throughout the surface with appearance of multiple chocolate cysts vs dermoid. Under direct visualization, on the left side, two 8 mm robotic assistant ports were placed, the first being approximately 8 cm lateral  to the umbilicus and the next being 8 cm lateral  to this. Following this , an 8 mm robotic port was placed in the right abdominal wall 8 cm lateral to the umbilical port, and a 12 mm assist port was placed approximately 8 cm lateral and slightly superior to this. The intraabdominal pressure was  Set at 10 mmHG. The patient was placed in steep trendelenburg position.      The [employer] robot was then brought to the operative field in a lateral docking style to the left of  the patient and the robot was docked to the ports. All robotic instruments were brought into the pelvis under direct visualization with monopolar scissors in arm #4, bipolar forceps in arm #2, and a Prograsp in arm #1.        Attention was then placed to the supera-cervical hysterectomy , bilateral salpingectomy  portion of the procedure. Given the appearance of the left ovary and patient's concerns about ovarian cancer ( her grandmother had ovarian cancer), decision was made to remove the left ovary. The IP ligament was identified , cauterized and cut after the left ureter was seen peristalising far away from the IP. The tubes were  first identified and dissected off the underlying mesosalpinx bilaterally. The tuboovarian and round ligaments on the left was then cauterized and transected.  The broad ligament was then bluntly dissected with monopolar scissors and transected serially to the level of the uterine arteries. The bladder was then dissected off of the anterior cervix using monopolar scissors. The uterine artery was then cauterized, and cut. A similar step was repeated on the opposite side. At this point, it was noted that the hulka uterine manupulater was partially visible coming through the lower cervix at the junction of the cervix and vagina. Cystoscopy was done prior to removing it to ensure there was not bladder injury. Bladder appeared completely normal. The uterus was then amputated supracervically with monopolar cautery and the hulka uterine manupulator was  removed vaginally.The uterus was left in the right upper gutter for later removal. The right and left ureters were well visualized during the entire procedure.      Attention was then placed to the sacrocolpopexy portion of the procedure. The peritoneum overlying the sacral promontory was then opened while care was taken to visualize the right ureter peristalising away from the sacral promonitory. The sigmoid epiploica was grasped with  prosgrasp to get the bowel out of the sacral gutter.The peritoneum was first opened down to the cul-de-sac, keeping the colon on the left and the ureter on the right. The presacral dissection was completed using monopolar scissors . A 2 inch jennifer was then placed in the vagina for better posterior retraction and to facilitate the posterior dissection to the rectal reflection. The peritoneal gutter was developed to the level of the vagina on the left and was connected to the previously dissected presecral peritoneum. An EEA sizer was used in the vagina for better delineation and anterior dissection was done taking the bladder off of the anterior vaginal wall using monopolar scissors. Vagina was noted to be well vasularized. Two pieces of Restorelle mesh were cut to 4 cm width for the posterior and anterior vaginal wall. Posteriorly, 4 pairs of 0 PDS were placed to anchor the mesh along the posterior length of the vagina for approximately 6 cm length.The mesh was then brought up to the level of the sacrum to restore the vagina to normal positioning with out tension. The anterior mesh was then anchored similarly to the anterior vaginal wall with 4 pairs of 0 PDS for approximately 6 cm. The two mesh pieces were then laid on the sacral promontory with minimal tension and attached with two interrupted 0-prolene sutures.     The peritoneum was closed with stratifix suture in a running fashion. The Previously amputated left ovary was was then removed using 5 mm endocatch bag under direct visualization. Some brown chocolate cyst fluid noted in the endocatch back upon removal.  The ovary was sent to Pathology for frozen with multiple benign cysts as the finding. The uterus and tubes was similarly removed with 10 mm endocatch bag under direct visualiztion.     The large assist port was closed using the Aidan Guzman device with 0 Polysorb under direct laperascopic visualization. The umbelical trocar was removed and the  fascia closed in a running fashion with 0-polysorb sutures. The skin incisions were all   closed with subq stitches of 4-0 monocryl and skin glue placed. Dressing placed.     Cystourethroscopy was performed with 70 degree rigid cystoscope at the end of the procedure which showed normal bladder and urethral mucosa, as well as brisk bilateral ureteric efflux. No foreign bodies or lesions in the bladder      Patient tolerated the procedure well and was taken to recovery area in stable condition. All instrument counts were correct.

## 2023-11-02 NOTE — ANESTHESIA PREPROCEDURE EVALUATION
Anesthesia Pre-Procedure Evaluation    Patient: Germania Hayden   MRN: [id number] : 1980        Procedure : Procedure(s):  HYSTERECTOMY, SUPRACERVICAL, ROBOT-ASSISTED, LAPAROSCOPIC, Bilateral salpingectomy, SACROCOLPOPEXY, ROBOT-ASSISTED, WITH CYSTOSCOPY  COLPORRHAPHY, POSTERIOR          Past Medical History:   Diagnosis Date    ASCUS favor benign 2011    neg HPV, repeat Pap + HPV cotesting in 3 years    Chickenpox     Depression     Eating disorder     bulemia- age 18 to 32    Motion sickness     PONV (postoperative nausea and vomiting)     Spider veins 5-6 years ago    Uterovaginal prolapse 10/27/2017      Past Surgical History:   Procedure Laterality Date    ARTHROSCOPIC RECONSTRUCTION ANTERIOR CRUCIATE LIGAMENT  x3    twice on L knee and once on Rt knee    ESOPHAGOSCOPY, GASTROSCOPY, DUODENOSCOPY (EGD), COMBINED N/A 10/10/2019    Procedure: ESOPHAGOGASTRODUODENOSCOPY, WITH BIOPSY;  Surgeon: Shirlene Wilson MD;  Location: [facility] OR    MAMMOPLASTY AUGMENTATION Bilateral     wisdom teeth      ZZC BREAST AUGMENTATION        No Known Allergies   Social History     Tobacco Use    Smoking status: Never    Smokeless tobacco: Never   Substance Use Topics    Alcohol use: Yes     Comment: rare      Wt Readings from Last 1 Encounters:   23 56.8 kg (125 lb 3.2 oz)        Anesthesia Evaluation   Pt has had prior anesthetic.     History of anesthetic complications  - PONV.      ROS/MED HX  ENT/Pulmonary:    (-) tobacco use, asthma and sleep apnea   Neurologic:       Cardiovascular:       METS/Exercise Tolerance:     Hematologic:       Musculoskeletal:       GI/Hepatic:    (-) GERD   Renal/Genitourinary:       Endo:    (-) Type II DM and thyroid disease   Psychiatric/Substance Use:     (+) psychiatric history depression       Infectious Disease:       Malignancy:       Other:            Physical Exam    Airway        Mallampati: II   TM distance: > 3 FB   Neck ROM: full   Mouth opening: > 3  "cm    Respiratory Devices and Support         Dental       (+) Completely normal teeth      Cardiovascular   cardiovascular exam normal          Pulmonary   pulmonary exam normal                OUTSIDE LABS:  CBC:   Lab Results   Component Value Date    WBC 6.6 10/06/2023    WBC 9.1 05/30/2022    HGB 12.7 10/06/2023    HGB 13.2 05/30/2022    HCT 39.9 10/06/2023    HCT 40.8 05/30/2022     10/06/2023     05/30/2022     BMP:   Lab Results   Component Value Date     10/06/2023     05/30/2022    POTASSIUM 4.1 10/06/2023    POTASSIUM 3.8 05/30/2022    CHLORIDE 102 10/06/2023    CHLORIDE 108 05/30/2022    CO2 25 10/06/2023    CO2 27 05/30/2022    BUN 19.3 10/06/2023    BUN 20 05/30/2022    CR 0.77 10/06/2023    CR 0.73 05/30/2022    GLC 83 10/06/2023    GLC 90 05/30/2022     COAGS: No results found for: \"PTT\", \"INR\", \"FIBR\"  POC:   Lab Results   Component Value Date    HCG Negative 11/02/2023    HCGS Negative 08/19/2019     HEPATIC:   Lab Results   Component Value Date    ALBUMIN 4.2 08/19/2019    PROTTOTAL 7.9 08/19/2019    ALT 21 08/19/2019    AST 12 08/19/2019    ALKPHOS 42 08/19/2019    BILITOTAL 0.6 08/19/2019     OTHER:   Lab Results   Component Value Date    CHERIE 9.7 10/06/2023    LIPASE 102 08/19/2019    TSH 0.97 10/06/2023       Anesthesia Plan    ASA Status:  2       Anesthesia Type: General.     - Airway: ETT   Induction: Intravenous.   Maintenance: Balanced.   Techniques and Equipment:     - Lines/Monitors: 2nd IV     Consents    Anesthesia Plan(s) and associated risks, benefits, and realistic alternatives discussed. Questions answered and patient/representative(s) expressed understanding.     - Discussed:     - Discussed with:  Patient            Postoperative Care    Pain management: IV analgesics, Oral pain medications.   PONV prophylaxis: Ondansetron (or other 5HT-3), Dexamethasone or Solumedrol, Background Propofol Infusion, Scopolamine patch     Comments:                Agata " Brady

## 2023-11-02 NOTE — BRIEF OP NOTE
[facility]  Urogynecology  Brief Operative Note      Please page Lori Carroll MD if any questions or concerns: [phone]    Name: Germania Hayden  MRN: [id number]  : 1980  Date of surgery: 2023    Preoperative diagnosis:  - Uterovaginal prolapse; cystocele, midline; rectocele    Postoperative diagnosis:  - Same, s/p below-stated procedure(s)    Procedure(s):  - Robot-assisted laparoscopic supracervical hysterectomy, bilateral salpingectomy, left oophorectomy, sacrocolpopexy; cystoscopy    Surgeon: Amy Eubanks MD  Resident: Lori Carroll MD, PGY-4; Yelena Lowe MD, PGY-2    Anesthesia: GETA with local  EBL: 15cc  Urine output: 300cc pyridium-stained urine  IV fluids: 1300cc crystalloid  Antibiotics: 2g Ancef x2 (4g total due to duration of case)    Specimens:  ID Type Source Tests Collected by Time Destination   1 : Left Ovary with cyst Tissue Ovary, Left SURGICAL PATHOLOGY EXAM Amy Eubanks MD 2023  4:05 PM    2 :  Tissue Uterus, Bilateral Fallopian Tubes SURGICAL PATHOLOGY EXAM Amy Eubanks MD 2023  4:14 PM      Implants:   Implant Name Type Inv. Item Serial No.  Lot No. LRB No. Used Action   MESH SLING FLAT RESTORELLE [id number] - [id number] Mesh MESH SLING FLAT RESTORELLE [id number]  COLOPLAST [id number] N/A 1 Implanted       Findings: Exam under anesthesia revealed small, mobile uterus, patulous cervix. On laparoscopy, no entry injury apparent; liver edge, stomach, bowels appear normal. Uterus, bilateral fallopian tubes, and right ovary normal appearing. Left ovary with 5-cm dark, fluid-filled cyst with dark brown fluid appreciated once removed from abdomen. On cystoscopy at end of case, bladder intact without sign of injury, bilateral ureteral jets actively effluxing pyridium-stained urine. Surgical sites hemostatic at end of case.     Complications: None apparent    Yelena Lowe MD  Obstetrics & Gynecology, PGY-2  2023 4:48 PM

## 2023-11-02 NOTE — ANESTHESIA CARE TRANSFER NOTE
Patient: Germania Hayden    Procedure: Procedure(s):  HYSTERECTOMY, SUPRACERVICAL, ROBOT-ASSISTED, LAPAROSCOPIC, Bilateral salpingectomy, SACROCOLPOPEXY, ROBOT-ASSISTED, WITH CYSTOSCOPY       Diagnosis: Uterovaginal prolapse [N81.4]  Cystocele, midline [N81.11]  Rectocele [N81.6]  Diagnosis Additional Information: No value filed.    Anesthesia Type:   General     Note:    Oropharynx: oropharynx clear of all foreign objects and spontaneously breathing  Level of Consciousness: awake  Oxygen Supplementation: face mask  Level of Supplemental Oxygen (L/min / FiO2): 6  Independent Airway: airway patency satisfactory and stable  Dentition: dentition unchanged  Vital Signs Stable: post-procedure vital signs reviewed and stable  Report to RN Given: handoff report given  Patient transferred to: PACU    Handoff Report: Identifed the Patient, Identified the Reponsible Provider, Reviewed the pertinent medical history, Discussed the surgical course, Reviewed Intra-OP anesthesia mangement and issues during anesthesia, Set expectations for post-procedure period and Allowed opportunity for questions and acknowledgement of understanding      Vitals:  Vitals Value Taken Time   /64 11/02/23 1648   Temp 97.6 F    Pulse 89 11/02/23 1652   Resp 14 11/02/23 1652   SpO2 100 % 11/02/23 1652   Vitals shown include unfiled device data.    Electronically Signed By: WILLIAMS Salguero CRNA  November 2, 2023  4:53 PM

## 2023-11-02 NOTE — ANESTHESIA PROCEDURE NOTES
Airway         Procedure Start/Stop Times: 11/2/2023 11:59 AM  Staff -        Anesthesiologist:  Agata Abreu       CRNA: Lashanda Royal APRN CRNA       Performed By: CRNA  Consent for Airway        Urgency: elective  Indications and Patient Condition       Indications for airway management: kaitlynn-procedural       Induction type:intravenous       Mask difficulty assessment: 1 - vent by mask    Final Airway Details       Final airway type: endotracheal airway       Successful airway: ETT - single  Endotracheal Airway Details        ETT size (mm): 7.0       Cuffed: yes       Successful intubation technique: direct laryngoscopy       DL Blade Type: Vela 2       Grade View of Cords: 1       Adjucts: stylet       Position: Right       Measured from: gums/teeth       Secured at (cm): 22       Bite block used: None    Post intubation assessment        Placement verified by: capnometry, equal breath sounds and chest rise        Number of attempts at approach: 1       Number of other approaches attempted: 0       Secured with: plastic tape       Ease of procedure: easy       Dentition: Intact and Unchanged    Medication(s) Administered   Medication Administration Time: 11/2/2023 11:59 AM

## 2023-11-02 NOTE — OR NURSING
300 ml sterile saline instilled into bladder via hall catheter using sterile technique. Patient then voided in restroom 300 ml in hat.

## 2023-11-03 ASSESSMENT — ACTIVITIES OF DAILY LIVING (ADL)
ADLS_ACUITY_SCORE: 35

## 2023-11-03 NOTE — ANESTHESIA POSTPROCEDURE EVALUATION
Patient: Germania Hayden    Procedure: Procedure(s):  HYSTERECTOMY, SUPRACERVICAL, ROBOT-ASSISTED, LAPAROSCOPIC, Bilateral salpingectomy, SACROCOLPOPEXY, ROBOT-ASSISTED, WITH CYSTOSCOPY       Anesthesia Type:  General    Note:     Postop Pain Control: Uneventful            Sign Out: Well controlled pain   PONV: No   Neuro/Psych: Uneventful            Sign Out: Acceptable/Baseline neuro status   Airway/Respiratory: Uneventful            Sign Out: Acceptable/Baseline resp. status   CV/Hemodynamics: Uneventful            Sign Out: Acceptable CV status; No obvious hypovolemia; No obvious fluid overload   Other NRE: NONE   DID A NON-ROUTINE EVENT OCCUR? No           Last vitals:  Vitals Value Taken Time   /75 11/02/23 1805   Temp 36.3  C (97.3  F) 11/02/23 1805   Pulse 73 11/02/23 1804   Resp 16 11/02/23 1805   SpO2 99 % 11/02/23 1811   Vitals shown include unfiled device data.    Electronically Signed By: Eris Cheung MD  November 2, 2023  7:29 PM

## 2023-11-04 ASSESSMENT — ACTIVITIES OF DAILY LIVING (ADL)
ADLS_ACUITY_SCORE: 35

## 2023-11-05 ENCOUNTER — TELEPHONE (OUTPATIENT)
Dept: OBGYN | Facility: CLINIC | Age: 43
End: 2023-11-05
Payer: COMMERCIAL

## 2023-11-05 ASSESSMENT — ACTIVITIES OF DAILY LIVING (ADL)
ADLS_ACUITY_SCORE: 35

## 2023-11-05 NOTE — TELEPHONE ENCOUNTER
Pt called after hours line. Germania is concerned that she has not had any bleeding of discharge from her vagina since her surgery. She also reports that her abdomen is swollen in various spots to include her periumbilical incision. She is concerned about intra-abdominal bleeding. She says that her heart is not racing and she is not light headed. I reassured her that these are all normal post-op symptoms. I asked her to call if anything changes.  James Eduardo MD  Professor, OB/GYN  Urogynecologist

## 2023-11-06 LAB
PATH REPORT.COMMENTS IMP SPEC: NORMAL
PATH REPORT.COMMENTS IMP SPEC: NORMAL
PATH REPORT.FINAL DX SPEC: NORMAL
PATH REPORT.GROSS SPEC: NORMAL
PATH REPORT.INTRAOP OBS SPEC DOC: NORMAL
PATH REPORT.MICROSCOPIC SPEC OTHER STN: NORMAL
PATH REPORT.RELEVANT HX SPEC: NORMAL
PHOTO IMAGE: NORMAL

## 2023-11-06 ASSESSMENT — ACTIVITIES OF DAILY LIVING (ADL)
ADLS_ACUITY_SCORE: 35

## 2023-11-28 ENCOUNTER — VIRTUAL VISIT (OUTPATIENT)
Dept: UROLOGY | Facility: CLINIC | Age: 43
End: 2023-11-28
Attending: OBSTETRICS & GYNECOLOGY
Payer: COMMERCIAL

## 2023-11-28 VITALS — HEIGHT: 65 IN | BODY MASS INDEX: 20.49 KG/M2 | WEIGHT: 123 LBS

## 2023-11-28 DIAGNOSIS — Z09 POSTOPERATIVE FOLLOW-UP: Primary | ICD-10-CM

## 2023-11-28 PROCEDURE — 99024 POSTOP FOLLOW-UP VISIT: CPT | Mod: VID | Performed by: OBSTETRICS & GYNECOLOGY

## 2023-11-28 ASSESSMENT — PAIN SCALES - GENERAL: PAINLEVEL: NO PAIN (0)

## 2023-11-28 NOTE — PROGRESS NOTES
Virtual Visit Details    Type of service:  Video Visit     Originating Location (pt. Location): Home    Distant Location (provider location):  On-site  Platform used for Video Visit: Andi      Ms Hayden is here for video visit for her post op appointment s/p Robot-assisted laparoscopic supracervical hysterectomy, bilateral salpingectomy, left oophorectomy, sacrocolpopexy; cystoscopy on 11.2.23    Subjective: No incisional pain but does have some low back pain. Her energy is getting a lot better. Says until a week ago, she was doing ok but in the last week, she has been having some pressure in the pelvic area and she is concerned may be the prolapse is  back . She says it started after she did 20 squats. Also some low back pain on and off towards the end of the day. No vaginal bleeding or abnormal discharge. No defecatory or voiding issues     Pathology report:   A.  Left ovary, left salpingo-oophorectomy:  -- Benign ovary with endometriosis with endometriotic cyst, multiple cystic follicles, and hemorrhagic corpus luteal cyst.  -- Benign fallopian tube with paratubal endometriosis.     B.  Uterus, right fallopian tube, supracervical hysterectomy with right salpingectomy:  -- Secretory phase endometrium.    -- Subserosal endometriosis.  -- Myometrium and fallopian tube with no significant histopathologic change.       Assessment and plan  Encounter Diagnosis   Name Primary?    Postoperative follow-up Yes     Concerned that she may have torn something when she did squats a few weeks ago . Discussed that I will evaluate her with pelvic exam when she comes for her in person visit but that for now, to avoid any pressure on the pelvic area. Also can start doing kegel exercises.

## 2023-11-28 NOTE — NURSING NOTE
Is the patient currently in the state of [place]? YES    Visit mode:VIDEO    If the visit is dropped, the patient can be reconnected by: VIDEO VISIT: Text to cell phone:   Telephone Information:   Mobile [phone]       Will anyone else be joining the visit? NO  (If patient encounters technical issues they should call [phone]    How would you like to obtain your AVS? MyChart    Are changes needed to the allergy or medication list? Pt stated no changes to allergies and Pt stated no med changes    Reason for visit: PATRICIA SUAZO

## 2023-11-28 NOTE — LETTER
11/28/2023       RE: Germania Hayden  [address]  [place] [address]     Dear Colleague,    Thank you for referring your patient, Germania Hayden, to the [facility] [place] [facility]. Please see a copy of my visit note below.    Virtual Visit Details    Type of service:  Video Visit     Originating Location (pt. Location): Home    Distant Location (provider location):  On-site  Platform used for Video Visit: Andi      Ms Hayden is here for video visit for her post op appointment s/p Robot-assisted laparoscopic supracervical hysterectomy, bilateral salpingectomy, left oophorectomy, sacrocolpopexy; cystoscopy on 11.2.23    Subjective: No incisional pain but does have some low back pain. Her energy is getting a lot better. Says until a week ago, she was doing ok but in the last week, she has been having some pressure in the pelvic area and she is concerned may be the prolapse is  back . She says it started after she did 20 squats. Also some low back pain on and off towards the end of the day. No vaginal bleeding or abnormal discharge. No defecatory or voiding issues     Pathology report:   A.  Left ovary, left salpingo-oophorectomy:  -- Benign ovary with endometriosis with endometriotic cyst, multiple cystic follicles, and hemorrhagic corpus luteal cyst.  -- Benign fallopian tube with paratubal endometriosis.     B.  Uterus, right fallopian tube, supracervical hysterectomy with right salpingectomy:  -- Secretory phase endometrium.    -- Subserosal endometriosis.  -- Myometrium and fallopian tube with no significant histopathologic change.       Assessment and plan  Encounter Diagnosis   Name Primary?    Postoperative follow-up Yes     Concerned that she may have torn something when she did squats a few weeks ago . Discussed that I will evaluate her with pelvic exam when she comes for her in person visit but that for now,  to avoid any pressure on the pelvic area. Also can start doing kegel exercises.       Amy Eubanks MD

## 2023-12-19 ENCOUNTER — OFFICE VISIT (OUTPATIENT)
Dept: UROLOGY | Facility: CLINIC | Age: 43
End: 2023-12-19
Attending: OBSTETRICS & GYNECOLOGY
Payer: COMMERCIAL

## 2023-12-19 VITALS
SYSTOLIC BLOOD PRESSURE: 109 MMHG | TEMPERATURE: 98.2 F | HEIGHT: 65 IN | HEART RATE: 93 BPM | BODY MASS INDEX: 20.79 KG/M2 | WEIGHT: 124.8 LBS | DIASTOLIC BLOOD PRESSURE: 73 MMHG

## 2023-12-19 DIAGNOSIS — Z09 POSTOPERATIVE FOLLOW-UP: Primary | ICD-10-CM

## 2023-12-19 PROCEDURE — 99024 POSTOP FOLLOW-UP VISIT: CPT | Performed by: OBSTETRICS & GYNECOLOGY

## 2023-12-19 PROCEDURE — 99213 OFFICE O/P EST LOW 20 MIN: CPT | Performed by: OBSTETRICS & GYNECOLOGY

## 2023-12-19 ASSESSMENT — PAIN SCALES - GENERAL: PAINLEVEL: NO PAIN (0)

## 2023-12-19 NOTE — PROGRESS NOTES
"Ms Hayden is here for her post op visit s/p Robot-assisted laparoscopic supracervical hysterectomy, bilateral salpingectomy, left oophorectomy, sacrocolpopexy; cystoscopy on 11.2.23      S: Doing well overall. No bulge symptoms today. No vaginal bleeding or abnormal discharge. Denies voiding or defecatory issues. No dysuria or incontinence.       O: /73   Pulse 93   Temp 98.2  F (36.8  C) (Oral)   Ht 1.651 m (5' 5\")   Wt 56.6 kg (124 lb 12.8 oz)   LMP 09/15/2023 (Approximate)   BMI 20.77 kg/m      Abd incision sites appear Clean/Dry /intact.   Pelvic exam: Normal support, no mesh exposure, no bleeding or abnormal discharge      Assessment and plan  Encounter Diagnosis   Name Primary?    Postoperative follow-up Yes     Doing well overall  Discussed pelvic floor exercises, resume normal activity  Follow up with me in 1 year for pelvic exam  "

## 2023-12-19 NOTE — PATIENT INSTRUCTIONS
Thank you for trusting us with your care!     If you need to contact us for questions about:  Symptoms, Scheduling & Medical Questions; Non-urgent (2-3 day response) Fabien message, Urgent (needing response today) [phone] (if after 3:30pm next day response)   Prescriptions: Please call your Pharmacy   Billing: Marci [phone] or [facility]:[phone]

## 2023-12-19 NOTE — LETTER
"12/19/2023       RE: Germania Hayden  [address]  [place] [address]     Dear Colleague,    Thank you for referring your patient, Germania Hayden, to the [facility] [place] at [facility]. Please see a copy of my visit note below.    Ms Hayden is here for her post op visit s/p Robot-assisted laparoscopic supracervical hysterectomy, bilateral salpingectomy, left oophorectomy, sacrocolpopexy; cystoscopy on 11.2.23      S: Doing well overall. No bulge symptoms today. No vaginal bleeding or abnormal discharge. Denies voiding or defecatory issues. No dysuria or incontinence.       O: /73   Pulse 93   Temp 98.2  F (36.8  C) (Oral)   Ht 1.651 m (5' 5\")   Wt 56.6 kg (124 lb 12.8 oz)   LMP 09/15/2023 (Approximate)   BMI 20.77 kg/m      Abd incision sites appear Clean/Dry /intact.   Pelvic exam: Normal support, no mesh exposure, no bleeding or abnormal discharge      Assessment and plan  Encounter Diagnosis   Name Primary?    Postoperative follow-up Yes     Doing well overall  Discussed pelvic floor exercises, resume normal activity  Follow up with me in 1 year for pelvic exam      Amy Eubanks MD    "

## 2024-02-17 ENCOUNTER — HEALTH MAINTENANCE LETTER (OUTPATIENT)
Age: 44
End: 2024-02-17

## 2024-02-27 ENCOUNTER — OFFICE VISIT (OUTPATIENT)
Dept: DERMATOLOGY | Facility: CLINIC | Age: 44
End: 2024-02-27
Payer: COMMERCIAL

## 2024-02-27 DIAGNOSIS — D23.9 DERMATOFIBROMA: ICD-10-CM

## 2024-02-27 DIAGNOSIS — L81.4 LENTIGO: ICD-10-CM

## 2024-02-27 DIAGNOSIS — D23.9 DERMAL NEVUS: ICD-10-CM

## 2024-02-27 DIAGNOSIS — Z87.898 HISTORY OF ATYPICAL NEVUS: Primary | ICD-10-CM

## 2024-02-27 DIAGNOSIS — D18.01 ANGIOMA OF SKIN: ICD-10-CM

## 2024-02-27 DIAGNOSIS — D22.9 MULTIPLE BENIGN NEVI: ICD-10-CM

## 2024-02-27 PROCEDURE — 99213 OFFICE O/P EST LOW 20 MIN: CPT | Performed by: DERMATOLOGY

## 2024-02-27 NOTE — PROGRESS NOTES
Germania Hayden is an extremely pleasant 43 year old year old female patient here today for hx of atypical nevus.  Pt. has no other skin complaints today.  Remainder of the HPI, Meds, PMH, Allergies, FH, and SH was reviewed in chart.      Past Medical History:   Diagnosis Date    ASCUS favor benign 12/2011    neg HPV, repeat Pap + HPV cotesting in 3 years    Chickenpox     Depression     Eating disorder     bulemia- age 18 to 32    Motion sickness     PONV (postoperative nausea and vomiting)     Spider veins 5-6 years ago    Uterovaginal prolapse 10/27/2017       Past Surgical History:   Procedure Laterality Date    ARTHROSCOPIC RECONSTRUCTION ANTERIOR CRUCIATE LIGAMENT  x3    twice on L knee and once on Rt knee    CYSTOSCOPY N/A 11/2/2023    Procedure: Cystoscopy;  Surgeon: mAy Eubanks MD;  Location:  OR    DAVINCI HYSTERECTOMY SUPRACERVICAL, SACROCOLPOPEXY, COMBINED N/A 11/2/2023    Procedure: HYSTERECTOMY, SUPRACERVICAL, ROBOT-ASSISTED, LAPAROSCOPIC, Bilateral salpingectomy, SACROCOLPOPEXY, ROBOT-ASSISTED, WITH CYSTOSCOPY;  Surgeon: Amy Eubanks MD;  Location:  OR    ESOPHAGOSCOPY, GASTROSCOPY, DUODENOSCOPY (EGD), COMBINED N/A 10/10/2019    Procedure: ESOPHAGOGASTRODUODENOSCOPY, WITH BIOPSY;  Surgeon: Shirlene Wilson MD;  Location: [facility] OR    MAMMOPLASTY AUGMENTATION Bilateral 2015    wisdom teeth      ZZC BREAST AUGMENTATION          Family History   Problem Relation Age of Onset    Depression Mother     Colon Polyps Mother     Melanoma Sister     Mental Illness Brother         schizoaffective disorder    Gastrointestinal Disease Brother         ulcerative colitis    Cancer Maternal Grandmother         lymphoma    Cancer Maternal Grandfather         lung ,bone    Cancer Paternal Grandmother 60        ovarian    Breast Cancer Paternal Grandmother 85    Cardiovascular Paternal Grandfather         MI       Social History     Socioeconomic History    Marital status:      Spouse name:  Eris    Number of children: 2    Years of education: Not on file    Highest education level: Not on file   Occupational History     Employer: [employer]   Tobacco Use    Smoking status: Never    Smokeless tobacco: Never   Vaping Use    Vaping Use: Never used   Substance and Sexual Activity    Alcohol use: Yes     Comment: rare    Drug use: No    Sexual activity: Yes     Partners: Male   Other Topics Concern    Parent/sibling w/ CABG, MI or angioplasty before 65F 55M? No   Social History Narrative    Not on file     Social Determinants of Health     Financial Resource Strain: Low Risk  (9/29/2023)    Financial Resource Strain     Within the past 12 months, have you or your family members you live with been unable to get utilities (heat, electricity) when it was really needed?: No   Food Insecurity: Low Risk  (9/29/2023)    Food Insecurity     Within the past 12 months, did you worry that your food would run out before you got money to buy more?: No     Within the past 12 months, did the food you bought just not last and you didn t have money to get more?: No   Transportation Needs: Low Risk  (9/29/2023)    Transportation Needs     Within the past 12 months, has lack of transportation kept you from medical appointments, getting your medicines, non-medical meetings or appointments, work, or from getting things that you need?: No   Physical Activity: Not on file   Stress: Not on file   Social Connections: Not on file   Interpersonal Safety: Low Risk  (10/6/2023)    Interpersonal Safety     Do you feel physically and emotionally safe where you currently live?: Yes     Within the past 12 months, have you been hit, slapped, kicked or otherwise physically hurt by someone?: No     Within the past 12 months, have you been humiliated or emotionally abused in other ways by your partner or ex-partner?: No   Housing Stability: Low Risk  (9/29/2023)    Housing Stability     Do you have housing? : Yes     Are  you worried about losing your housing?: No       Outpatient Encounter Medications as of 2/27/2024   Medication Sig Dispense Refill    acetaminophen (TYLENOL) 325 MG tablet Take 3 tablets (975 mg) by mouth every 6 hours as needed for mild pain (Patient not taking: Reported on 12/19/2023)      acyclovir (ZOVIRAX) 200 MG capsule Take 1 capsule by mouth twice daily 180 capsule 1    buPROPion (WELLBUTRIN XL) 150 MG 24 hr tablet Take 1 tablet (150 mg) by mouth every morning 90 tablet 1    docusate sodium (COLACE) 100 MG capsule Take 1 capsule (100 mg) by mouth 2 times daily 100 capsule 1    hydrOXYzine (ATARAX) 10 MG tablet Take 1 tablet (10 mg) by mouth every 8 hours as needed for anxiety (Patient not taking: Reported on 12/19/2023) 30 tablet 1    ibuprofen (ADVIL/MOTRIN) 800 MG tablet Take 1 tablet (800 mg) by mouth every 6 hours as needed for other (mild and/or inflammatory pain) (Patient not taking: Reported on 12/19/2023)      Multiple Vitamins-Minerals (MULTIVITAMIN PO) Take by mouth daily       No facility-administered encounter medications on file as of 2/27/2024.             O:   NAD, WDWN, Alert & Oriented, Mood & Affect wnl, Vitals stable   General appearance normal   Vitals stable   Alert, oriented and in no acute distress     pigmented macules on trunk and ext with regular borders and pigment networks    Firm papule on leg   Stuck on papules and brown macules on trunk and ext   Red papules on trunk  Flesh colored papules on trunk     The remainder of the full exam was normal; the following areas were examined:  conjunctiva/lids, , neck, peripheral vascular system, abdomen, lymph nodes, digits/nails, eccrine and apocrine glands, scalp/hair, face, neck, chest, abdomen, buttocks, back, RUE, LUE, RLE, LLE       Eyes: Conjunctivae/lids:Normal     ENT: Lips, buccal mucosa, tongue: normal    MSK:Normal    Cardiovascular: peripheral edema none    Pulm: Breathing Normal    Lymph Nodes: No Head and Neck Lymphadenopathy      Neuro/Psych: Orientation:Alert and Orientedx3 ; Mood/Affect:normal       A/P:  1. Seborrheic keratosis, lentigo, angioma, dermal nevus, nevi,hx of atypical nevus, dermatofibroma   It was a pleasure speaking to Germania Hayden today.  Previous clinic notes and pertinent laboratory tests were reviewed prior to Germania Hayden's visit.  Signs and Symptoms of skin cancer discussed with patient.  Patient encouraged to perform monthly skin exams.  UV precautions reviewed with patient.  Return to clinic 12 months

## 2024-02-27 NOTE — LETTER
2/27/2024         RE: Germania Hayden  [address]  [place] [address]        Dear Colleague,    Thank you for referring your patient, Germania Hayden, to the [facility]. Please see a copy of my visit note below.    Germania Hayden is an extremely pleasant 43 year old year old female patient here today for hx of atypical nevus.  Pt. has no other skin complaints today.  Remainder of the HPI, Meds, PMH, Allergies, FH, and SH was reviewed in chart.      Past Medical History:   Diagnosis Date     ASCUS favor benign 12/2011    neg HPV, repeat Pap + HPV cotesting in 3 years     Chickenpox      Depression      Eating disorder     bulemia- age 18 to 32     Motion sickness      PONV (postoperative nausea and vomiting)      Spider veins 5-6 years ago     Uterovaginal prolapse 10/27/2017       Past Surgical History:   Procedure Laterality Date     ARTHROSCOPIC RECONSTRUCTION ANTERIOR CRUCIATE LIGAMENT  x3    twice on L knee and once on Rt knee     CYSTOSCOPY N/A 11/2/2023    Procedure: Cystoscopy;  Surgeon: Amy Eubanks MD;  Location:  OR     DAVINCI HYSTERECTOMY SUPRACERVICAL, SACROCOLPOPEXY, COMBINED N/A 11/2/2023    Procedure: HYSTERECTOMY, SUPRACERVICAL, ROBOT-ASSISTED, LAPAROSCOPIC, Bilateral salpingectomy, SACROCOLPOPEXY, ROBOT-ASSISTED, WITH CYSTOSCOPY;  Surgeon: Amy Eubanks MD;  Location:  OR     ESOPHAGOSCOPY, GASTROSCOPY, DUODENOSCOPY (EGD), COMBINED N/A 10/10/2019    Procedure: ESOPHAGOGASTRODUODENOSCOPY, WITH BIOPSY;  Surgeon: Shirlene Wilson MD;  Location: [facility]     MAMMOPLASTY AUGMENTATION Bilateral 2015     wisdom teeth       ZZC BREAST AUGMENTATION          Family History   Problem Relation Age of Onset     Depression Mother      Colon Polyps Mother      Melanoma Sister      Mental Illness Brother         schizoaffective disorder     Gastrointestinal Disease Brother         ulcerative colitis     Cancer Maternal Grandmother         lymphoma      Cancer Maternal Grandfather         lung ,bone     Cancer Paternal Grandmother 60        ovarian     Breast Cancer Paternal Grandmother 85     Cardiovascular Paternal Grandfather         MI       Social History     Socioeconomic History     Marital status:      Spouse name: Eris     Number of children: 2     Years of education: Not on file     Highest education level: Not on file   Occupational History     Employer: [employer]   Tobacco Use     Smoking status: Never     Smokeless tobacco: Never   Vaping Use     Vaping Use: Never used   Substance and Sexual Activity     Alcohol use: Yes     Comment: rare     Drug use: No     Sexual activity: Yes     Partners: Male   Other Topics Concern     Parent/sibling w/ CABG, MI or angioplasty before 65F 55M? No   Social History Narrative     Not on file     Social Determinants of Health     Financial Resource Strain: Low Risk  (9/29/2023)    Financial Resource Strain      Within the past 12 months, have you or your family members you live with been unable to get utilities (heat, electricity) when it was really needed?: No   Food Insecurity: Low Risk  (9/29/2023)    Food Insecurity      Within the past 12 months, did you worry that your food would run out before you got money to buy more?: No      Within the past 12 months, did the food you bought just not last and you didn t have money to get more?: No   Transportation Needs: Low Risk  (9/29/2023)    Transportation Needs      Within the past 12 months, has lack of transportation kept you from medical appointments, getting your medicines, non-medical meetings or appointments, work, or from getting things that you need?: No   Physical Activity: Not on file   Stress: Not on file   Social Connections: Not on file   Interpersonal Safety: Low Risk  (10/6/2023)    Interpersonal Safety      Do you feel physically and emotionally safe where you currently live?: Yes      Within the past 12 months, have  you been hit, slapped, kicked or otherwise physically hurt by someone?: No      Within the past 12 months, have you been humiliated or emotionally abused in other ways by your partner or ex-partner?: No   Housing Stability: Low Risk  (9/29/2023)    Housing Stability      Do you have housing? : Yes      Are you worried about losing your housing?: No       Outpatient Encounter Medications as of 2/27/2024   Medication Sig Dispense Refill     acetaminophen (TYLENOL) 325 MG tablet Take 3 tablets (975 mg) by mouth every 6 hours as needed for mild pain (Patient not taking: Reported on 12/19/2023)       acyclovir (ZOVIRAX) 200 MG capsule Take 1 capsule by mouth twice daily 180 capsule 1     buPROPion (WELLBUTRIN XL) 150 MG 24 hr tablet Take 1 tablet (150 mg) by mouth every morning 90 tablet 1     docusate sodium (COLACE) 100 MG capsule Take 1 capsule (100 mg) by mouth 2 times daily 100 capsule 1     hydrOXYzine (ATARAX) 10 MG tablet Take 1 tablet (10 mg) by mouth every 8 hours as needed for anxiety (Patient not taking: Reported on 12/19/2023) 30 tablet 1     ibuprofen (ADVIL/MOTRIN) 800 MG tablet Take 1 tablet (800 mg) by mouth every 6 hours as needed for other (mild and/or inflammatory pain) (Patient not taking: Reported on 12/19/2023)       Multiple Vitamins-Minerals (MULTIVITAMIN PO) Take by mouth daily       No facility-administered encounter medications on file as of 2/27/2024.             O:   NAD, WDWN, Alert & Oriented, Mood & Affect wnl, Vitals stable   General appearance normal   Vitals stable   Alert, oriented and in no acute distress     pigmented macules on trunk and ext with regular borders and pigment networks    Firm papule on leg   Stuck on papules and brown macules on trunk and ext   Red papules on trunk  Flesh colored papules on trunk     The remainder of the full exam was normal; the following areas were examined:  conjunctiva/lids, , neck, peripheral vascular system, abdomen, lymph nodes, digits/nails,  eccrine and apocrine glands, scalp/hair, face, neck, chest, abdomen, buttocks, back, RUE, LUE, RLE, LLE       Eyes: Conjunctivae/lids:Normal     ENT: Lips, buccal mucosa, tongue: normal    MSK:Normal    Cardiovascular: peripheral edema none    Pulm: Breathing Normal    Lymph Nodes: No Head and Neck Lymphadenopathy     Neuro/Psych: Orientation:Alert and Orientedx3 ; Mood/Affect:normal       A/P:  1. Seborrheic keratosis, lentigo, angioma, dermal nevus, nevi,hx of atypical nevus, dermatofibroma   It was a pleasure speaking to Germania Hayden today.  Previous clinic notes and pertinent laboratory tests were reviewed prior to Germania Hayden's visit.  Signs and Symptoms of skin cancer discussed with patient.  Patient encouraged to perform monthly skin exams.  UV precautions reviewed with patient.  Return to clinic 12 months      Again, thank you for allowing me to participate in the care of your patient.        Sincerely,        Eris Etienne MD

## 2024-03-22 ENCOUNTER — VIRTUAL VISIT (OUTPATIENT)
Dept: FAMILY MEDICINE | Facility: CLINIC | Age: 44
End: 2024-03-22
Payer: COMMERCIAL

## 2024-03-22 DIAGNOSIS — Z12.31 ENCOUNTER FOR SCREENING MAMMOGRAM FOR BREAST CANCER: ICD-10-CM

## 2024-03-22 DIAGNOSIS — F33.1 MODERATE RECURRENT MAJOR DEPRESSION (H): Primary | ICD-10-CM

## 2024-03-22 PROCEDURE — 99213 OFFICE O/P EST LOW 20 MIN: CPT | Mod: 95 | Performed by: NURSE PRACTITIONER

## 2024-03-22 RX ORDER — BUPROPION HYDROCHLORIDE 150 MG/1
150 TABLET ORAL EVERY MORNING
Qty: 90 TABLET | Refills: 3 | Status: SHIPPED | OUTPATIENT
Start: 2024-03-22 | End: 2024-04-30

## 2024-03-22 ASSESSMENT — ANXIETY QUESTIONNAIRES
GAD7 TOTAL SCORE: 6
6. BECOMING EASILY ANNOYED OR IRRITABLE: SEVERAL DAYS
GAD7 TOTAL SCORE: 6
3. WORRYING TOO MUCH ABOUT DIFFERENT THINGS: SEVERAL DAYS
1. FEELING NERVOUS, ANXIOUS, OR ON EDGE: SEVERAL DAYS
7. FEELING AFRAID AS IF SOMETHING AWFUL MIGHT HAPPEN: NOT AT ALL
2. NOT BEING ABLE TO STOP OR CONTROL WORRYING: SEVERAL DAYS
5. BEING SO RESTLESS THAT IT IS HARD TO SIT STILL: SEVERAL DAYS

## 2024-03-22 ASSESSMENT — PATIENT HEALTH QUESTIONNAIRE - PHQ9
5. POOR APPETITE OR OVEREATING: SEVERAL DAYS
SUM OF ALL RESPONSES TO PHQ QUESTIONS 1-9: 5
SUM OF ALL RESPONSES TO PHQ QUESTIONS 1-9: 5

## 2024-03-22 NOTE — PROGRESS NOTES
Germania is a 43 year old who is being evaluated via a billable video visit.    How would you like to obtain your AVS? MyChart  If the video visit is dropped, the invitation should be resent by: Text to cell phone: [phone]  Will anyone else be joining your video visit? No      Assessment & Plan     Moderate recurrent major depression (H)  Restarted Wellbutrin and doing well. Discussed possibly taking it seasonally given symptoms.      - buPROPion (WELLBUTRIN XL) 150 MG 24 hr tablet  Dispense: 90 tablet; Refill: 3    Encounter for screening mammogram for breast cancer     - MA Screen Bilateral w/Caesar           See Patient Instructions    Subjective   Germania is a 43 year old, presenting for the following health issues:  Depression        3/22/2024    12:54 PM   Additional Questions   Roomed by Nataliia SIMMONS MA   Accompanied by Self     History of Present Illness       Mental Health Follow-up:  Patient presents to follow-up on Depression.Patient's depression since last visit has been:  Medium  The patient is not having other symptoms associated with depression.      Any significant life events: No  Patient is not feeling anxious or having panic attacks.  Patient has no concerns about alcohol or drug use.    She eats 2-3 servings of fruits and vegetables daily.She consumes 0 sweetened beverage(s) daily.She exercises with enough effort to increase her heart rate 20 to 29 minutes per day.  She exercises with enough effort to increase her heart rate 4 days per week.   She is taking medications regularly.         11/7/2022     9:29 PM 9/7/2023     6:14 PM 3/22/2024    12:53 PM   PHQ   PHQ-9 Total Score 13 6 5   Q9: Thoughts of better off dead/self-harm past 2 weeks Not at all Not at all Not at all         11/7/2022     9:29 PM 5/23/2023     9:51 AM 3/22/2024    12:53 PM   NOAM-7 SCORE   Total Score 13 (moderate anxiety) 10 (moderate anxiety)    Total Score 13 10 6     Notes from visit in May 2023 for depression:  Discussed  reducing dose of Wellbutrin to 150 mg as this may be the cause for increasing anxiety and restlessness. May use Hydroxyzine prn for anxiety.  If not improvement would add daily Lexapro with Wellbutrin 150 or 300 mg depending on response.  Patient stated understanding and will Mychart update.         Review of Systems  Constitutional, neuro, ENT, endocrine, pulmonary, cardiac, gastrointestinal, genitourinary, musculoskeletal, integument and psychiatric systems are negative, except as otherwise noted.      Objective           Vitals:  No vitals were obtained today due to virtual visit.    Physical Exam   GENERAL: alert and no distress  RESP: No audible wheeze, cough, or visible cyanosis.    SKIN: Visible skin clear. No significant rash, abnormal pigmentation or lesions.  NEURO: Cranial nerves grossly intact.  Mentation and speech appropriate for age.  PSYCH: Appropriate affect, tone, and pace of words           Video-Visit Details    Type of service:  Video Visit   Originating Location (pt. Location): Home    Distant Location (provider location):  On-site  Platform used for Video Visit: Andi  Signed Electronically by: Alexandra Hart DNP

## 2024-04-05 DIAGNOSIS — Z86.19 H/O COLD SORES: ICD-10-CM

## 2024-04-05 RX ORDER — ACYCLOVIR 200 MG/1
CAPSULE ORAL
Qty: 180 CAPSULE | Refills: 0 | Status: SHIPPED | OUTPATIENT
Start: 2024-04-05 | End: 2024-06-24

## 2024-04-15 ENCOUNTER — MYC MEDICAL ADVICE (OUTPATIENT)
Dept: FAMILY MEDICINE | Facility: CLINIC | Age: 44
End: 2024-04-15
Payer: COMMERCIAL

## 2024-04-30 ENCOUNTER — MYC MEDICAL ADVICE (OUTPATIENT)
Dept: FAMILY MEDICINE | Facility: CLINIC | Age: 44
End: 2024-04-30
Payer: COMMERCIAL

## 2024-04-30 DIAGNOSIS — F33.1 MODERATE RECURRENT MAJOR DEPRESSION (H): ICD-10-CM

## 2024-04-30 RX ORDER — BUPROPION HYDROCHLORIDE 150 MG/1
300 TABLET ORAL EVERY MORNING
Qty: 180 TABLET | Refills: 0 | Status: SHIPPED | OUTPATIENT
Start: 2024-04-30 | End: 2024-05-28

## 2024-05-08 ENCOUNTER — OFFICE VISIT (OUTPATIENT)
Dept: UROLOGY | Facility: CLINIC | Age: 44
End: 2024-05-08
Attending: OBSTETRICS & GYNECOLOGY
Payer: COMMERCIAL

## 2024-05-08 VITALS
DIASTOLIC BLOOD PRESSURE: 73 MMHG | BODY MASS INDEX: 20.77 KG/M2 | SYSTOLIC BLOOD PRESSURE: 117 MMHG | HEIGHT: 65 IN | HEART RATE: 83 BPM

## 2024-05-08 DIAGNOSIS — N81.6 RECTOCELE: Primary | ICD-10-CM

## 2024-05-08 PROBLEM — N81.4 UTEROVAGINAL PROLAPSE: Status: RESOLVED | Noted: 2017-10-27 | Resolved: 2024-05-08

## 2024-05-08 PROCEDURE — 99213 OFFICE O/P EST LOW 20 MIN: CPT | Performed by: OBSTETRICS & GYNECOLOGY

## 2024-05-08 PROCEDURE — 99214 OFFICE O/P EST MOD 30 MIN: CPT | Performed by: OBSTETRICS & GYNECOLOGY

## 2024-05-08 RX ORDER — BUPROPION HYDROCHLORIDE 300 MG/1
300 TABLET ORAL EVERY MORNING
COMMUNITY
Start: 2024-04-30 | End: 2024-05-28

## 2024-05-08 NOTE — LETTER
"5/8/2024       RE: Germania Hayden  [address]  [place] [address]     Dear Colleague,    Thank you for referring your patient, Germania Hayden, to the [facility] [place] at [facility]. Please see a copy of my visit note below.    Ms Hayden is here for concerns of possible recurrence of prolapse. She is  s/p Robot-assisted laparoscopic supracervical hysterectomy, bilateral salpingectomy, left oophorectomy, sacrocolpopexy; cystoscopy on 11.2.23 ( Surgeon: Cha).  Today she says that she has been feeling some pressure/bulge sensation inside the vagina when she is physically active such as squatting, running or playing soccer with her kids. She is concerned that she having recurrent prolapse and that she may be making it worse by engaging in these activities.     She denies any voiding or defecatory difficulties. No constipation.       Pelvic exam:   /73   Pulse 83   Ht 1.651 m (5' 5\")   LMP 09/15/2023 (Approximate)   BMI 20.77 kg/m      Pelvic exam showed stage 2 rectocele. Normal apical and ant support. No mesh erosion. No abnormal discharge.     POPQ EXAM FOR PROLAPSE SEVERITY  (Aa):   -2 (Ba):   -2 (C ):    -7   (GH):  4 (PB): 2 (TVL):  9   (Ap):   -1 (Bp):  -1 (D):   -8       Assessment and plan   Encounter Diagnosis   Name Primary?    Rectocele Yes     Mild stage 2 rectocele  Symptomatic  We discussed that she can start doing daily kegel exercises ( instructions given and she has done pelvic PT before) as part of her daily routine . Also avoid constipation and add daily fiber in her diet.   If symptoms don't improve in the next 3 months or so, we can consider posterior colporrhaphy to repair the rectocele.   Virtual follow up in 3 months      I spent 30 face-to-face with Germania Hayden on the date of the encounter in chart review, patient visit, review of tests, documentation and/or discussion with other " providers about the issues documented above.       Amy Eubanks MD

## 2024-05-08 NOTE — PROGRESS NOTES
"Ms Hayden is here for concerns of possible recurrence of prolapse. She is  s/p Robot-assisted laparoscopic supracervical hysterectomy, bilateral salpingectomy, left oophorectomy, sacrocolpopexy; cystoscopy on 11.2.23 ( Surgeon: Cha).  Today she says that she has been feeling some pressure/bulge sensation inside the vagina when she is physically active such as squatting, running or playing soccer with her kids. She is concerned that she having recurrent prolapse and that she may be making it worse by engaging in these activities.     She denies any voiding or defecatory difficulties. No constipation.       Pelvic exam:   /73   Pulse 83   Ht 1.651 m (5' 5\")   LMP 09/15/2023 (Approximate)   BMI 20.77 kg/m      Pelvic exam showed stage 2 rectocele. Normal apical and ant support. No mesh erosion. No abnormal discharge.     POPQ EXAM FOR PROLAPSE SEVERITY  (Aa):   -2 (Ba):   -2 (C ):    -7   (GH):  4 (PB): 2 (TVL):  9   (Ap):   -1 (Bp):  -1 (D):   -8       Assessment and plan   Encounter Diagnosis   Name Primary?    Rectocele Yes     Mild stage 2 rectocele  Symptomatic  We discussed that she can start doing daily kegel exercises ( instructions given and she has done pelvic PT before) as part of her daily routine . Also avoid constipation and add daily fiber in her diet.   If symptoms don't improve in the next 3 months or so, we can consider posterior colporrhaphy to repair the rectocele.   Virtual follow up in 3 months      I spent 30 face-to-face with Germania Hayden on the date of the encounter in chart review, patient visit, review of tests, documentation and/or discussion with other providers about the issues documented above.     "

## 2024-05-28 ENCOUNTER — VIRTUAL VISIT (OUTPATIENT)
Dept: FAMILY MEDICINE | Facility: CLINIC | Age: 44
End: 2024-05-28
Payer: COMMERCIAL

## 2024-05-28 DIAGNOSIS — F41.9 ANXIETY: ICD-10-CM

## 2024-05-28 DIAGNOSIS — F33.1 MODERATE RECURRENT MAJOR DEPRESSION (H): ICD-10-CM

## 2024-05-28 DIAGNOSIS — R41.840 DISTURBED CONCENTRATION: Primary | ICD-10-CM

## 2024-05-28 PROCEDURE — 99442 PR PHYSICIAN TELEPHONE EVALUATION 11-20 MIN: CPT | Mod: 93 | Performed by: NURSE PRACTITIONER

## 2024-05-28 RX ORDER — BUPROPION HYDROCHLORIDE 150 MG/1
300 TABLET ORAL EVERY MORNING
Qty: 180 TABLET | Refills: 3 | Status: SHIPPED | OUTPATIENT
Start: 2024-05-28

## 2024-05-28 NOTE — PATIENT INSTRUCTIONS
[facility] will call you to coordinate your care as prescribed by your provider. If you don't hear from a representative within 2 business days, please call [phone].

## 2024-05-28 NOTE — PROGRESS NOTES
"Germania is a 44 year old who is being evaluated via a billable telephone visit.    What phone number would you like to be contacted at? [phone]  How would you like to obtain your AVS? Arnaldo  Originating Location (pt. Location): Home  Distant Location (provider location):  On-site    Assessment & Plan     Disturbed concentration  Referral for testing.    - Adult Mental Health  Referral    Moderate recurrent major depression (H)  Continue medications.  Follow up as needed.    - Adult Mental Health  Referral  - buPROPion (WELLBUTRIN XL) 150 MG 24 hr tablet  Dispense: 180 tablet; Refill: 3    Anxiety       Questioning about weight loss medications - criteria for prescribing.  Patient's BMI < 29 and would not qualify.      Patients questions answered.               See Patient Instructions    Subjective   Germania is a 44 year old, presenting for the following health issues:  Weight Problem (Discuss weight loss meds) and ADD Testing  (Discuss getting ADD testing )      5/28/2024     1:28 PM   Additional Questions   Roomed by Karsten WOLFE CMA   Accompanied by self     History of Present Illness       Reason for visit:  Discuss weight loss drugs and ADDShe consumes 0 sweetened beverage(s) daily.She exercises with enough effort to increase her heart rate 30 to 60 minutes per day.  She exercises with enough effort to increase her heart rate 4 days per week.   She is taking medications regularly.  Reports more recently has concentration (especially in a group of people) and focus concerns and may be contributing to her anxiety.  Feels like she might \"miss\" something.     Daughter has ADHD  PMH moderate depression    Taking Wellbutrin and recently increased to 300 mg from 150 mg XL      Review of Systems  Constitutional, HEENT, cardiovascular, pulmonary, GI, , musculoskeletal, neuro, skin, endocrine and psych systems are negative, except as otherwise noted.      Objective    Vitals - Patient Reported  Pain " Score: No Pain (0)        Physical Exam   General: Alert and no distress //Respiratory: No audible wheeze, cough, or shortness of breath // Psychiatric:  Appropriate affect, tone, and pace of words           Phone call duration: 11 minutes  Signed Electronically by: Alexandra Hart DNP

## 2024-06-20 ENCOUNTER — ANCILLARY PROCEDURE (OUTPATIENT)
Dept: MAMMOGRAPHY | Facility: CLINIC | Age: 44
End: 2024-06-20
Attending: NURSE PRACTITIONER
Payer: COMMERCIAL

## 2024-06-20 DIAGNOSIS — Z12.31 ENCOUNTER FOR SCREENING MAMMOGRAM FOR BREAST CANCER: ICD-10-CM

## 2024-06-20 PROCEDURE — 77063 BREAST TOMOSYNTHESIS BI: CPT | Performed by: RADIOLOGY

## 2024-06-20 PROCEDURE — 77067 SCR MAMMO BI INCL CAD: CPT | Performed by: RADIOLOGY

## 2024-06-24 DIAGNOSIS — Z86.19 H/O COLD SORES: ICD-10-CM

## 2024-06-24 RX ORDER — ACYCLOVIR 200 MG/1
CAPSULE ORAL
Qty: 180 CAPSULE | Refills: 0 | Status: SHIPPED | OUTPATIENT
Start: 2024-06-24 | End: 2024-09-23

## 2024-07-06 ENCOUNTER — HEALTH MAINTENANCE LETTER (OUTPATIENT)
Age: 44
End: 2024-07-06

## 2024-08-20 ENCOUNTER — VIRTUAL VISIT (OUTPATIENT)
Dept: UROLOGY | Facility: CLINIC | Age: 44
End: 2024-08-20
Attending: OBSTETRICS & GYNECOLOGY
Payer: COMMERCIAL

## 2024-08-20 DIAGNOSIS — N81.6 RECTOCELE: Primary | ICD-10-CM

## 2024-08-20 PROCEDURE — 99213 OFFICE O/P EST LOW 20 MIN: CPT | Mod: 95 | Performed by: OBSTETRICS & GYNECOLOGY

## 2024-08-20 NOTE — NURSING NOTE
Current patient location: [id number]  [facility] [place] [facility] [place] [facility] [address]    Is the patient currently in the state of [place]? YES    Visit mode:VIDEO    If the visit is dropped, the patient can be reconnected by: VIDEO VISIT: Text to cell phone:   Telephone Information:   Mobile [phone]       Will anyone else be joining the visit? NO  (If patient encounters technical issues they should call [phone]    How would you like to obtain your AVS? MyChart    Are changes needed to the allergy or medication list? No, Pt stated no changes to allergies, and Pt stated no med changes    Are refills needed on medications prescribed by this physician? NO    Rooming Documentation:  Not applicable      Reason for visit: RECHECK    Cierra SUAZO

## 2024-08-20 NOTE — LETTER
8/20/2024       RE: Germania Hayden  [address]  [facility] [place] [facility] [address]     Dear Colleague,    Thank you for referring your patient, Germania Hayden, to the [facility] [place] [facility]. Please see a copy of my visit note below.    Virtual Visit Details    Type of service:  Video Visit     Originating Location (pt. Location): Home    Distant Location (provider location):  On-site  Platform used for Video Visit: Well    Ms Hayden is here for video visit today to discuss management of her stage 2 rectocele. She is s/p s/p Robot-assisted laparoscopic supracervical hysterectomy, bilateral salpingectomy, left oophorectomy, sacrocolpopexy; cystoscopy on 11.2.23 . I last saw her on 5.8.24 for recurrent pelvic pressure and diagnosed her with a stage 2 rectocele. At that time, the plan was to start doing daily kegel exercises and fiber supplement and see if her symptoms improve. If not, she was going to consider posterior colporrhaphy.     Today she says there hasn't been a big change but she has gotten accustomed to the pressure sensation and it is not affecting her quality of life much. Some Physical activity/exercises makes it worse but always gets better        Assessment and plan  Encounter Diagnosis   Name Primary?     Rectocele Yes     Symptoms are unchanged but she is not too bothered at this point.     She would like to do expectant management for now. Knows to follow up if symptoms get worse.      I spent  15 minutes on video with Germania Hayden on the date of the encounter in chart review, patient visit, review of tests, documentation and/or discussion with other providers about the issues documented above.       Again, thank you for allowing me to participate in the care of your patient.      Sincerely,    Amy Eubanks MD

## 2024-08-20 NOTE — PROGRESS NOTES
Virtual Visit Details    Type of service:  Video Visit     Originating Location (pt. Location): Home    Distant Location (provider location):  On-site  Platform used for Video Visit: Andi    Ms Hayden is here for video visit today to discuss management of her stage 2 rectocele. She is s/p s/p Robot-assisted laparoscopic supracervical hysterectomy, bilateral salpingectomy, left oophorectomy, sacrocolpopexy; cystoscopy on 11.2.23 . I last saw her on 5.8.24 for recurrent pelvic pressure and diagnosed her with a stage 2 rectocele. At that time, the plan was to start doing daily kegel exercises and fiber supplement and see if her symptoms improve. If not, she was going to consider posterior colporrhaphy.     Today she says there hasn't been a big change but she has gotten accustomed to the pressure sensation and it is not affecting her quality of life much. Some Physical activity/exercises makes it worse but always gets better        Assessment and plan  Encounter Diagnosis   Name Primary?    Rectocele Yes     Symptoms are unchanged but she is not too bothered at this point.     She would like to do expectant management for now. Knows to follow up if symptoms get worse.      I spent  15 minutes on video with Germania Hayden on the date of the encounter in chart review, patient visit, review of tests, documentation and/or discussion with other providers about the issues documented above.

## 2024-09-21 DIAGNOSIS — Z86.19 H/O COLD SORES: ICD-10-CM

## 2024-09-23 RX ORDER — ACYCLOVIR 200 MG/1
CAPSULE ORAL
Qty: 180 CAPSULE | Refills: 1 | Status: SHIPPED | OUTPATIENT
Start: 2024-09-23

## 2024-10-04 ENCOUNTER — IMMUNIZATION (OUTPATIENT)
Dept: FAMILY MEDICINE | Facility: CLINIC | Age: 44
End: 2024-10-04
Payer: COMMERCIAL

## 2024-10-04 PROCEDURE — 90471 IMMUNIZATION ADMIN: CPT

## 2024-10-04 PROCEDURE — 90656 IIV3 VACC NO PRSV 0.5 ML IM: CPT

## 2024-10-04 PROCEDURE — 90480 ADMN SARSCOV2 VAC 1/ONLY CMP: CPT

## 2024-10-04 PROCEDURE — 91320 SARSCV2 VAC 30MCG TRS-SUC IM: CPT

## 2025-01-27 SDOH — HEALTH STABILITY: PHYSICAL HEALTH: ON AVERAGE, HOW MANY DAYS PER WEEK DO YOU ENGAGE IN MODERATE TO STRENUOUS EXERCISE (LIKE A BRISK WALK)?: 3 DAYS

## 2025-01-27 SDOH — HEALTH STABILITY: PHYSICAL HEALTH: ON AVERAGE, HOW MANY MINUTES DO YOU ENGAGE IN EXERCISE AT THIS LEVEL?: 30 MIN

## 2025-01-27 ASSESSMENT — SOCIAL DETERMINANTS OF HEALTH (SDOH): HOW OFTEN DO YOU GET TOGETHER WITH FRIENDS OR RELATIVES?: ONCE A WEEK

## 2025-01-30 ENCOUNTER — OFFICE VISIT (OUTPATIENT)
Dept: FAMILY MEDICINE | Facility: CLINIC | Age: 45
End: 2025-01-30
Payer: COMMERCIAL

## 2025-01-30 VITALS
BODY MASS INDEX: 19.29 KG/M2 | HEIGHT: 65 IN | DIASTOLIC BLOOD PRESSURE: 62 MMHG | HEART RATE: 86 BPM | TEMPERATURE: 97 F | RESPIRATION RATE: 16 BRPM | WEIGHT: 115.8 LBS | OXYGEN SATURATION: 99 % | SYSTOLIC BLOOD PRESSURE: 102 MMHG

## 2025-01-30 DIAGNOSIS — Z86.19 H/O COLD SORES: ICD-10-CM

## 2025-01-30 DIAGNOSIS — Z00.00 ROUTINE GENERAL MEDICAL EXAMINATION AT A HEALTH CARE FACILITY: Primary | ICD-10-CM

## 2025-01-30 DIAGNOSIS — Z12.31 ENCOUNTER FOR SCREENING MAMMOGRAM FOR BREAST CANCER: ICD-10-CM

## 2025-01-30 DIAGNOSIS — R53.83 OTHER FATIGUE: ICD-10-CM

## 2025-01-30 DIAGNOSIS — F33.1 MODERATE RECURRENT MAJOR DEPRESSION (H): ICD-10-CM

## 2025-01-30 DIAGNOSIS — M25.511 RIGHT ANTERIOR SHOULDER PAIN: ICD-10-CM

## 2025-01-30 DIAGNOSIS — Z13.6 CARDIOVASCULAR SCREENING; LDL GOAL LESS THAN 130: ICD-10-CM

## 2025-01-30 LAB
CHOLEST SERPL-MCNC: 162 MG/DL
ERYTHROCYTE [DISTWIDTH] IN BLOOD BY AUTOMATED COUNT: 12.3 % (ref 10–15)
FASTING STATUS PATIENT QL REPORTED: NO
FERRITIN SERPL-MCNC: 98 NG/ML (ref 6–175)
HCT VFR BLD AUTO: 40.8 % (ref 35–47)
HDLC SERPL-MCNC: 67 MG/DL
HGB BLD-MCNC: 13.4 G/DL (ref 11.7–15.7)
LDLC SERPL CALC-MCNC: 83 MG/DL
MCH RBC QN AUTO: 30 PG (ref 26.5–33)
MCHC RBC AUTO-ENTMCNC: 32.8 G/DL (ref 31.5–36.5)
MCV RBC AUTO: 91 FL (ref 78–100)
NONHDLC SERPL-MCNC: 95 MG/DL
PLATELET # BLD AUTO: 230 10E3/UL (ref 150–450)
RBC # BLD AUTO: 4.47 10E6/UL (ref 3.8–5.2)
TRIGL SERPL-MCNC: 62 MG/DL
TSH SERPL DL<=0.005 MIU/L-ACNC: 0.71 UIU/ML (ref 0.3–4.2)
VIT B12 SERPL-MCNC: 944 PG/ML (ref 232–1245)
WBC # BLD AUTO: 4.2 10E3/UL (ref 4–11)

## 2025-01-30 RX ORDER — ACYCLOVIR 200 MG/1
200 CAPSULE ORAL EVERY 12 HOURS
Qty: 180 CAPSULE | Refills: 3 | Status: SHIPPED | OUTPATIENT
Start: 2025-01-30

## 2025-01-30 RX ORDER — BUPROPION HYDROCHLORIDE 150 MG/1
150 TABLET ORAL EVERY MORNING
Qty: 90 TABLET | Refills: 3 | Status: SHIPPED | OUTPATIENT
Start: 2025-01-30

## 2025-01-30 ASSESSMENT — ANXIETY QUESTIONNAIRES
6. BECOMING EASILY ANNOYED OR IRRITABLE: SEVERAL DAYS
7. FEELING AFRAID AS IF SOMETHING AWFUL MIGHT HAPPEN: NOT AT ALL
IF YOU CHECKED OFF ANY PROBLEMS ON THIS QUESTIONNAIRE, HOW DIFFICULT HAVE THESE PROBLEMS MADE IT FOR YOU TO DO YOUR WORK, TAKE CARE OF THINGS AT HOME, OR GET ALONG WITH OTHER PEOPLE: NOT DIFFICULT AT ALL
3. WORRYING TOO MUCH ABOUT DIFFERENT THINGS: SEVERAL DAYS
2. NOT BEING ABLE TO STOP OR CONTROL WORRYING: NOT AT ALL
GAD7 TOTAL SCORE: 5
5. BEING SO RESTLESS THAT IT IS HARD TO SIT STILL: SEVERAL DAYS
1. FEELING NERVOUS, ANXIOUS, OR ON EDGE: SEVERAL DAYS
GAD7 TOTAL SCORE: 5

## 2025-01-30 ASSESSMENT — PATIENT HEALTH QUESTIONNAIRE - PHQ9
5. POOR APPETITE OR OVEREATING: SEVERAL DAYS
SUM OF ALL RESPONSES TO PHQ QUESTIONS 1-9: 6

## 2025-01-30 ASSESSMENT — PAIN SCALES - GENERAL: PAINLEVEL_OUTOF10: NO PAIN (0)

## 2025-01-30 NOTE — PROGRESS NOTES
Preventive Care Visit  [facility]  Alexandra Hart DNP, Family Medicine  Jan 30, 2025      Assessment & Plan     Routine general medical examination at a health care facility       Moderate recurrent major depression (H)  Stable - would like to wean Wellbutrin 150 mg daily.    - buPROPion (WELLBUTRIN XL) 150 MG 24 hr tablet  Dispense: 90 tablet; Refill: 3    Right anterior shoulder pain  Likely bursitis - given AVS    H/O cold sores     - acyclovir (ZOVIRAX) 200 MG capsule  Dispense: 180 capsule; Refill: 3    Other fatigue  Labs today. Could be due to non restorative sleep or anxiety/depression.    - CBC with platelets  - Ferritin  - Vitamin B12  - TSH with free T4 reflex    CARDIOVASCULAR SCREENING; LDL GOAL LESS THAN 130     - Lipid panel reflex to direct LDL Non-fasting    Encounter for screening mammogram for breast cancer     - MA Screen Bilateral w/Caesar      Patient has been advised of split billing requirements and indicates understanding: Yes        Counseling  Appropriate preventive services were addressed with this patient via screening, questionnaire, or discussion as appropriate for fall prevention, nutrition, physical activity, Tobacco-use cessation, social engagement, weight loss and cognition.  Checklist reviewing preventive services available has been given to the patient.  Reviewed patient's diet, addressing concerns and/or questions.   She is at risk for lack of exercise and has been provided with information to increase physical activity for the benefit of her well-being.   The patient was instructed to see the dentist every 6 months.   The patient's PHQ-9 score is consistent with mild depression. She was provided with information regarding depression.       See Patient Instructions    Subjective   Germania is a 44 year old, presenting for the following:  Physical, Anxiety, and Depression        1/30/2025    10:59 AM   Additional Questions   Roomed by landy    Accompanied by self         1/30/2025    10:59 AM   Patient Reported Additional Medications   Patient reports taking the following new medications none          HPI     Depression and Anxiety   How are you doing with your depression since your last visit? No change  How are you doing with your anxiety since your last visit?  No change  Are you having other symptoms that might be associated with depression or anxiety? No  Have you had a significant life event? No   Do you have any concerns with your use of alcohol or other drugs? No  Reports wanting to cut back on medication - dulling her emotions.      Social History     Tobacco Use    Smoking status: Never    Smokeless tobacco: Never   Vaping Use    Vaping status: Never Used   Substance Use Topics    Alcohol use: Yes     Comment: rare    Drug use: No         3/22/2024    12:53 PM 12/20/2024     7:56 AM 1/30/2025    11:05 AM   PHQ   PHQ-9 Total Score 5 3 6   Q9: Thoughts of better off dead/self-harm past 2 weeks Not at all  Not at all Not at all       Proxy-reported         5/23/2023     9:51 AM 3/22/2024    12:53 PM 1/30/2025    11:05 AM   NOAM-7 SCORE   Total Score 10 (moderate anxiety)     Total Score 10 6 5         1/30/2025    11:05 AM   Last PHQ-9   1.  Little interest or pleasure in doing things 1   2.  Feeling down, depressed, or hopeless 1   3.  Trouble falling or staying asleep, or sleeping too much 1   4.  Feeling tired or having little energy 1   5.  Poor appetite or overeating 0   6.  Feeling bad about yourself 1   7.  Trouble concentrating 1   8.  Moving slowly or restless 0   Q9: Thoughts of better off dead/self-harm past 2 weeks 0   PHQ-9 Total Score 6   Difficulty at work, home, or with people Not difficult at all         1/30/2025    11:05 AM   NOAM-7    1. Feeling nervous, anxious, or on edge 1   2. Not being able to stop or control worrying 0   3. Worrying too much about different things 1   4. Trouble relaxing 1   5. Being so restless that it is  hard to sit still 1   6. Becoming easily annoyed or irritable 1   7. Feeling afraid, as if something awful might happen 0   NOAM-7 Total Score 5   If you checked any problems, how difficult have they made it for you to do your work, take care of things at home, or get along with other people? Not difficult at all       Suicide Assessment Five-step Evaluation and Treatment (SAFE-T)     Health Care Directive  Patient does not have a Health Care Directive: Discussed advance care planning with patient; information given to patient to review.      1/27/2025   General Health   How would you rate your overall physical health? Good   Feel stress (tense, anxious, or unable to sleep) Only a little   (!) STRESS CONCERN      1/27/2025   Nutrition   Three or more servings of calcium each day? Yes   Diet: Regular (no restrictions)   How many servings of fruit and vegetables per day? (!) 2-3   How many sweetened beverages each day? 0-1         1/27/2025   Exercise   Days per week of moderate/strenous exercise 3 days   Average minutes spent exercising at this level 30 min         1/27/2025   Social Factors   Frequency of gathering with friends or relatives Once a week   Worry food won't last until get money to buy more No   Food not last or not have enough money for food? No   Do you have housing? (Housing is defined as stable permanent housing and does not include staying ouside in a car, in a tent, in an abandoned building, in an overnight shelter, or couch-surfing.) Yes   Are you worried about losing your housing? No   Lack of transportation? No   Unable to get utilities (heat,electricity)? No         1/27/2025   Dental   Dentist two times every year? (!) NO            Today's PHQ-9 Score:       1/30/2025    11:05 AM   PHQ-9 SCORE   PHQ-9 Total Score 6           1/27/2025   Substance Use   Alcohol more than 3/day or more than 7/wk No   Do you use any other substances recreationally? No     Social History     Tobacco Use     Smoking status: Never    Smokeless tobacco: Never   Vaping Use    Vaping status: Never Used   Substance Use Topics    Alcohol use: Yes     Comment: rare    Drug use: No           6/20/2024   LAST FHS-7 RESULTS   1st degree relative breast or ovarian cancer No   Any relative bilateral breast cancer No   Any male have breast cancer No   Any ONE woman have BOTH breast AND ovarian cancer Yes   Any woman with breast cancer before 50yrs No   2 or more relatives with breast AND/OR ovarian cancer No   2 or more relatives with breast AND/OR bowel cancer No        Mammogram Screening - Mammogram every 1-2 years updated in Health Maintenance based on mutual decision making        1/27/2025   STI Screening   New sexual partner(s) since last STI/HIV test? No     History of abnormal Pap smear: No - age 30- 64 PAP with HPV every 5 years recommended        Latest Ref Rng & Units 11/17/2022     9:42 AM 3/29/2019    11:58 AM 4/8/2016    11:45 AM   PAP / HPV   PAP  Negative for Intraepithelial Lesion or Malignancy (NILM)      PAP (Historical)   NIL     HPV 16 DNA Negative Negative  Negative  Negative    HPV 18 DNA Negative Negative  Negative  Negative    Other HR HPV Negative Negative  Negative  Negative      ASCVD Risk   The ASCVD Risk score (Valerie JUÁREZ, et al., 2019) failed to calculate for the following reasons:    Cannot find a previous HDL lab    Cannot find a previous total cholesterol lab       Reviewed and updated as needed this visit by Provider                    Past Medical History:   Diagnosis Date    ASCUS favor benign 12/2011    neg HPV, repeat Pap + HPV cotesting in 3 years    Chickenpox     Depression     Eating disorder     bulemia- age 18 to 32    Motion sickness     PONV (postoperative nausea and vomiting)     Spider veins 5-6 years ago    Uterovaginal prolapse 10/27/2017     Past Surgical History:   Procedure Laterality Date    ARTHROSCOPIC RECONSTRUCTION ANTERIOR CRUCIATE LIGAMENT  x3    twice on L knee  and once on Rt knee    CYSTOSCOPY N/A 2023    Procedure: Cystoscopy;  Surgeon: Amy Eubanks MD;  Location: [facility] OR    DAVINCI HYSTERECTOMY SUPRACERVICAL, SACROCOLPOPEXY, COMBINED N/A 2023    Procedure: HYSTERECTOMY, SUPRACERVICAL, ROBOT-ASSISTED, LAPAROSCOPIC, Bilateral salpingectomy, SACROCOLPOPEXY, ROBOT-ASSISTED, WITH CYSTOSCOPY;  Surgeon: Amy Eubanks MD;  Location: [facility] OR    ESOPHAGOSCOPY, GASTROSCOPY, DUODENOSCOPY (EGD), COMBINED N/A 10/10/2019    Procedure: ESOPHAGOGASTRODUODENOSCOPY, WITH BIOPSY;  Surgeon: Shirlene Wilson MD;  Location: [facility] OR    MAMMOPLASTY AUGMENTATION Bilateral 2015    wisdom teeth      ZZC BREAST AUGMENTATION       OB History    Para Term  AB Living   3 3 3 0 0 3   SAB IAB Ectopic Multiple Live Births   0 0 0 0 3      # Outcome Date GA Lbr Ryan/2nd Weight Sex Type Anes PTL Lv   3 Term 11/10/16 40w0d 01:31 / 00:08 3.204 kg (7 lb 1 oz) M Vag-Spont INT N LORNE      Name: TIKA,BABY1 WILMER      Apgar1: 7  Apgar5: 9   2 Term 14 39w3d  3.175 kg (7 lb) F Vag-Spont None  LORNE      Name: Star      Apgar1: 9  Apgar5: 10   1 Term 12 40w1d 15:18 / 00:12 3.263 kg (7 lb 3.1 oz) F Vag-Vacuum EPI N LORNE      Name: Maximino Lozano      Apgar1: 8  Apgar5: 9     Lab work is in process  Labs reviewed in EPIC  BP Readings from Last 3 Encounters:   25 102/62   24 117/73   23 109/73    Wt Readings from Last 3 Encounters:   25 52.5 kg (115 lb 12.8 oz)   23 56.6 kg (124 lb 12.8 oz)   23 55.8 kg (123 lb)                  Patient Active Problem List   Diagnosis    Vulvar varicose veins    H/O cold sores    Moderate recurrent major depression (H)    Rectocele    History of motion sickness    Right anterior shoulder pain     Past Surgical History:   Procedure Laterality Date    ARTHROSCOPIC RECONSTRUCTION ANTERIOR CRUCIATE LIGAMENT  x3    twice on L knee and once on Rt knee    CYSTOSCOPY N/A 2023    Procedure: Cystoscopy;   Surgeon: Amy Eubanks MD;  Location: [facility]    DAVINCI HYSTERECTOMY SUPRACERVICAL, SACROCOLPOPEXY, COMBINED N/A 11/2/2023    Procedure: HYSTERECTOMY, SUPRACERVICAL, ROBOT-ASSISTED, LAPAROSCOPIC, Bilateral salpingectomy, SACROCOLPOPEXY, ROBOT-ASSISTED, WITH CYSTOSCOPY;  Surgeon: Amy Eubanks MD;  Location: [facility]    ESOPHAGOSCOPY, GASTROSCOPY, DUODENOSCOPY (EGD), COMBINED N/A 10/10/2019    Procedure: ESOPHAGOGASTRODUODENOSCOPY, WITH BIOPSY;  Surgeon: Shirlene Wilson MD;  Location: [facility]    MAMMOPLASTY AUGMENTATION Bilateral 2015    wisdom teeth      ZZC BREAST AUGMENTATION         Social History     Tobacco Use    Smoking status: Never    Smokeless tobacco: Never   Substance Use Topics    Alcohol use: Yes     Comment: rare     Family History   Problem Relation Age of Onset    Depression Mother     Colon Polyps Mother     Melanoma Sister     Mental Illness Brother         schizoaffective disorder    Gastrointestinal Disease Brother         ulcerative colitis    Cancer Maternal Grandmother         lymphoma    Cancer Maternal Grandfather         lung ,bone    Cancer Paternal Grandmother 60        ovarian    Breast Cancer Paternal Grandmother 85    Cardiovascular Paternal Grandfather         MI         Current Outpatient Medications   Medication Sig Dispense Refill    acetaminophen (TYLENOL) 325 MG tablet Take 3 tablets (975 mg) by mouth every 6 hours as needed for mild pain      acyclovir (ZOVIRAX) 200 MG capsule Take 1 capsule (200 mg) by mouth every 12 hours. TAKE 1 CAPSULE BY MOUTH TWICE DAILY . 180 capsule 3    buPROPion (WELLBUTRIN XL) 150 MG 24 hr tablet Take 1 tablet (150 mg) by mouth every morning. 90 tablet 3    Multiple Vitamins-Minerals (MULTIVITAMIN PO) Take by mouth daily       No Known Allergies  Recent Labs   Lab Test 10/06/23  1344 05/30/22  0004 02/02/22  0927 12/19/19  1111 08/19/19  1605 04/19/18  0900   ALT  --   --   --   --  21 22   CR 0.77 0.73   < >  --  0.60 0.67   GFRESTIMATED  ">90 >90   < >  --  >90 >90   GFRESTBLACK  --   --   --   --  >90 >90   POTASSIUM 4.1 3.8   < >  --  3.7 4.1   TSH 0.97  --   --  1.10  --   --     < > = values in this interval not displayed.          Review of Systems  Constitutional, HEENT, cardiovascular, pulmonary, GI, , musculoskeletal, neuro, skin, endocrine and psych systems are negative, except as otherwise noted.  POS for right shoulder \"ache\" for the past 4 weeks to a month.  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  Denies any injury or trauma or previous of shoulder concern     Objective    Exam  /62   Pulse 86   Temp 97  F (36.1  C) (Tympanic)   Resp 16   Ht 1.638 m (5' 4.5\")   Wt 52.5 kg (115 lb 12.8 oz)   LMP 09/15/2023 (Approximate)   SpO2 99%   BMI 19.57 kg/m     Estimated body mass index is 19.57 kg/m  as calculated from the following:    Height as of this encounter: 1.638 m (5' 4.5\").    Weight as of this encounter: 52.5 kg (115 lb 12.8 oz).    Physical Exam  GENERAL: alert and no distress  EYES: Eyes grossly normal to inspection, PERRL and conjunctivae and sclerae normal  HENT: ear canals and TM's normal, nose and mouth without ulcers or lesions  NECK: no adenopathy, no asymmetry, masses, or scars  RESP: lungs clear to auscultation - no rales, rhonchi or wheezes  CV: regular rate and rhythm, normal S1 S2, no S3 or S4, no murmur, click or rub, no peripheral edema  ABDOMEN: soft, nontender, no hepatosplenomegaly, no masses and bowel sounds normal  MS: no gross musculoskeletal defects noted, no edema, tenderness at insertion of bicep tendon proximal, full ROM, negative Cooper.  SKIN: no suspicious lesions or rashes  NEURO: Normal strength and tone, mentation intact and speech normal  PSYCH: mentation appears normal, affect normal/bright        Signed Electronically by: Alexandra Hart DNP    "

## 2025-01-30 NOTE — PATIENT INSTRUCTIONS
Patient Education   Preventive Care Advice   This is general advice given by our system to help you stay healthy. However, your care team may have specific advice just for you. Please talk to your care team about your preventive care needs.  Nutrition  Eat 5 or more servings of fruits and vegetables each day.  Try wheat bread, brown rice and whole grain pasta (instead of white bread, rice, and pasta).  Get enough calcium and vitamin D. Check the label on foods and aim for 100% of the RDA (recommended daily allowance).  Lifestyle  Exercise at least 150 minutes each week  (30 minutes a day, 5 days a week).  Do muscle strengthening activities 2 days a week. These help control your weight and prevent disease.  No smoking.  Wear sunscreen to prevent skin cancer.  Have a dental exam and cleaning every 6 months.  Yearly exams  See your health care team every year to talk about:  Any changes in your health.  Any medicines your care team has prescribed.  Preventive care, family planning, and ways to prevent chronic diseases.  Shots (vaccines)   HPV shots (up to age 26), if you've never had them before.  Hepatitis B shots (up to age 59), if you've never had them before.  COVID-19 shot: Get this shot when it's due.  Flu shot: Get a flu shot every year.  Tetanus shot: Get a tetanus shot every 10 years.  Pneumococcal, hepatitis A, and RSV shots: Ask your care team if you need these based on your risk.  Shingles shot (for age 50 and up)  General health tests  Diabetes screening:  Starting at age 35, Get screened for diabetes at least every 3 years.  If you are younger than age 35, ask your care team if you should be screened for diabetes.  Cholesterol test: At age 39, start having a cholesterol test every 5 years, or more often if advised.  Bone density scan (DEXA): At age 50, ask your care team if you should have this scan for osteoporosis (brittle bones).  Hepatitis C: Get tested at least once in your life.  STIs (sexually  transmitted infections)  Before age 24: Ask your care team if you should be screened for STIs.  After age 24: Get screened for STIs if you're at risk. You are at risk for STIs (including HIV) if:  You are sexually active with more than one person.  You don't use condoms every time.  You or a partner was diagnosed with a sexually transmitted infection.  If you are at risk for HIV, ask about PrEP medicine to prevent HIV.  Get tested for HIV at least once in your life, whether you are at risk for HIV or not.  Cancer screening tests  Cervical cancer screening: If you have a cervix, begin getting regular cervical cancer screening tests starting at age 21.  Breast cancer scan (mammogram): If you've ever had breasts, begin having regular mammograms starting at age 40. This is a scan to check for breast cancer.  Colon cancer screening: It is important to start screening for colon cancer at age 45.  Have a colonoscopy test every 10 years (or more often if you're at risk) Or, ask your provider about stool tests like a FIT test every year or Cologuard test every 3 years.  To learn more about your testing options, visit:   .  For help making a decision, visit:   https://bit.ly/rz98567.  Prostate cancer screening test: If you have a prostate, ask your care team if a prostate cancer screening test (PSA) at age 55 is right for you.  Lung cancer screening: If you are a current or former smoker ages 50 to 80, ask your care team if ongoing lung cancer screenings are right for you.  For informational purposes only. Not to replace the advice of your health care provider. Copyright   2023 [facility]. All rights reserved. Clinically reviewed by the [facility]. [employer] [id number] - REV 01/24.  Learning About Depression Screening  What is depression screening?  Depression screening is a way to see if you have depression symptoms. It may be done by a doctor or counselor. It's often part of a routine  "checkup. That's because your mental health is just as important as your physical health.  Depression is a mental health condition that affects how you feel, think, and act. You may:  Have less energy.  Lose interest in your daily activities.  Feel sad and grouchy for a long time.  Depression is very common. It affects people of all ages.  Many things can lead to depression. Some people become depressed after they have a stroke or find out they have a major illness like cancer or heart disease. The death of a loved one or a breakup may lead to depression. It can run in families. Most experts believe that a combination of inherited genes and stressful life events can cause it.  What happens during screening?  You may be asked to fill out a form about your depression symptoms. You and the doctor will discuss your answers. The doctor may ask you more questions to learn more about how you think, act, and feel.  What happens after screening?  If you have symptoms of depression, your doctor will talk to you about your options.  Doctors usually treat depression with medicines or counseling. Often, combining the two works best. Many people don't get help because they think that they'll get over the depression on their own. But people with depression may not get better unless they get treatment.  The cause of depression is not well understood. There may be many factors involved. But if you have depression, it's not your fault.  A serious symptom of depression is thinking about death or suicide. If you or someone you care about talks about this or about feeling hopeless, get help right away.  It's important to know that depression can be treated. Medicine, counseling, and self-care may help.  Where can you learn more?  Go to https://www.vufind.net/patiented  Enter T185 in the search box to learn more about \"Learning About Depression Screening.\"  Current as of: July 31, 2024  Content Version: 14.3    2024 [employer].   Care instructions adapted under license by your healthcare professional. If you have questions about a medical condition or this instruction, always ask your healthcare professional. [employer], [employer] disclaims any warranty or liability for your use of this information.

## 2025-08-13 ENCOUNTER — ANCILLARY PROCEDURE (OUTPATIENT)
Dept: MAMMOGRAPHY | Facility: CLINIC | Age: 45
End: 2025-08-13
Attending: NURSE PRACTITIONER
Payer: COMMERCIAL

## 2025-08-13 DIAGNOSIS — Z12.31 ENCOUNTER FOR SCREENING MAMMOGRAM FOR BREAST CANCER: ICD-10-CM

## 2025-08-13 PROCEDURE — 77067 SCR MAMMO BI INCL CAD: CPT | Mod: GC | Performed by: STUDENT IN AN ORGANIZED HEALTH CARE EDUCATION/TRAINING PROGRAM

## 2025-08-13 PROCEDURE — 77063 BREAST TOMOSYNTHESIS BI: CPT | Mod: GC | Performed by: STUDENT IN AN ORGANIZED HEALTH CARE EDUCATION/TRAINING PROGRAM

## (undated) DEVICE — NDL 22GA 1.5"

## (undated) DEVICE — SU VICRYL 0 UR-6 27" J603H

## (undated) DEVICE — DAVINCI XI SEAL UNIVERSAL 5-8MM 470361

## (undated) DEVICE — SU PDS II 0 CT-2 27" Z334H

## (undated) DEVICE — DAVINCI XI OBTURATOR BLADELESS 8MM 470359

## (undated) DEVICE — KIT ENDO TURNOVER/PROCEDURE CARRY-ON 101822

## (undated) DEVICE — SU PROLENE 2-0 CT-2 30" 8411H

## (undated) DEVICE — DAVINCI XI DRAPE COLUMN 470341

## (undated) DEVICE — SUCTION MANIFOLD NEPTUNE 2 SYS 1 PORT 702-025-000

## (undated) DEVICE — SU MONOCRYL 4-0 PS-2 18" UND Y496G

## (undated) DEVICE — LINEN TOWEL PACK X5 5464

## (undated) DEVICE — TUBING CONMED AIRSEAL SMOKE EVAC INSUFFLATION ASM-EVAC

## (undated) DEVICE — ENDO FORCEP ENDOJAW BIOPSY 2.8MMX230CM FB-220U

## (undated) DEVICE — CATH TRAY FOLEY SURESTEP 16FR W/URINE MTR STATLK LF A303416A

## (undated) DEVICE — DAVINCI XI MONOPOLAR SCISSORS HOT SHEARS 8MM 470179

## (undated) DEVICE — SYR 30ML SLIP TIP W/O NDL 302833

## (undated) DEVICE — ENDO POUCH UNIV RETRIEVAL SYSTEM INZII 10MM CD001

## (undated) DEVICE — GLOVE BIOGEL PI MICRO SZ 5.5 48555

## (undated) DEVICE — BLADE KNIFE SURG 11 371111

## (undated) DEVICE — DAVINCI XI GRASPER PROGRASP 8MM EXT 471093

## (undated) DEVICE — SU DERMABOND MINI DHVM12

## (undated) DEVICE — GLOVE EXAM NITRILE LG PF LATEX FREE 5064

## (undated) DEVICE — BLADE KNIFE SURG 15 371115

## (undated) DEVICE — DAVINCI XI DRAPE ARM 470015

## (undated) DEVICE — GLOVE BIOGEL PI MICRO INDICATOR UNDERGLOVE SZ 6.0 48960

## (undated) DEVICE — KIT PATIENT POSITIONING PIGAZZI LATEX FREE 40580

## (undated) DEVICE — SYR 10ML FINGER CONTROL W/O NDL 309695

## (undated) DEVICE — BLADE CLIPPER SGL USE 9680

## (undated) DEVICE — NDL INSUFFLATION 13GA 120MM C2201

## (undated) DEVICE — SUCTION CATH AIRLIFE TRI-FLO W/CONTROL PORT 14FR  T60C

## (undated) DEVICE — SOL WATER IRRIG 1000ML BOTTLE 2F7114

## (undated) DEVICE — ENDO POUCH UNIVERSAL RETRIEVAL SYSTEM INZII 5MM CD003

## (undated) DEVICE — DRSG TELFA 3X8" 1238

## (undated) DEVICE — SOL NACL 0.9% INJ 1000ML BAG 2B1324X

## (undated) DEVICE — SU VICRYL 3-0 SH 27" UND J416H

## (undated) DEVICE — SPECIMEN CONTAINER 60MLW/10% FORMALIN 59601

## (undated) DEVICE — GOWN IMPERVIOUS 2XL BLUE

## (undated) DEVICE — DAVINCI HOT SHEARS TIP COVER  400180

## (undated) DEVICE — TUBING SUCTION 12"X1/4" N612

## (undated) DEVICE — PACK DAVINCI UROLOGY SBA15UDFSG

## (undated) DEVICE — SU MONOCRYL 4-0 PS-2 27" UND Y426H

## (undated) DEVICE — ENDO BITE BLOCK ADULT OMNI-BLOC

## (undated) DEVICE — DAVINCI XI FCP BIPOLAR FENESTRATED 470205

## (undated) DEVICE — SOL WATER IRRIG 3000ML BAG 2B7117

## (undated) RX ORDER — ACETAMINOPHEN 325 MG/1
TABLET ORAL
Status: DISPENSED
Start: 2023-11-02

## (undated) RX ORDER — DEXAMETHASONE SODIUM PHOSPHATE 4 MG/ML
INJECTION, SOLUTION INTRA-ARTICULAR; INTRALESIONAL; INTRAMUSCULAR; INTRAVENOUS; SOFT TISSUE
Status: DISPENSED
Start: 2023-11-02

## (undated) RX ORDER — FENTANYL CITRATE 50 UG/ML
INJECTION, SOLUTION INTRAMUSCULAR; INTRAVENOUS
Status: DISPENSED
Start: 2023-11-02

## (undated) RX ORDER — FENTANYL CITRATE 50 UG/ML
INJECTION, SOLUTION INTRAMUSCULAR; INTRAVENOUS
Status: DISPENSED
Start: 2019-10-10

## (undated) RX ORDER — ACETAMINOPHEN 500 MG
TABLET ORAL
Status: DISPENSED
Start: 2023-11-02

## (undated) RX ORDER — EPINEPHRINE 1 MG/ML
INJECTION, SOLUTION INTRAMUSCULAR; SUBCUTANEOUS
Status: DISPENSED
Start: 2023-11-02

## (undated) RX ORDER — BUPIVACAINE HYDROCHLORIDE 2.5 MG/ML
INJECTION, SOLUTION EPIDURAL; INFILTRATION; INTRACAUDAL
Status: DISPENSED
Start: 2023-11-02

## (undated) RX ORDER — HEPARIN SODIUM 5000 [USP'U]/.5ML
INJECTION, SOLUTION INTRAVENOUS; SUBCUTANEOUS
Status: DISPENSED
Start: 2023-11-02

## (undated) RX ORDER — SCOLOPAMINE TRANSDERMAL SYSTEM 1 MG/1
PATCH, EXTENDED RELEASE TRANSDERMAL
Status: DISPENSED
Start: 2023-11-02

## (undated) RX ORDER — ONDANSETRON 2 MG/ML
INJECTION INTRAMUSCULAR; INTRAVENOUS
Status: DISPENSED
Start: 2023-11-02

## (undated) RX ORDER — HYDROMORPHONE HYDROCHLORIDE 1 MG/ML
INJECTION, SOLUTION INTRAMUSCULAR; INTRAVENOUS; SUBCUTANEOUS
Status: DISPENSED
Start: 2023-11-02

## (undated) RX ORDER — CEFAZOLIN SODIUM 1 G/3ML
INJECTION, POWDER, FOR SOLUTION INTRAMUSCULAR; INTRAVENOUS
Status: DISPENSED
Start: 2023-11-02

## (undated) RX ORDER — FENTANYL CITRATE 0.05 MG/ML
INJECTION, SOLUTION INTRAMUSCULAR; INTRAVENOUS
Status: DISPENSED
Start: 2023-11-02

## (undated) RX ORDER — PHENAZOPYRIDINE HYDROCHLORIDE 200 MG/1
TABLET, FILM COATED ORAL
Status: DISPENSED
Start: 2023-11-02

## (undated) RX ORDER — KETOROLAC TROMETHAMINE 30 MG/ML
INJECTION, SOLUTION INTRAMUSCULAR; INTRAVENOUS
Status: DISPENSED
Start: 2023-11-02

## (undated) RX ORDER — PROPOFOL 10 MG/ML
INJECTION, EMULSION INTRAVENOUS
Status: DISPENSED
Start: 2023-11-02